# Patient Record
Sex: MALE | Race: WHITE | NOT HISPANIC OR LATINO | Employment: FULL TIME | URBAN - METROPOLITAN AREA
[De-identification: names, ages, dates, MRNs, and addresses within clinical notes are randomized per-mention and may not be internally consistent; named-entity substitution may affect disease eponyms.]

---

## 2019-07-15 RX ORDER — ATORVASTATIN CALCIUM 20 MG/1
TABLET, FILM COATED ORAL EVERY 24 HOURS
COMMUNITY
Start: 2018-02-15 | End: 2019-07-15 | Stop reason: SDUPTHER

## 2019-07-15 RX ORDER — ATORVASTATIN CALCIUM 20 MG/1
20 TABLET, FILM COATED ORAL DAILY
Qty: 90 TABLET | Refills: 1 | Status: SHIPPED | OUTPATIENT
Start: 2019-07-15 | End: 2020-03-11

## 2019-07-26 PROBLEM — I21.9 MYOCARDIAL INFARCTION (HCC): Status: ACTIVE | Noted: 2017-09-28

## 2019-07-26 PROBLEM — R00.2 PALPITATIONS: Status: ACTIVE | Noted: 2017-09-27

## 2019-07-26 PROBLEM — I25.10 CORONARY HEART DISEASE: Status: ACTIVE | Noted: 2017-09-28

## 2019-07-26 PROBLEM — E78.5 HYPERLIPIDEMIA: Status: ACTIVE | Noted: 2017-09-28

## 2019-07-26 PROBLEM — I10 HYPERTENSION: Status: ACTIVE | Noted: 2017-09-28

## 2019-07-26 RX ORDER — CLOPIDOGREL BISULFATE 75 MG/1
TABLET ORAL EVERY 24 HOURS
COMMUNITY
Start: 2018-02-15 | End: 2019-09-13 | Stop reason: SDUPTHER

## 2019-07-26 RX ORDER — AMLODIPINE BESYLATE 5 MG/1
TABLET ORAL EVERY 24 HOURS
COMMUNITY
Start: 2017-09-28 | End: 2022-07-12

## 2019-07-26 RX ORDER — LISINOPRIL 20 MG/1
TABLET ORAL EVERY 24 HOURS
COMMUNITY
Start: 2017-09-28 | End: 2021-04-27 | Stop reason: ALTCHOICE

## 2019-07-26 RX ORDER — ASPIRIN 325 MG
TABLET ORAL EVERY 24 HOURS
COMMUNITY
Start: 2017-09-28 | End: 2022-07-12

## 2019-07-30 ENCOUNTER — OFFICE VISIT (OUTPATIENT)
Dept: CARDIOLOGY | Facility: CLINIC | Age: 34
End: 2019-07-30

## 2019-07-30 VITALS
BODY MASS INDEX: 39.96 KG/M2 | HEIGHT: 72 IN | WEIGHT: 295 LBS | SYSTOLIC BLOOD PRESSURE: 119 MMHG | HEART RATE: 56 BPM | DIASTOLIC BLOOD PRESSURE: 81 MMHG

## 2019-07-30 DIAGNOSIS — I25.118 CORONARY ARTERY DISEASE OF NATIVE ARTERY OF NATIVE HEART WITH STABLE ANGINA PECTORIS (HCC): Primary | ICD-10-CM

## 2019-07-30 DIAGNOSIS — I10 ESSENTIAL HYPERTENSION: ICD-10-CM

## 2019-07-30 DIAGNOSIS — E78.00 PURE HYPERCHOLESTEROLEMIA: ICD-10-CM

## 2019-07-30 PROCEDURE — 99213 OFFICE O/P EST LOW 20 MIN: CPT | Performed by: INTERNAL MEDICINE

## 2019-07-30 RX ORDER — TRAMADOL HYDROCHLORIDE 50 MG/1
2 TABLET ORAL 2 TIMES DAILY
Refills: 0 | COMMUNITY
Start: 2019-07-13

## 2019-07-30 RX ORDER — LORAZEPAM 0.5 MG/1
0.5 TABLET ORAL 2 TIMES DAILY
Refills: 0 | COMMUNITY
Start: 2019-07-26

## 2019-07-30 RX ORDER — ATENOLOL 50 MG/1
50 TABLET ORAL DAILY
Refills: 0 | COMMUNITY
Start: 2019-06-16 | End: 2022-12-06 | Stop reason: ALTCHOICE

## 2019-07-30 RX ORDER — FAMOTIDINE 40 MG/1
40 TABLET, FILM COATED ORAL DAILY
Refills: 0 | COMMUNITY
Start: 2019-07-16 | End: 2021-04-27

## 2019-07-30 RX ORDER — GABAPENTIN 300 MG/1
300 CAPSULE ORAL 3 TIMES DAILY
Refills: 0 | COMMUNITY
Start: 2019-05-21

## 2019-07-30 RX ORDER — LOSARTAN POTASSIUM 100 MG/1
100 TABLET ORAL DAILY
Refills: 0 | COMMUNITY
Start: 2019-07-24

## 2019-07-30 NOTE — PROGRESS NOTES
"    Subjective:     Encounter Date:07/30/2019      Patient ID: Clark Randolph is a 33 y.o. male.    Chief Complaint:  History of Present Illness 33-year-old white male with history of coronary status post MI and stent placement history of hypertension hyperlipidemia and sleep apnea presents to my office for a follow-up.  Symptoms of chest pain or shortness of breath at rest on exertion.  No complaints of any PND or orthopnea.  No palpitations dizziness syncope or swelling of the feet.  He still continues to smoke.    The following portions of the patient's history were reviewed and updated as appropriate: allergies, current medications, past family history, past medical history, past social history, past surgical history and problem list.  History reviewed. No pertinent past medical history.  History reviewed. No pertinent surgical history.  /81 (BP Location: Left arm, Patient Position: Sitting, Cuff Size: Large Adult)   Pulse 56   Ht 182.9 cm (72\")   Wt 134 kg (295 lb)   BMI 40.01 kg/m²   History reviewed. No pertinent family history.    Current Outpatient Medications:   •  aspirin 325 MG tablet, Daily., Disp: , Rfl:   •  atenolol (TENORMIN) 50 MG tablet, Take 50 mg by mouth Daily., Disp: , Rfl: 0  •  atorvastatin (LIPITOR) 20 MG tablet, Take 1 tablet by mouth Daily., Disp: 90 tablet, Rfl: 1  •  clopidogrel (PLAVIX) 75 MG tablet, Daily., Disp: , Rfl:   •  famotidine (PEPCID) 40 MG tablet, Take 40 mg by mouth Daily., Disp: , Rfl: 0  •  gabapentin (NEURONTIN) 300 MG capsule, Take 300 mg by mouth 3 (Three) Times a Day., Disp: , Rfl: 0  •  LORazepam (ATIVAN) 0.5 MG tablet, Take 0.5 mg by mouth 2 (Two) Times a Day., Disp: , Rfl: 0  •  losartan (COZAAR) 100 MG tablet, Take 100 mg by mouth Daily., Disp: , Rfl: 0  •  traMADol (ULTRAM) 50 MG tablet, Take 2 tablets by mouth 2 (Two) Times a Day., Disp: , Rfl: 0  •  amLODIPine (NORVASC) 5 MG tablet, Daily., Disp: , Rfl:   •  lisinopril (PRINIVIL,ZESTRIL) 20 MG " tablet, Daily., Disp: , Rfl:   No Known Allergies  Social History     Socioeconomic History   • Marital status:      Spouse name: Not on file   • Number of children: Not on file   • Years of education: Not on file   • Highest education level: Not on file   Tobacco Use   • Smoking status: Current Every Day Smoker     Packs/day: 1.00     Types: Cigarettes   • Smokeless tobacco: Never Used   Substance and Sexual Activity   • Alcohol use: No     Frequency: Never   • Drug use: No   • Sexual activity: Defer     Review of Systems   Constitution: Negative for fever and malaise/fatigue.   HENT: Negative for congestion and hearing loss.    Eyes: Negative for double vision and visual disturbance.   Cardiovascular: Negative for chest pain, claudication, dyspnea on exertion, leg swelling and syncope.   Respiratory: Negative for cough and shortness of breath.    Endocrine: Negative for cold intolerance.   Skin: Negative for color change and rash.   Musculoskeletal: Negative for arthritis and joint pain.   Gastrointestinal: Negative for abdominal pain and heartburn.   Genitourinary: Negative for hematuria.   Neurological: Negative for excessive daytime sleepiness and dizziness.   Psychiatric/Behavioral: Negative for depression. The patient is not nervous/anxious.    All other systems reviewed and are negative.             Objective:     Physical Exam   Constitutional: He appears well-developed and well-nourished.   HENT:   Head: Normocephalic and atraumatic.   Eyes: Conjunctivae are normal. No scleral icterus.   Neck: Normal range of motion. Neck supple. No JVD present. Carotid bruit is not present.   Cardiovascular: Normal rate, regular rhythm, S1 normal, S2 normal, normal heart sounds and intact distal pulses. PMI is not displaced.   Pulmonary/Chest: Effort normal and breath sounds normal. He has no wheezes. He has no rales.   Abdominal: Soft. Bowel sounds are normal.   Neurological: He is alert. He has normal strength.    Skin: Skin is warm and dry. No rash noted.     Procedures    Lab Review:       Assessment:          Diagnosis Plan   1. Coronary artery disease of native artery of native heart with stable angina pectoris (CMS/HCC)     2. Essential hypertension     3. Pure hypercholesterolemia            Plan:       Patient had history of MI and stent placement to the LAD in the past  Patient is currently stable on aspirin Plavix beta-blockers and losartan.  Patient's lipid levels are followed by the primary care doctor.  Blood pressure and heart rate are stable per  Patient still continues to smoke and have advised him to stop smoking.  Continue current medical therapy and follow-up in 6 months.

## 2019-09-13 RX ORDER — CLOPIDOGREL BISULFATE 75 MG/1
TABLET ORAL
Qty: 30 TABLET | Refills: 5 | Status: SHIPPED | OUTPATIENT
Start: 2019-09-13 | End: 2020-05-08

## 2020-03-11 RX ORDER — ATORVASTATIN CALCIUM 20 MG/1
TABLET, FILM COATED ORAL
Qty: 90 TABLET | Refills: 0 | Status: SHIPPED | OUTPATIENT
Start: 2020-03-11 | End: 2020-06-09

## 2020-05-08 RX ORDER — CLOPIDOGREL BISULFATE 75 MG/1
TABLET ORAL
Qty: 30 TABLET | Refills: 0 | Status: SHIPPED | OUTPATIENT
Start: 2020-05-08 | End: 2020-06-09

## 2020-06-09 RX ORDER — CLOPIDOGREL BISULFATE 75 MG/1
TABLET ORAL
Qty: 30 TABLET | Refills: 0 | Status: SHIPPED | OUTPATIENT
Start: 2020-06-09 | End: 2020-07-09

## 2020-06-09 RX ORDER — ATORVASTATIN CALCIUM 20 MG/1
TABLET, FILM COATED ORAL
Qty: 30 TABLET | Refills: 0 | Status: SHIPPED | OUTPATIENT
Start: 2020-06-09 | End: 2020-07-09

## 2020-07-09 RX ORDER — CLOPIDOGREL BISULFATE 75 MG/1
TABLET ORAL
Qty: 30 TABLET | Refills: 0 | Status: SHIPPED | OUTPATIENT
Start: 2020-07-09 | End: 2020-08-10

## 2020-07-09 RX ORDER — ATORVASTATIN CALCIUM 20 MG/1
TABLET, FILM COATED ORAL
Qty: 30 TABLET | Refills: 0 | Status: SHIPPED | OUTPATIENT
Start: 2020-07-09 | End: 2020-08-10

## 2020-08-10 RX ORDER — ATORVASTATIN CALCIUM 20 MG/1
TABLET, FILM COATED ORAL
Qty: 30 TABLET | Refills: 0 | Status: SHIPPED | OUTPATIENT
Start: 2020-08-10 | End: 2020-08-10

## 2020-08-10 RX ORDER — CLOPIDOGREL BISULFATE 75 MG/1
TABLET ORAL
Qty: 30 TABLET | Refills: 0 | Status: SHIPPED | OUTPATIENT
Start: 2020-08-10 | End: 2020-08-10

## 2020-08-10 RX ORDER — ATORVASTATIN CALCIUM 20 MG/1
TABLET, FILM COATED ORAL
Qty: 30 TABLET | Refills: 0 | Status: SHIPPED | OUTPATIENT
Start: 2020-08-10 | End: 2020-10-12

## 2020-08-10 RX ORDER — CLOPIDOGREL BISULFATE 75 MG/1
TABLET ORAL
Qty: 30 TABLET | Refills: 0 | Status: SHIPPED | OUTPATIENT
Start: 2020-08-10 | End: 2020-10-12

## 2020-10-11 ENCOUNTER — TELEPHONE (OUTPATIENT)
Dept: CARDIOLOGY | Facility: CLINIC | Age: 35
End: 2020-10-11

## 2020-10-11 DIAGNOSIS — E78.5 HYPERLIPIDEMIA LDL GOAL <100: Primary | ICD-10-CM

## 2020-10-12 RX ORDER — CLOPIDOGREL BISULFATE 75 MG/1
TABLET ORAL
Qty: 30 TABLET | Refills: 0 | Status: SHIPPED | OUTPATIENT
Start: 2020-10-12 | End: 2020-11-16

## 2020-10-12 RX ORDER — ATORVASTATIN CALCIUM 20 MG/1
TABLET, FILM COATED ORAL
Qty: 30 TABLET | Refills: 0 | Status: SHIPPED | OUTPATIENT
Start: 2020-10-12 | End: 2020-11-16

## 2020-11-16 RX ORDER — ATORVASTATIN CALCIUM 20 MG/1
TABLET, FILM COATED ORAL
Qty: 30 TABLET | Refills: 0 | Status: SHIPPED | OUTPATIENT
Start: 2020-11-16 | End: 2020-12-15

## 2020-11-16 RX ORDER — CLOPIDOGREL BISULFATE 75 MG/1
TABLET ORAL
Qty: 30 TABLET | Refills: 0 | Status: SHIPPED | OUTPATIENT
Start: 2020-11-16 | End: 2020-12-15

## 2020-11-17 ENCOUNTER — TELEPHONE (OUTPATIENT)
Dept: CARDIOLOGY | Facility: CLINIC | Age: 35
End: 2020-11-17

## 2020-12-15 RX ORDER — ATORVASTATIN CALCIUM 20 MG/1
TABLET, FILM COATED ORAL
Qty: 30 TABLET | Refills: 0 | Status: SHIPPED | OUTPATIENT
Start: 2020-12-15 | End: 2021-01-19

## 2020-12-15 RX ORDER — CLOPIDOGREL BISULFATE 75 MG/1
TABLET ORAL
Qty: 30 TABLET | Refills: 0 | Status: SHIPPED | OUTPATIENT
Start: 2020-12-15 | End: 2021-01-19

## 2021-01-19 RX ORDER — ATORVASTATIN CALCIUM 20 MG/1
TABLET, FILM COATED ORAL
Qty: 30 TABLET | Refills: 0 | Status: SHIPPED | OUTPATIENT
Start: 2021-01-19 | End: 2021-02-22

## 2021-01-19 RX ORDER — CLOPIDOGREL BISULFATE 75 MG/1
TABLET ORAL
Qty: 30 TABLET | Refills: 0 | Status: SHIPPED | OUTPATIENT
Start: 2021-01-19 | End: 2021-02-22

## 2021-02-22 RX ORDER — ATORVASTATIN CALCIUM 20 MG/1
TABLET, FILM COATED ORAL
Qty: 30 TABLET | Refills: 0 | Status: SHIPPED | OUTPATIENT
Start: 2021-02-22 | End: 2021-03-03 | Stop reason: SDUPTHER

## 2021-02-22 RX ORDER — CLOPIDOGREL BISULFATE 75 MG/1
TABLET ORAL
Qty: 30 TABLET | Refills: 0 | Status: SHIPPED | OUTPATIENT
Start: 2021-02-22 | End: 2021-03-29 | Stop reason: SDUPTHER

## 2021-03-03 RX ORDER — ATORVASTATIN CALCIUM 20 MG/1
20 TABLET, FILM COATED ORAL DAILY
Qty: 30 TABLET | Refills: 5 | Status: SHIPPED | OUTPATIENT
Start: 2021-03-03 | End: 2021-10-11

## 2021-03-29 RX ORDER — CLOPIDOGREL BISULFATE 75 MG/1
TABLET ORAL
Qty: 30 TABLET | Refills: 0 | OUTPATIENT
Start: 2021-03-29

## 2021-03-29 RX ORDER — CLOPIDOGREL BISULFATE 75 MG/1
75 TABLET ORAL DAILY
Qty: 30 TABLET | Refills: 0 | Status: SHIPPED | OUTPATIENT
Start: 2021-03-29 | End: 2021-05-03

## 2021-04-16 RX ORDER — CLOPIDOGREL BISULFATE 75 MG/1
TABLET ORAL
Qty: 30 TABLET | Refills: 0 | OUTPATIENT
Start: 2021-04-16

## 2021-04-27 ENCOUNTER — OFFICE VISIT (OUTPATIENT)
Dept: CARDIOLOGY | Facility: CLINIC | Age: 36
End: 2021-04-27

## 2021-04-27 VITALS
WEIGHT: 290 LBS | BODY MASS INDEX: 39.28 KG/M2 | DIASTOLIC BLOOD PRESSURE: 81 MMHG | HEART RATE: 82 BPM | HEIGHT: 72 IN | SYSTOLIC BLOOD PRESSURE: 119 MMHG | OXYGEN SATURATION: 98 %

## 2021-04-27 DIAGNOSIS — I25.118 CORONARY ARTERY DISEASE OF NATIVE ARTERY OF NATIVE HEART WITH STABLE ANGINA PECTORIS (HCC): ICD-10-CM

## 2021-04-27 DIAGNOSIS — E78.5 HYPERLIPIDEMIA LDL GOAL <100: Primary | ICD-10-CM

## 2021-04-27 DIAGNOSIS — I10 ESSENTIAL HYPERTENSION: ICD-10-CM

## 2021-04-27 DIAGNOSIS — G47.33 OBSTRUCTIVE SLEEP APNEA: ICD-10-CM

## 2021-04-27 PROCEDURE — 99214 OFFICE O/P EST MOD 30 MIN: CPT | Performed by: INTERNAL MEDICINE

## 2021-04-27 NOTE — PROGRESS NOTES
"    Subjective:     Encounter Date:04/27/2021      Patient ID: Clark Randolph is a 35 y.o. male.    Chief Complaint:  History of Present Illness 35-year-old white male with history of coronary disease history of hypertension hyperlipidemia and sleep apnea presents to my office for follow-up.  Patient is currently stable without any symptoms of chest pain or shortness of breath at rest on exertion.  No complaints any PND orthopnea.  No palpitation dizziness syncope or swelling of the feet.  Patient has been taking all the medicines regularly.  Patient continues to smoke.  Patient is trying to exercise regularly follows a good diet.    The following portions of the patient's history were reviewed and updated as appropriate: allergies, current medications, past family history, past medical history, past social history, past surgical history and problem list.  Past Medical History:   Diagnosis Date   • Coronary artery disease    • Hyperlipidemia    • Hypertension      Past Surgical History:   Procedure Laterality Date   • NO PAST SURGERIES       /81 (BP Location: Left arm, Patient Position: Sitting, Cuff Size: Adult)   Pulse 82   Ht 182.9 cm (72\")   Wt 132 kg (290 lb)   SpO2 98%   BMI 39.33 kg/m²   Family History   Problem Relation Age of Onset   • Heart disease Mother    • Heart disease Father    • Heart failure Father    • No Known Problems Sister    • Heart disease Brother    • Heart failure Brother    • No Known Problems Maternal Aunt    • No Known Problems Maternal Uncle    • No Known Problems Paternal Aunt    • No Known Problems Paternal Uncle    • No Known Problems Maternal Grandmother    • Heart disease Maternal Grandfather    • Heart failure Maternal Grandfather    • No Known Problems Paternal Grandmother    • No Known Problems Paternal Grandfather    • No Known Problems Other    • Anemia Neg Hx    • Arrhythmia Neg Hx    • Asthma Neg Hx    • Clotting disorder Neg Hx    • Fainting Neg Hx    • Heart " attack Neg Hx    • Hyperlipidemia Neg Hx    • Hypertension Neg Hx        Current Outpatient Medications:   •  amLODIPine (NORVASC) 5 MG tablet, Daily., Disp: , Rfl:   •  aspirin 325 MG tablet, Daily., Disp: , Rfl:   •  atenolol (TENORMIN) 50 MG tablet, Take 50 mg by mouth Daily., Disp: , Rfl: 0  •  atorvastatin (LIPITOR) 20 MG tablet, Take 1 tablet by mouth Daily., Disp: 30 tablet, Rfl: 5  •  clopidogrel (PLAVIX) 75 MG tablet, Take 1 tablet by mouth Daily., Disp: 30 tablet, Rfl: 0  •  gabapentin (NEURONTIN) 300 MG capsule, Take 300 mg by mouth 3 (Three) Times a Day., Disp: , Rfl: 0  •  LORazepam (ATIVAN) 0.5 MG tablet, Take 0.5 mg by mouth 2 (Two) Times a Day., Disp: , Rfl: 0  •  losartan (COZAAR) 100 MG tablet, Take 100 mg by mouth Daily., Disp: , Rfl: 0  •  traMADol (ULTRAM) 50 MG tablet, Take 2 tablets by mouth 2 (Two) Times a Day., Disp: , Rfl: 0  No Known Allergies  Social History     Socioeconomic History   • Marital status:      Spouse name: Not on file   • Number of children: Not on file   • Years of education: Not on file   • Highest education level: Not on file   Tobacco Use   • Smoking status: Current Every Day Smoker     Packs/day: 1.00     Types: Cigarettes   • Smokeless tobacco: Never Used   Vaping Use   • Vaping Use: Never used   Substance and Sexual Activity   • Alcohol use: No   • Drug use: No   • Sexual activity: Defer     Review of Systems   Constitutional: Negative for fever and malaise/fatigue.   Cardiovascular: Negative for chest pain, dyspnea on exertion and palpitations.   Respiratory: Negative for cough and shortness of breath.    Skin: Negative for rash.   Gastrointestinal: Negative for abdominal pain, nausea and vomiting.   Neurological: Negative for focal weakness and headaches.   All other systems reviewed and are negative.             Objective:     Constitutional:       Appearance: Well-developed.   Eyes:      General: No scleral icterus.     Conjunctiva/sclera: Conjunctivae  normal.   HENT:      Head: Normocephalic and atraumatic.   Neck:      Vascular: No carotid bruit or JVD.   Pulmonary:      Effort: Pulmonary effort is normal.      Breath sounds: Normal breath sounds. No wheezing. No rales.   Cardiovascular:      Normal rate. Regular rhythm.   Pulses:     Intact distal pulses.   Abdominal:      General: Bowel sounds are normal.      Palpations: Abdomen is soft.   Musculoskeletal:      Cervical back: Normal range of motion and neck supple. Skin:     General: Skin is warm and dry.      Findings: No rash.   Neurological:      Mental Status: Alert.       Procedures    Lab Review:         MDM  1.  Coronary disease  Patient has nonobstructive coronary disease now with normal LV systolic function is currently stable on medications  2.  Hypertension  Patient blood pressure currently stable on medications  3.  Hyperlipidemia  Patient is on Lipitor 20 mg and his lipid levels are followed by the primary care doctor  4.  Obstructive sleep apnea  Patient complains of fatigue and probably has sleep apnea needs to be tested.  He does not use a CPAP machine at this time.

## 2021-05-03 RX ORDER — CLOPIDOGREL BISULFATE 75 MG/1
TABLET ORAL
Qty: 90 TABLET | Refills: 3 | Status: SHIPPED | OUTPATIENT
Start: 2021-05-03 | End: 2022-06-13 | Stop reason: SDUPTHER

## 2021-10-11 RX ORDER — ATORVASTATIN CALCIUM 20 MG/1
TABLET, FILM COATED ORAL
Qty: 30 TABLET | Refills: 0 | Status: SHIPPED | OUTPATIENT
Start: 2021-10-11 | End: 2021-11-11

## 2021-10-11 NOTE — TELEPHONE ENCOUNTER
Rx Refill Note  Requested Prescriptions     Pending Prescriptions Disp Refills   • atorvastatin (LIPITOR) 20 MG tablet [Pharmacy Med Name: Atorvastatin Calcium 20 MG Oral Tablet] 30 tablet 0     Sig: Take 1 tablet by mouth once daily      Last office visit with prescribing clinician: 4/27/2021      Next office visit with prescribing clinician: Visit date not found            Pita Segura MA  10/11/21, 07:56 EDT

## 2021-11-11 RX ORDER — ATORVASTATIN CALCIUM 20 MG/1
TABLET, FILM COATED ORAL
Qty: 30 TABLET | Refills: 0 | Status: SHIPPED | OUTPATIENT
Start: 2021-11-11 | End: 2021-12-16

## 2021-11-11 NOTE — TELEPHONE ENCOUNTER
Rx Refill Note  Requested Prescriptions     Pending Prescriptions Disp Refills   • atorvastatin (LIPITOR) 20 MG tablet [Pharmacy Med Name: Atorvastatin Calcium 20 MG Oral Tablet] 30 tablet 0     Sig: Take 1 tablet by mouth once daily      Last office visit with prescribing clinician: 4/27/2021      Next office visit with prescribing clinician: jessee/u in [place]       {TIP  Please add Last Relevant Lab Date if appropriate:23}     Lauren Dunaway MA  11/11/21, 09:23 EST

## 2021-12-16 RX ORDER — ATORVASTATIN CALCIUM 20 MG/1
TABLET, FILM COATED ORAL
Qty: 30 TABLET | Refills: 3 | Status: SHIPPED | OUTPATIENT
Start: 2021-12-16 | End: 2022-04-18

## 2021-12-16 NOTE — TELEPHONE ENCOUNTER
Rx Refill Note  Requested Prescriptions     Pending Prescriptions Disp Refills   • atorvastatin (LIPITOR) 20 MG tablet [Pharmacy Med Name: Atorvastatin Calcium 20 MG Oral Tablet] 30 tablet 0     Sig: Take 1 tablet by mouth once daily      Last office visit with prescribing clinician: 4/27/2021      Next office visit with prescribing clinician: Visit date not found            Chelsey Rangel MA  12/16/21, 09:52 EST

## 2022-04-18 RX ORDER — ATORVASTATIN CALCIUM 20 MG/1
TABLET, FILM COATED ORAL
Qty: 30 TABLET | Refills: 0 | Status: SHIPPED | OUTPATIENT
Start: 2022-04-18 | End: 2022-05-24

## 2022-05-24 RX ORDER — ATORVASTATIN CALCIUM 20 MG/1
TABLET, FILM COATED ORAL
Qty: 30 TABLET | Refills: 0 | Status: SHIPPED | OUTPATIENT
Start: 2022-05-24 | End: 2022-06-24

## 2022-05-24 NOTE — TELEPHONE ENCOUNTER
Rx Refill Note  Requested Prescriptions     Pending Prescriptions Disp Refills   • atorvastatin (LIPITOR) 20 MG tablet [Pharmacy Med Name: Atorvastatin Calcium 20 MG Oral Tablet] 30 tablet 0     Sig: Take 1 tablet by mouth once daily      Last office visit with prescribing clinician: 4/27/2021      Next office visit with prescribing clinician: 5/24/2022            Pita Segura MA  05/24/22, 08:06 EDT

## 2022-06-07 RX ORDER — CLOPIDOGREL BISULFATE 75 MG/1
TABLET ORAL
Qty: 90 TABLET | Refills: 0 | OUTPATIENT
Start: 2022-06-07

## 2022-06-07 NOTE — TELEPHONE ENCOUNTER
Rx Refill Note  Requested Prescriptions     Pending Prescriptions Disp Refills   • clopidogrel (PLAVIX) 75 MG tablet [Pharmacy Med Name: Clopidogrel Bisulfate 75 MG Oral Tablet] 90 tablet 0     Sig: Take 1 tablet by mouth once daily      Last office visit with prescribing clinician: 4/27/2021      Next office visit with prescribing clinician: Visit date not found            Chelsey Rangel MA  06/07/22, 09:22 EDT

## 2022-06-09 RX ORDER — CLOPIDOGREL BISULFATE 75 MG/1
TABLET ORAL
Qty: 90 TABLET | Refills: 0 | OUTPATIENT
Start: 2022-06-09

## 2022-06-09 NOTE — TELEPHONE ENCOUNTER
Rx Refill Note  Requested Prescriptions     Refused Prescriptions Disp Refills   • clopidogrel (PLAVIX) 75 MG tablet [Pharmacy Med Name: Clopidogrel Bisulfate 75 MG Oral Tablet] 90 tablet 0     Sig: Take 1 tablet by mouth once daily      Last office visit with prescribing clinician: 4/27/2021      Next office visit with prescribing clinician: Visit date not found            Pita Segura MA  06/09/22, 14:59 EDT

## 2022-06-13 ENCOUNTER — TELEPHONE (OUTPATIENT)
Dept: CARDIOLOGY | Facility: CLINIC | Age: 37
End: 2022-06-13

## 2022-06-13 RX ORDER — CLOPIDOGREL BISULFATE 75 MG/1
75 TABLET ORAL DAILY
Qty: 90 TABLET | OUTPATIENT
Start: 2022-06-13

## 2022-06-13 RX ORDER — CLOPIDOGREL BISULFATE 75 MG/1
75 TABLET ORAL DAILY
Qty: 30 TABLET | Refills: 0 | Status: SHIPPED | OUTPATIENT
Start: 2022-06-13 | End: 2022-07-05

## 2022-06-13 NOTE — TELEPHONE ENCOUNTER
WIFE CALLED FOR REFILL ON PLAVIX.  SHE SAID PATIENT HAS APT IN [place] THIS MONTH WITH DR. PATRICK, BUT COULD NOT TELL ME THE DATE. CAN WE SEND IN REFILL TO [facility] IN [place].

## 2022-06-13 NOTE — TELEPHONE ENCOUNTER
Rx Refill Note  Requested Prescriptions     Pending Prescriptions Disp Refills   • clopidogrel (PLAVIX) 75 MG tablet 30 tablet 0     Sig: Take 1 tablet by mouth Daily.      Last office visit with prescribing clinician: 4/27/2021      Next office visit with prescribing clinician: Visit date not found            Pita Segura MA  06/13/22, 10:28 EDT

## 2022-06-13 NOTE — TELEPHONE ENCOUNTER
Rx Refill Note  Requested Prescriptions     Pending Prescriptions Disp Refills   • clopidogrel (PLAVIX) 75 MG tablet [Pharmacy Med Name: CLOPIDOGREL 75MG TABLETS] 90 tablet      Sig: TAKE 1 TABLET BY MOUTH DAILY      Last office visit with prescribing clinician: 4/27/2021      Next office visit with prescribing clinician: Visit date not found            Chelsey Rangel MA  06/13/22, 10:39 EDT

## 2022-06-24 RX ORDER — ATORVASTATIN CALCIUM 20 MG/1
TABLET, FILM COATED ORAL
Qty: 30 TABLET | Refills: 0 | Status: SHIPPED | OUTPATIENT
Start: 2022-06-24 | End: 2022-07-26

## 2022-06-24 NOTE — TELEPHONE ENCOUNTER
Rx Refill Note  Requested Prescriptions     Pending Prescriptions Disp Refills   • atorvastatin (LIPITOR) 20 MG tablet [Pharmacy Med Name: Atorvastatin Calcium 20 MG Oral Tablet] 30 tablet 0     Sig: Take 1 tablet by mouth once daily      Last office visit with prescribing clinician: 4/27/2021      Next office visit with prescribing clinician: Visit date not found            Chelsey Rangel MA  06/24/22, 13:52 EDT

## 2022-06-29 ENCOUNTER — TELEPHONE (OUTPATIENT)
Dept: CARDIOLOGY | Facility: CLINIC | Age: 37
End: 2022-06-29

## 2022-06-29 NOTE — TELEPHONE ENCOUNTER
Dr. Chan Garcia  Tooth extraction  Scheduled 7/1/22  Phone# [phone]  Fax# [phone]          Placed on Dr. Jose HILL desk

## 2022-07-05 RX ORDER — CLOPIDOGREL BISULFATE 75 MG/1
75 TABLET ORAL DAILY
Qty: 90 TABLET | OUTPATIENT
Start: 2022-07-05

## 2022-07-05 RX ORDER — CLOPIDOGREL BISULFATE 75 MG/1
75 TABLET ORAL DAILY
Qty: 30 TABLET | Refills: 0 | Status: SHIPPED | OUTPATIENT
Start: 2022-07-05 | End: 2022-08-11

## 2022-07-05 NOTE — TELEPHONE ENCOUNTER
Rx Refill Note  Requested Prescriptions     Pending Prescriptions Disp Refills   • clopidogrel (PLAVIX) 75 MG tablet [Pharmacy Med Name: CLOPIDOGREL 75MG TABLETS] 30 tablet 0     Sig: TAKE 1 TABLET BY MOUTH DAILY      Last office visit with prescribing clinician: 4/27/2021      Next office visit with prescribing clinician: Visit date not found            Chelsey Rangel MA  07/05/22, 08:50 EDT

## 2022-07-05 NOTE — TELEPHONE ENCOUNTER
Rx Refill Note  Requested Prescriptions     Refused Prescriptions Disp Refills   • clopidogrel (PLAVIX) 75 MG tablet [Pharmacy Med Name: CLOPIDOGREL 75MG TABLETS] 90 tablet      Sig: TAKE 1 TABLET BY MOUTH DAILY      Last office visit with prescribing clinician: 4/27/2021      Next office visit with prescribing clinician: Visit date not found            Chelsey Rangel MA  07/05/22, 09:23 EDT

## 2022-07-12 ENCOUNTER — OFFICE VISIT (OUTPATIENT)
Dept: CARDIOLOGY | Facility: CLINIC | Age: 37
End: 2022-07-12

## 2022-07-12 VITALS
WEIGHT: 288 LBS | HEART RATE: 61 BPM | BODY MASS INDEX: 39.01 KG/M2 | DIASTOLIC BLOOD PRESSURE: 80 MMHG | OXYGEN SATURATION: 97 % | HEIGHT: 72 IN | SYSTOLIC BLOOD PRESSURE: 117 MMHG

## 2022-07-12 DIAGNOSIS — I10 ESSENTIAL HYPERTENSION: ICD-10-CM

## 2022-07-12 DIAGNOSIS — I25.118 CORONARY ARTERY DISEASE OF NATIVE ARTERY OF NATIVE HEART WITH STABLE ANGINA PECTORIS: ICD-10-CM

## 2022-07-12 DIAGNOSIS — G47.33 OBSTRUCTIVE SLEEP APNEA: ICD-10-CM

## 2022-07-12 DIAGNOSIS — E78.5 HYPERLIPIDEMIA LDL GOAL <100: Primary | ICD-10-CM

## 2022-07-12 PROCEDURE — 99213 OFFICE O/P EST LOW 20 MIN: CPT | Performed by: INTERNAL MEDICINE

## 2022-07-12 RX ORDER — ASPIRIN 81 MG/1
81 TABLET ORAL DAILY
COMMUNITY
End: 2022-12-06

## 2022-07-12 NOTE — PROGRESS NOTES
"    Subjective:     Encounter Date:07/12/2022      Patient ID: Clark Randolph is a 36 y.o. male.    Chief Complaint:  History of Present Illness 36-year-old white male with history of coronary status post and placement of LAD history of hypertension hyperlipidemia presents to my office for follow-up.  Patient is currently stable without any symptoms of chest pain but has some shortness of breath with exertion.  No complains any PND orthopnea.  No palpitations dizziness syncope he has some mild swelling of the feet but is taking specifically but he still continues to smoke.    The following portions of the patient's history were reviewed and updated as appropriate: allergies, current medications, past family history, past medical history, past social history, past surgical history and problem list.  Past Medical History:   Diagnosis Date   • Coronary artery disease    • Hyperlipidemia    • Hypertension    • Myocardial infarction (HCC) 09/15/2017     Past Surgical History:   Procedure Laterality Date   • CORONARY STENT PLACEMENT  09/2017   • NO PAST SURGERIES       /80 (BP Location: Left arm, Patient Position: Sitting, Cuff Size: Adult)   Pulse 61   Ht 182.9 cm (72\")   Wt 131 kg (288 lb)   SpO2 97%   BMI 39.06 kg/m²   Family History   Problem Relation Age of Onset   • Heart disease Mother    • Heart disease Father    • Heart failure Father    • No Known Problems Sister    • Heart disease Brother    • Heart failure Brother    • Heart attack Brother    • No Known Problems Maternal Aunt    • No Known Problems Maternal Uncle    • No Known Problems Paternal Aunt    • No Known Problems Paternal Uncle    • No Known Problems Maternal Grandmother    • Heart disease Maternal Grandfather    • Heart failure Maternal Grandfather    • No Known Problems Paternal Grandmother    • No Known Problems Paternal Grandfather    • No Known Problems Other    • Anemia Neg Hx    • Arrhythmia Neg Hx    • Asthma Neg Hx    • Clotting " disorder Neg Hx    • Fainting Neg Hx    • Hyperlipidemia Neg Hx    • Hypertension Neg Hx        Current Outpatient Medications:   •  aspirin 81 MG EC tablet, Take 81 mg by mouth Daily., Disp: , Rfl:   •  atenolol (TENORMIN) 50 MG tablet, Take 50 mg by mouth Daily., Disp: , Rfl: 0  •  atorvastatin (LIPITOR) 20 MG tablet, Take 1 tablet by mouth once daily, Disp: 30 tablet, Rfl: 0  •  clopidogrel (PLAVIX) 75 MG tablet, TAKE 1 TABLET BY MOUTH DAILY, Disp: 30 tablet, Rfl: 0  •  gabapentin (NEURONTIN) 300 MG capsule, Take 300 mg by mouth 3 (Three) Times a Day., Disp: , Rfl: 0  •  LORazepam (ATIVAN) 0.5 MG tablet, Take 0.5 mg by mouth 2 (Two) Times a Day., Disp: , Rfl: 0  •  losartan (COZAAR) 100 MG tablet, Take 100 mg by mouth Daily., Disp: , Rfl: 0  •  traMADol (ULTRAM) 50 MG tablet, Take 2 tablets by mouth 2 (Two) Times a Day., Disp: , Rfl: 0  No Known Allergies  Social History     Socioeconomic History   • Marital status:    Tobacco Use   • Smoking status: Current Every Day Smoker     Packs/day: 0.50     Years: 15.00     Pack years: 7.50     Types: Cigarettes   • Smokeless tobacco: Never Used   Vaping Use   • Vaping Use: Never used   Substance and Sexual Activity   • Alcohol use: No   • Drug use: No   • Sexual activity: Not Currently     Partners: Female     Birth control/protection: Abstinence, Condom     Review of Systems   Constitutional: Negative for fever and malaise/fatigue.   Cardiovascular: Positive for leg swelling. Negative for chest pain, dyspnea on exertion and palpitations.   Respiratory: Positive for shortness of breath. Negative for cough.    Skin: Negative for rash.   Gastrointestinal: Negative for abdominal pain, nausea and vomiting.   Neurological: Negative for focal weakness and headaches.   All other systems reviewed and are negative.             Objective:     Constitutional:       Appearance: Well-developed.   Eyes:      General: No scleral icterus.     Conjunctiva/sclera: Conjunctivae  normal.   HENT:      Head: Normocephalic and atraumatic.   Neck:      Vascular: No carotid bruit or JVD.   Pulmonary:      Effort: Pulmonary effort is normal.      Breath sounds: Normal breath sounds. No wheezing. No rales.   Cardiovascular:      Normal rate. Regular rhythm.   Pulses:     Intact distal pulses.   Abdominal:      General: Bowel sounds are normal.      Palpations: Abdomen is soft.   Musculoskeletal:      Cervical back: Normal range of motion and neck supple. Skin:     General: Skin is warm and dry.      Findings: No rash.   Neurological:      Mental Status: Alert.       Procedures    Lab Review:         MDM #1 coronary disease  Patient had stent placement to the LAD and is currently stable on medical therapy with normal function  2.  Hypertension  .  Blood pressure currently stable on amlodipine and atenolol  3.  Hyperlipidemia  Patient is on a atorvastatin and the lipid levels are well within normal limits      Patient's previous medical records, labs, and EKG were reviewed and discussed with the patient at today's visit.

## 2022-07-26 RX ORDER — ATORVASTATIN CALCIUM 20 MG/1
TABLET, FILM COATED ORAL
Qty: 90 TABLET | Refills: 3 | Status: SHIPPED | OUTPATIENT
Start: 2022-07-26 | End: 2022-12-06

## 2022-07-26 NOTE — TELEPHONE ENCOUNTER
Rx Refill Note  Requested Prescriptions     Pending Prescriptions Disp Refills   • atorvastatin (LIPITOR) 20 MG tablet [Pharmacy Med Name: Atorvastatin Calcium 20 MG Oral Tablet] 90 tablet 3     Sig: Take 1 tablet by mouth once daily      Last office visit with prescribing clinician: 7/12/2022      Next office visit with prescribing clinician: Visit date not found            Pita Segura MA  07/26/22, 09:57 EDT

## 2022-08-11 RX ORDER — CLOPIDOGREL BISULFATE 75 MG/1
75 TABLET ORAL DAILY
Qty: 90 TABLET | Refills: 2 | Status: SHIPPED | OUTPATIENT
Start: 2022-08-11

## 2022-08-11 NOTE — TELEPHONE ENCOUNTER
Rx Refill Note  Requested Prescriptions     Pending Prescriptions Disp Refills   • clopidogrel (PLAVIX) 75 MG tablet [Pharmacy Med Name: CLOPIDOGREL 75MG TABLETS] 90 tablet 2     Sig: TAKE 1 TABLET BY MOUTH DAILY      Last office visit with prescribing clinician: 7/12/2022      Next office visit with prescribing clinician: Visit date not found            Chelsey Rangel MA  08/11/22, 10:09 EDT

## 2022-12-06 ENCOUNTER — OFFICE VISIT (OUTPATIENT)
Dept: CARDIOLOGY | Facility: CLINIC | Age: 37
End: 2022-12-06

## 2022-12-06 VITALS
SYSTOLIC BLOOD PRESSURE: 108 MMHG | WEIGHT: 286 LBS | DIASTOLIC BLOOD PRESSURE: 71 MMHG | HEART RATE: 67 BPM | BODY MASS INDEX: 38.74 KG/M2 | HEIGHT: 72 IN | OXYGEN SATURATION: 94 %

## 2022-12-06 DIAGNOSIS — I25.10 CORONARY ARTERY DISEASE INVOLVING NATIVE CORONARY ARTERY OF NATIVE HEART WITHOUT ANGINA PECTORIS: ICD-10-CM

## 2022-12-06 DIAGNOSIS — Z82.49 FAMILY HISTORY OF PREMATURE CORONARY ARTERY DISEASE: Primary | ICD-10-CM

## 2022-12-06 DIAGNOSIS — E78.2 MIXED HYPERLIPIDEMIA: ICD-10-CM

## 2022-12-06 DIAGNOSIS — R00.2 PALPITATIONS: ICD-10-CM

## 2022-12-06 DIAGNOSIS — I10 PRIMARY HYPERTENSION: ICD-10-CM

## 2022-12-06 DIAGNOSIS — Z72.0 TOBACCO ABUSE: ICD-10-CM

## 2022-12-06 PROCEDURE — 93010 ELECTROCARDIOGRAM REPORT: CPT | Performed by: INTERNAL MEDICINE

## 2022-12-06 PROCEDURE — 99214 OFFICE O/P EST MOD 30 MIN: CPT | Performed by: INTERNAL MEDICINE

## 2022-12-06 RX ORDER — ATORVASTATIN CALCIUM 80 MG/1
80 TABLET, FILM COATED ORAL DAILY
Qty: 90 TABLET | Refills: 3 | Status: SHIPPED | OUTPATIENT
Start: 2022-12-06

## 2022-12-06 RX ORDER — METOPROLOL SUCCINATE 25 MG/1
25 TABLET, EXTENDED RELEASE ORAL DAILY
Qty: 90 TABLET | Refills: 3 | Status: SHIPPED | OUTPATIENT
Start: 2022-12-06

## 2022-12-06 RX ORDER — DOXAZOSIN MESYLATE 1 MG/1
1 TABLET ORAL DAILY
COMMUNITY
Start: 2022-12-05

## 2022-12-06 NOTE — PROGRESS NOTES
Encounter Date:12/06/2022      Patient ID: Clark Randolph is a 37 y.o. male.    Chief Complaint   Patient presents with   • Coronary Artery Disease          History of Present Illness  Needed is a 37-year-old with past medical history of premature coronary artery disease status post PCI of the LAD in the setting of anterior wall MI in 2017 at the age of 32 with a Xience 2.5 x 28 mm stent, hypertension, hyperlipidemia, tobacco abuse who presents as a follow-up.    He says he has been doing well from a cardiac perspective.  Denies any chest pain or shortness of breath.  Has had a couple of episodes of palpitations at night before going to sleep.  He associates these with anxiety.  He remains active and continues to work.  He also continues to smoke    The following portions of the patient's history were reviewed and updated as appropriate: allergies, current medications, past family history, past medical history, past social history, past surgical history, and problem list.    Review of Systems   Constitutional: Negative for malaise/fatigue.   Cardiovascular: Positive for palpitations. Negative for chest pain, dyspnea on exertion and leg swelling.   Respiratory: Negative for cough and shortness of breath.    Gastrointestinal: Negative for abdominal pain, nausea and vomiting.   Neurological: Negative for dizziness, focal weakness, headaches, light-headedness and numbness.   All other systems reviewed and are negative.        Current Outpatient Medications:   •  clopidogrel (PLAVIX) 75 MG tablet, TAKE 1 TABLET BY MOUTH DAILY, Disp: 90 tablet, Rfl: 2  •  doxazosin (CARDURA) 1 MG tablet, Take 1 tablet by mouth Daily., Disp: , Rfl:   •  gabapentin (NEURONTIN) 300 MG capsule, Take 300 mg by mouth 3 (Three) Times a Day., Disp: , Rfl: 0  •  LORazepam (ATIVAN) 0.5 MG tablet, Take 0.5 mg by mouth 2 (Two) Times a Day., Disp: , Rfl: 0  •  losartan (COZAAR) 100 MG tablet, Take 100 mg by mouth Daily., Disp: , Rfl: 0  •  traMADol  (ULTRAM) 50 MG tablet, Take 2 tablets by mouth 2 (Two) Times a Day., Disp: , Rfl: 0  •  atorvastatin (LIPITOR) 80 MG tablet, Take 1 tablet by mouth Daily., Disp: 90 tablet, Rfl: 3  •  metoprolol succinate XL (TOPROL-XL) 25 MG 24 hr tablet, Take 1 tablet by mouth Daily., Disp: 90 tablet, Rfl: 3  •  Rivaroxaban (Xarelto) 2.5 MG tablet, Take 1 tablet by mouth 2 (Two) Times a Day., Disp: 60 tablet, Rfl: 3    No Known Allergies    Family History   Problem Relation Age of Onset   • Heart disease Mother    • Heart disease Father    • Heart failure Father    • No Known Problems Sister    • Heart disease Brother    • Heart failure Brother    • Heart attack Brother    • No Known Problems Maternal Aunt    • No Known Problems Maternal Uncle    • No Known Problems Paternal Aunt    • No Known Problems Paternal Uncle    • No Known Problems Maternal Grandmother    • Heart disease Maternal Grandfather    • Heart failure Maternal Grandfather    • No Known Problems Paternal Grandmother    • No Known Problems Paternal Grandfather    • No Known Problems Other    • Anemia Neg Hx    • Arrhythmia Neg Hx    • Asthma Neg Hx    • Clotting disorder Neg Hx    • Fainting Neg Hx    • Hyperlipidemia Neg Hx    • Hypertension Neg Hx        Past Surgical History:   Procedure Laterality Date   • CORONARY STENT PLACEMENT  09/2017   • NO PAST SURGERIES         Past Medical History:   Diagnosis Date   • Coronary artery disease    • Hyperlipidemia    • Hypertension    • Myocardial infarction (HCC) 09/15/2017       Social History     Socioeconomic History   • Marital status:    Tobacco Use   • Smoking status: Every Day     Packs/day: 0.50     Years: 15.00     Pack years: 7.50     Types: Cigarettes   • Smokeless tobacco: Never   Vaping Use   • Vaping Use: Never used   Substance and Sexual Activity   • Alcohol use: No   • Drug use: No   • Sexual activity: Not Currently     Partners: Female     Birth control/protection: Condom, Abstinence           ECG  "12 Lead    Date/Time: 12/6/2022 12:08 PM  Performed by: Amanda Woodard MD  Authorized by: Amanda Woodard MD   Comparison: compared with previous ECG   Similar to previous ECG  Rhythm: sinus rhythm  BPM: 61  Q waves: V1, V2 and V3                  Objective:       Physical Exam    /71   Pulse 67   Ht 182.9 cm (72\")   Wt 130 kg (286 lb)   SpO2 94%   BMI 38.79 kg/m²   The patient is alert, oriented and in no distress.    Vital signs as noted above.    Head and neck revealed no carotid bruits or jugular venous distension.  No thyromegaly or lymphadenopathy is present.    Lungs clear.  No wheezing.  Breath sounds are normal bilaterally.    Heart normal first and second heart sounds.  No murmur..  No pericardial rub is present.  No gallop is present.    Abdomen soft and nontender.  No organomegaly is present.    Extremities revealed good peripheral pulses without any pedal edema.    Skin warm and dry.    Musculoskeletal system is grossly normal.    CNS grossly normal.           Diagnosis Plan   1. Family history of premature coronary artery disease        2. Coronary artery disease involving native coronary artery of native heart without angina pectoris        3. Mixed hyperlipidemia        4. Primary hypertension        5. Palpitations        6. Tobacco abuse        LAB RESULTS (LAST 7 DAYS)    CBC        BMP        CMP         BNP        TROPONIN        CoAg        Creatinine Clearance  CrCl cannot be calculated (No successful lab value found.).    ABG        Radiology  No radiology results for the last day         Assessment and Plan       Diagnoses and all orders for this visit:    1. Family history of premature coronary artery disease (Primary)    2. Coronary artery disease involving native coronary artery of native heart without angina pectoris    3. Mixed hyperlipidemia    4. Primary hypertension    5. Palpitations    6. Tobacco abuse    Other orders  -     metoprolol succinate XL (TOPROL-XL) 25 MG " 24 hr tablet; Take 1 tablet by mouth Daily.  Dispense: 90 tablet; Refill: 3  -     atorvastatin (LIPITOR) 80 MG tablet; Take 1 tablet by mouth Daily.  Dispense: 90 tablet; Refill: 3  -     Rivaroxaban (Xarelto) 2.5 MG tablet; Take 1 tablet by mouth 2 (Two) Times a Day.  Dispense: 60 tablet; Refill: 3  -     ECG 12 Lead         Premature coronary artery disease  Status post anterior wall STEMI and PCI with a 2.5 x 20 mm drug-eluting stent in 2017  Currently chest pain-free  On aspirin and Plavix  We will see if Xarelto 2.5 mg twice daily is available through his insurance based on [employer] trial data  If it is not covered we will continue with DAPT indefinitely    Hypertension  Currently controlled  Switch atenolol to metoprolol 25 mg XL  Continue with losartan    Hyperlipidemia  Increase atorvastatin to 80 mg  I do not have any lipid panels on him  He will get a lipid panel with his PCP and bring me the results  Goal LDL is less than 70    Palpitations  Occasional and associated with anxiety  Continue with beta-blocker    Tobacco abuse  I have provided smoking cessation counseling for the patient today. I extensively discussed with the patient the cardiovascular risks associated with smoking and other tobacco products. Patient stated understanding and their questions were answered to their satisfaction.  I explained to him that this puts him at the greatest risk for in-stent stenosis on a repeat MI.        Amanda Woodard MD

## 2023-04-13 RX ORDER — CLOPIDOGREL BISULFATE 75 MG/1
75 TABLET ORAL DAILY
Qty: 90 TABLET | Refills: 2 | Status: SHIPPED | OUTPATIENT
Start: 2023-04-13

## 2023-04-13 NOTE — TELEPHONE ENCOUNTER
Rx Refill Note  Requested Prescriptions     Pending Prescriptions Disp Refills   • clopidogrel (PLAVIX) 75 MG tablet 90 tablet 2     Sig: Take 1 tablet by mouth Daily.      Last office visit with prescribing clinician: 7/12/2022   Last telemedicine visit with prescribing clinician: Visit date not found   Next office visit with prescribing clinician: Visit date not found                         Would you like a call back once the refill request has been completed: [] Yes [] No    If the office needs to give you a call back, can they leave a voicemail: [] Yes [] No    Page Kwon MA  04/13/23, 08:41 EDT

## 2023-08-01 RX ORDER — DOXAZOSIN MESYLATE 1 MG/1
TABLET ORAL
Qty: 90 TABLET | Refills: 0 | OUTPATIENT
Start: 2023-08-01

## 2023-09-11 ENCOUNTER — TELEPHONE (OUTPATIENT)
Dept: CARDIOLOGY | Facility: CLINIC | Age: 38
End: 2023-09-11
Payer: COMMERCIAL

## 2023-09-11 NOTE — TELEPHONE ENCOUNTER
DR. ELVIE DENSON, DMD  PHONE [phone]  [phone]  EXTRACTIONS  PATIENT ON PLAVIX.    LOV 12/6/22. NEXT APT 12/5/23 IN [place].    DOES HE NEED SEEN PRIOR?

## 2023-09-12 NOTE — TELEPHONE ENCOUNTER
PATIENT IS CLEARED FOR PROCEDURE AND CAN HOLD MEDICATION FOR 5 DAYS. LETTER HAS BEEN SIGNED AND FAXED BACK TO THE [facility]CTORS OFFICE.

## 2023-11-07 ENCOUNTER — TELEPHONE (OUTPATIENT)
Dept: CARDIOLOGY | Facility: CLINIC | Age: 38
End: 2023-11-07
Payer: COMMERCIAL

## 2023-11-07 NOTE — TELEPHONE ENCOUNTER
FACILITY: [facility] [place]  : [place]  PHONE: [phone]  FAX: [phone]  PROCEDURE: FULL UPPER ARCH EXTRACTION  SCHEDULED: TBD  MEDS TO HOLD: PLAVIX    PLACED ON DR ABDUL'S DESK FOR REVIEW.

## 2024-01-01 NOTE — PROGRESS NOTES
Encounter Date:12/06/2022      Patient ID: Clark Randolph is a 38 y.o. male.    Chief Complaint   Patient presents with    Follow-up     1 year f/u with EKG          History of Present Illness  Clark is a 38-year-old with past medical history of premature coronary artery disease status post PCI of the LAD in the setting of anterior wall MI in 2017 at the age of 32 with a Xience 2.5 x 28 mm stent, hypertension, hyperlipidemia, tobacco abuse who presents as a follow-up.    Continues to do well from a cardiac standpoint.  Denies any chest pain or shortness of breath.  Occasionally gets some lower extremity edema.  No orthopnea or PND.  Of note his EF was about 35 to 40% at the time of his MI and he has not had a repeat echo since then.  He has a very strong family history of premature CAD and his brother who is 2 years older than him is currently on a transplant list.  He continues to smoke.  Denies any palpitations.  He was switched from atenolol to metoprolol at the last visit but did not tolerate metoprolol at all and it gave him extreme anxiety and for this reason he stopped it.      EKG in clinic today shows normal sinus rhythm with an old septal infarct and a rate of 60 bpm.  No acute ST-T changes.  No change compared to prior      The following portions of the patient's history were reviewed and updated as appropriate: allergies, current medications, past family history, past medical history, past social history, past surgical history, and problem list.    Review of Systems   Constitutional: Negative for malaise/fatigue.   Cardiovascular:  Positive for leg swelling and palpitations. Negative for chest pain and dyspnea on exertion.   Respiratory:  Negative for cough and shortness of breath.    Gastrointestinal:  Negative for abdominal pain, nausea and vomiting.   Neurological:  Negative for dizziness, focal weakness, headaches, light-headedness and numbness.   All other systems reviewed and are  negative.        Current Outpatient Medications:     atorvastatin (LIPITOR) 80 MG tablet, Take 1 tablet by mouth Daily., Disp: 90 tablet, Rfl: 3    clopidogrel (PLAVIX) 75 MG tablet, Take 1 tablet by mouth Daily., Disp: 90 tablet, Rfl: 2    doxazosin (CARDURA) 1 MG tablet, Take 1 tablet by mouth Daily., Disp: , Rfl:     gabapentin (NEURONTIN) 300 MG capsule, Take 1 capsule by mouth 3 (Three) Times a Day., Disp: , Rfl: 0    LORazepam (ATIVAN) 0.5 MG tablet, Take 1 tablet by mouth 2 (Two) Times a Day., Disp: , Rfl: 0    losartan (COZAAR) 100 MG tablet, Take 1 tablet by mouth Daily., Disp: , Rfl: 0    traMADol (ULTRAM) 50 MG tablet, Take 2 tablets by mouth 2 (Two) Times a Day., Disp: , Rfl: 0    carvedilol (COREG) 3.125 MG tablet, Take 1 tablet by mouth 2 (Two) Times a Day., Disp: 180 tablet, Rfl: 3    Allergies   Allergen Reactions    Metoprolol Myalgia       Family History   Problem Relation Age of Onset    Heart disease Mother     Heart disease Father     Heart failure Father     No Known Problems Sister     Heart disease Brother     Heart failure Brother     Heart attack Brother     No Known Problems Maternal Aunt     No Known Problems Maternal Uncle     No Known Problems Paternal Aunt     No Known Problems Paternal Uncle     No Known Problems Maternal Grandmother     Heart disease Maternal Grandfather     Heart failure Maternal Grandfather     No Known Problems Paternal Grandmother     No Known Problems Paternal Grandfather     No Known Problems Other     Anemia Neg Hx     Arrhythmia Neg Hx     Asthma Neg Hx     Clotting disorder Neg Hx     Fainting Neg Hx     Hyperlipidemia Neg Hx     Hypertension Neg Hx        Past Surgical History:   Procedure Laterality Date    CORONARY STENT PLACEMENT  09/2017    NO PAST SURGERIES         Past Medical History:   Diagnosis Date    Coronary artery disease     Hyperlipidemia     Hypertension     Myocardial infarction 09/15/2017       Social History     Socioeconomic History     "Marital status:    Tobacco Use    Smoking status: Every Day     Packs/day: 0.50     Years: 15.00     Additional pack years: 0.00     Total pack years: 7.50     Types: Cigarettes    Smokeless tobacco: Never   Vaping Use    Vaping Use: Never used   Substance and Sexual Activity    Alcohol use: No    Drug use: No    Sexual activity: Not Currently     Partners: Female     Birth control/protection: Condom, Abstinence         Procedures      Objective:       Physical Exam    /75 (BP Location: Left arm, Patient Position: Sitting, Cuff Size: Large Adult)   Pulse 76   Ht 182.9 cm (72\")   Wt 133 kg (293 lb)   SpO2 97%   BMI 39.74 kg/m²   The patient is alert, oriented and in no distress.    Vital signs as noted above.    Head and neck revealed no carotid bruits or jugular venous distension.  No thyromegaly or lymphadenopathy is present.    Lungs clear.  No wheezing.  Breath sounds are normal bilaterally.    Heart normal first and second heart sounds.  No murmur..  No pericardial rub is present.  No gallop is present.    Abdomen soft and nontender.  No organomegaly is present.    Extremities revealed good peripheral pulses without any pedal edema.    Skin warm and dry.    Musculoskeletal system is grossly normal.    CNS grossly normal.           Diagnosis Plan   1. Coronary artery disease involving native coronary artery of native heart without angina pectoris [I25.10]        2. Mixed hyperlipidemia        3. Primary hypertension        4. Family history of premature coronary artery disease        5. Palpitations        6. Tobacco abuse        7. Obstructive sleep apnea          LAB RESULTS (LAST 7 DAYS)    CBC        BMP        CMP         BNP        TROPONIN        CoAg        Creatinine Clearance  CrCl cannot be calculated (No successful lab value found.).    ABG        Radiology  No radiology results for the last day         Assessment and Plan       Diagnoses and all orders for this visit:    1. Coronary " artery disease involving native coronary artery of native heart without angina pectoris [I25.10] (Primary)    2. Mixed hyperlipidemia    3. Primary hypertension    4. Family history of premature coronary artery disease    5. Palpitations    6. Tobacco abuse    7. Obstructive sleep apnea    Other orders  -     carvedilol (COREG) 3.125 MG tablet; Take 1 tablet by mouth 2 (Two) Times a Day.  Dispense: 180 tablet; Refill: 3           Premature coronary artery disease  Status post anterior wall STEMI and PCI with a 2.5 x 20 mm drug-eluting stent in 2017  Currently chest pain-free  On aspirin and Plavix  Insurance did not cover Xarelto  Continue indefinite DAPT given history of premature CAD  Check HbA1c    Hypertension  Currently controlled  Continue with losartan  Switch atenolol to carvedilol    History of ischemic cardiomyopathy  EF was 35 to 40% during his MI and has not had a repeat since then  Switch atenolol to Coreg  Continue losartan  Obtain repeat echo  Appears euvolemic on exam    Hyperlipidemia  Increase atorvastatin to 80 mg  I do not have any lipid panels on him  Obtain lipid panel  Goal LDL is less than 70    Palpitations  Occasional and associated with anxiety  Continue with beta-blocker    Tobacco abuse  I have provided smoking cessation counseling for the patient today. I extensively discussed with the patient the cardiovascular risks associated with smoking and other tobacco products. Patient stated understanding and their questions were answered to their satisfaction.  I explained to him that this puts him at the greatest risk for in-stent stenosis on a repeat MI.        Amanda Woodard MD

## 2024-01-02 ENCOUNTER — OFFICE VISIT (OUTPATIENT)
Dept: CARDIOLOGY | Facility: CLINIC | Age: 39
End: 2024-01-02
Payer: COMMERCIAL

## 2024-01-02 VITALS
HEART RATE: 76 BPM | DIASTOLIC BLOOD PRESSURE: 75 MMHG | HEIGHT: 72 IN | WEIGHT: 293 LBS | OXYGEN SATURATION: 97 % | BODY MASS INDEX: 39.68 KG/M2 | SYSTOLIC BLOOD PRESSURE: 118 MMHG

## 2024-01-02 DIAGNOSIS — Z82.49 FAMILY HISTORY OF PREMATURE CORONARY ARTERY DISEASE: ICD-10-CM

## 2024-01-02 DIAGNOSIS — I10 PRIMARY HYPERTENSION: ICD-10-CM

## 2024-01-02 DIAGNOSIS — Z72.0 TOBACCO ABUSE: ICD-10-CM

## 2024-01-02 DIAGNOSIS — R00.2 PALPITATIONS: ICD-10-CM

## 2024-01-02 DIAGNOSIS — I25.10 CORONARY ARTERY DISEASE INVOLVING NATIVE CORONARY ARTERY OF NATIVE HEART WITHOUT ANGINA PECTORIS: Primary | ICD-10-CM

## 2024-01-02 DIAGNOSIS — E78.2 MIXED HYPERLIPIDEMIA: ICD-10-CM

## 2024-01-02 DIAGNOSIS — G47.33 OBSTRUCTIVE SLEEP APNEA: ICD-10-CM

## 2024-01-02 RX ORDER — CARVEDILOL 3.12 MG/1
3.12 TABLET ORAL 2 TIMES DAILY
Qty: 180 TABLET | Refills: 3 | Status: SHIPPED | OUTPATIENT
Start: 2024-01-02

## 2024-01-02 RX ORDER — ATENOLOL 50 MG/1
50 TABLET ORAL DAILY
COMMUNITY
Start: 2023-12-28 | End: 2024-01-02

## 2024-01-04 RX ORDER — ATORVASTATIN CALCIUM 80 MG/1
80 TABLET, FILM COATED ORAL DAILY
Qty: 90 TABLET | Refills: 3 | Status: SHIPPED | OUTPATIENT
Start: 2024-01-04

## 2024-01-04 RX ORDER — CLOPIDOGREL BISULFATE 75 MG/1
75 TABLET ORAL DAILY
Qty: 90 TABLET | Refills: 2 | Status: SHIPPED | OUTPATIENT
Start: 2024-01-04

## 2024-01-04 NOTE — TELEPHONE ENCOUNTER
Rx Refill Note  Requested Prescriptions     Pending Prescriptions Disp Refills    clopidogrel (PLAVIX) 75 MG tablet [Pharmacy Med Name: CLOPIDOGREL 75MG TABLETS] 90 tablet 2     Sig: TAKE 1 TABLET BY MOUTH DAILY      Last office visit with prescribing clinician: 7/12/2022   Last telemedicine visit with prescribing clinician: Visit date not found   Next office visit with prescribing clinician: Visit date not found                         Would you like a call back once the refill request has been completed: [] Yes [] No    If the office needs to give you a call back, can they leave a voicemail: [] Yes [] No    Page Kwon MA  01/04/24, 08:38 EST

## 2024-01-04 NOTE — TELEPHONE ENCOUNTER
Rx Refill Note  Requested Prescriptions     Pending Prescriptions Disp Refills    atorvastatin (LIPITOR) 80 MG tablet [Pharmacy Med Name: ATORVASTATIN 80MG TABLETS] 90 tablet 3     Sig: TAKE 1 TABLET BY MOUTH DAILY      Last office visit with prescribing clinician: 1/2/2024   Last telemedicine visit with prescribing clinician: Visit date not found   Next office visit with prescribing clinician: Visit date not found                         Would you like a call back once the refill request has been completed: [] Yes [] No    If the office needs to give you a call back, can they leave a voicemail: [] Yes [] No    Cary Blair MA  01/04/24, 12:57 EST

## 2024-01-23 ENCOUNTER — OUTSIDE FACILITY SERVICE (OUTPATIENT)
Dept: CARDIOLOGY | Facility: CLINIC | Age: 39
End: 2024-01-23
Payer: COMMERCIAL

## 2024-02-09 ENCOUNTER — TELEPHONE (OUTPATIENT)
Dept: CARDIOLOGY | Facility: CLINIC | Age: 39
End: 2024-02-09
Payer: COMMERCIAL

## 2024-10-14 RX ORDER — CLOPIDOGREL BISULFATE 75 MG/1
75 TABLET ORAL DAILY
Qty: 30 TABLET | Refills: 1 | Status: SHIPPED | OUTPATIENT
Start: 2024-10-14

## 2024-10-14 RX ORDER — CLOPIDOGREL BISULFATE 75 MG/1
75 TABLET ORAL DAILY
Qty: 90 TABLET | OUTPATIENT
Start: 2024-10-14

## 2025-01-10 RX ORDER — CLOPIDOGREL BISULFATE 75 MG/1
75 TABLET ORAL DAILY
Qty: 30 TABLET | Refills: 1 | OUTPATIENT
Start: 2025-01-10

## 2025-02-06 DIAGNOSIS — I10 PRIMARY HYPERTENSION: Primary | ICD-10-CM

## 2025-02-06 RX ORDER — CARVEDILOL 3.12 MG/1
3.12 TABLET ORAL 2 TIMES DAILY
Qty: 180 TABLET | Refills: 3 | Status: SHIPPED | OUTPATIENT
Start: 2025-02-06

## 2025-02-06 NOTE — TELEPHONE ENCOUNTER
Pt was last seen in June 2024 @ [place] office      Rx Refill Note  Requested Prescriptions     Signed Prescriptions Disp Refills    carvedilol (COREG) 3.125 MG tablet 180 tablet 3     Sig: Take 1 tablet by mouth 2 (Two) Times a Day.     Authorizing Provider: ABRAHAM LONG     Ordering User: HEMANTH MONDRAGON      Last office visit with prescribing clinician: Visit date not found   Last telemedicine visit with prescribing clinician: Visit date not found   Next office visit with prescribing clinician: Visit date not found                         Would you like a call back once the refill request has been completed: [] Yes [] No    If the office needs to give you a call back, can they leave a voicemail: [] Yes [] No    Hemanth Mondragon MA  02/06/25, 11:59 EST

## 2025-06-18 ENCOUNTER — OFFICE VISIT (OUTPATIENT)
Dept: CARDIOLOGY | Facility: CLINIC | Age: 40
End: 2025-06-18
Payer: COMMERCIAL

## 2025-06-18 VITALS
SYSTOLIC BLOOD PRESSURE: 124 MMHG | DIASTOLIC BLOOD PRESSURE: 78 MMHG | HEIGHT: 72 IN | OXYGEN SATURATION: 97 % | BODY MASS INDEX: 38.33 KG/M2 | WEIGHT: 283 LBS | HEART RATE: 69 BPM

## 2025-06-18 DIAGNOSIS — E78.2 MIXED HYPERLIPIDEMIA: ICD-10-CM

## 2025-06-18 DIAGNOSIS — I10 PRIMARY HYPERTENSION: ICD-10-CM

## 2025-06-18 DIAGNOSIS — Z82.49 FAMILY HISTORY OF PREMATURE CORONARY ARTERY DISEASE: ICD-10-CM

## 2025-06-18 DIAGNOSIS — R00.2 PALPITATIONS: ICD-10-CM

## 2025-06-18 DIAGNOSIS — I25.10 CORONARY ARTERY DISEASE INVOLVING NATIVE CORONARY ARTERY OF NATIVE HEART WITHOUT ANGINA PECTORIS: Primary | ICD-10-CM

## 2025-06-18 DIAGNOSIS — I20.0 UNSTABLE ANGINA: ICD-10-CM

## 2025-06-18 DIAGNOSIS — Z72.0 TOBACCO ABUSE: ICD-10-CM

## 2025-06-18 DIAGNOSIS — G47.33 OBSTRUCTIVE SLEEP APNEA: ICD-10-CM

## 2025-06-18 RX ORDER — DOXAZOSIN 2 MG/1
2 TABLET ORAL
COMMUNITY
Start: 2025-06-02

## 2025-06-18 RX ORDER — GABAPENTIN 400 MG/1
1 CAPSULE ORAL 3 TIMES DAILY
COMMUNITY
Start: 2025-05-22

## 2025-06-18 NOTE — H&P (VIEW-ONLY)
Encounter Date:06/18/2025        Patient ID: Clark Randolph is a 39 y.o. male.      Chief Complaint:      History of Present Illness    39-year-old with past medical history of premature coronary artery disease status post PCI of the LAD in the setting of anterior wall MI in 2017 at the age of 32 with a Xience 2.5 x 28 mm stent, hypertension, hyperlipidemia, tobacco abuse who presents as a follow-up.     ECG shows normal sinus rhythm with Q waves in the anterior leads.  Heart rate 69, , QRS 80 and QTc 428 ms    Current cardiac medications include Plavix, atorvastatin, losartan, Coreg and doxazosin.    Today he complains of increasing chest tightness, shortness of breath with minimal activity.  These are similar symptoms he had in 2017.  He is very concerned about stent restenosis or Denovo lesions due to his worsening symptoms.    The following portions of the patient's history were reviewed and updated as appropriate: allergies, current medications, past family history, past medical history, past social history, past surgical history, and problem list.    Review of Systems   Constitutional: Positive for malaise/fatigue.   Cardiovascular:  Negative for chest pain, dyspnea on exertion, leg swelling and palpitations.   Respiratory:  Positive for shortness of breath. Negative for cough.    Gastrointestinal:  Positive for nausea. Negative for abdominal pain and vomiting.   Neurological:  Positive for dizziness, light-headedness and numbness. Negative for headaches and weakness.   All other systems reviewed and are negative.        Current Outpatient Medications:     atorvastatin (LIPITOR) 80 MG tablet, TAKE 1 TABLET BY MOUTH DAILY, Disp: 90 tablet, Rfl: 3    carvedilol (COREG) 3.125 MG tablet, Take 1 tablet by mouth 2 (Two) Times a Day., Disp: 180 tablet, Rfl: 3    clopidogrel (PLAVIX) 75 MG tablet, Take 1 tablet by mouth Daily. 30 days only needs an appointment for more refills., Disp: 30 tablet, Rfl: 1     doxazosin (CARDURA) 2 MG tablet, Take 1 tablet by mouth every night at bedtime., Disp: , Rfl:     gabapentin (NEURONTIN) 400 MG capsule, Take 1 capsule by mouth 3 times a day., Disp: , Rfl:     LORazepam (ATIVAN) 0.5 MG tablet, Take 1 tablet by mouth 2 (Two) Times a Day., Disp: , Rfl: 0    losartan (COZAAR) 100 MG tablet, Take 1 tablet by mouth Daily., Disp: , Rfl: 0    traMADol (ULTRAM) 50 MG tablet, Take 2 tablets by mouth 2 (Two) Times a Day., Disp: , Rfl: 0    Current outpatient and discharge medications have been reconciled for the patient.  Reviewed by: Leonard Richardson MD       Allergies   Allergen Reactions    Metoprolol Myalgia       Family History   Problem Relation Age of Onset    Heart disease Mother     Heart disease Father     Heart failure Father     No Known Problems Sister     Heart disease Brother     Heart failure Brother     Heart attack Brother     No Known Problems Maternal Aunt     No Known Problems Maternal Uncle     No Known Problems Paternal Aunt     No Known Problems Paternal Uncle     No Known Problems Maternal Grandmother     Heart disease Maternal Grandfather     Heart failure Maternal Grandfather     No Known Problems Paternal Grandmother     No Known Problems Paternal Grandfather     No Known Problems Other     Anemia Neg Hx     Arrhythmia Neg Hx     Asthma Neg Hx     Clotting disorder Neg Hx     Fainting Neg Hx     Hyperlipidemia Neg Hx     Hypertension Neg Hx        Past Surgical History:   Procedure Laterality Date    CORONARY STENT PLACEMENT  09/2017    NO PAST SURGERIES         Past Medical History:   Diagnosis Date    Coronary artery disease     Hyperlipidemia     Hypertension     Myocardial infarction 09/15/2017       Family History   Problem Relation Age of Onset    Heart disease Mother     Heart disease Father     Heart failure Father     No Known Problems Sister     Heart disease Brother     Heart failure Brother     Heart attack Brother     No Known Problems Maternal Aunt      "No Known Problems Maternal Uncle     No Known Problems Paternal Aunt     No Known Problems Paternal Uncle     No Known Problems Maternal Grandmother     Heart disease Maternal Grandfather     Heart failure Maternal Grandfather     No Known Problems Paternal Grandmother     No Known Problems Paternal Grandfather     No Known Problems Other     Anemia Neg Hx     Arrhythmia Neg Hx     Asthma Neg Hx     Clotting disorder Neg Hx     Fainting Neg Hx     Hyperlipidemia Neg Hx     Hypertension Neg Hx        Social History     Socioeconomic History    Marital status:    Tobacco Use    Smoking status: Every Day     Current packs/day: 0.50     Average packs/day: 0.5 packs/day for 15.0 years (7.5 ttl pk-yrs)     Types: Cigarettes     Passive exposure: Current    Smokeless tobacco: Never   Vaping Use    Vaping status: Never Used   Substance and Sexual Activity    Alcohol use: No    Drug use: No    Sexual activity: Not Currently     Partners: Female     Birth control/protection: Condom, Abstinence               Objective:       Physical Exam    /78 (BP Location: Left arm, Patient Position: Sitting, Cuff Size: Large Adult)   Pulse 69   Ht 182.9 cm (72\")   Wt 128 kg (283 lb)   SpO2 97%   BMI 38.38 kg/m²   The patient is alert, oriented and in no distress.    Vital signs as noted above.    Head and neck revealed no carotid bruits or jugular venous distension.  No thyromegaly or lymphadenopathy is present.    Lungs clear.  No wheezing.  Breath sounds are normal bilaterally.    Heart normal first and second heart sounds.  No murmur..  No pericardial rub is present.  No gallop is present.    Abdomen soft and nontender.  No organomegaly is present.    Extremities revealed good peripheral pulses without any pedal edema.    Skin warm and dry.    Musculoskeletal system is grossly normal.    CNS grossly normal.           Diagnosis Plan   1. Coronary artery disease involving native coronary artery of native heart without " angina pectoris [I25.10]        2. Primary hypertension        3. Mixed hyperlipidemia        4. Family history of premature coronary artery disease        5. Palpitations        6. Tobacco abuse        7. Obstructive sleep apnea        LAB RESULTS (LAST 7 DAYS)    CBC        BMP        CMP         BNP        TROPONIN        CoAg        Creatinine Clearance  CrCl cannot be calculated (No successful lab value found.).    ABG        Radiology  No radiology results for the last day    EKG  Procedures    Stress test      Echocardiogram      Cardiac catheterization  No results found for this or any previous visit.          Assessment and Plan       Diagnoses and all orders for this visit:    1. Coronary artery disease involving native coronary artery of native heart without angina pectoris [I25.10] (Primary)    2. Primary hypertension    3. Mixed hyperlipidemia    4. Family history of premature coronary artery disease    5. Palpitations    6. Tobacco abuse    7. Obstructive sleep apnea       Unstable angina  Premature coronary artery disease  Status post anterior wall STEMI and PCI with a 2.5 x 20 mm drug-eluting stent in 2017  Concerning symptoms of worsening chest pain  Continue Plavix, statin and beta-blocker  Insurance did not cover low-dose Xarelto  I will offer definitive coronary angiography due to worsening symptoms.  He may have ISR given the small size stent.    Hypertension  Currently controlled  Continue losartan and Coreg     Failure with midrange ejection fraction  EF was 35 to 40% during his MI and has not had a repeat since then  Repeat echocardiogram shows EF of 45%, LVIDD 5.9 cm.  RVSP 42 mmHg.  Continue losartan and Coreg  Appears euvolemic on exam     Hyperlipidemia  Continue high intensity statin  LDL 78, HDL 29, triglycerides 126 and total cholesterol 130  Goal LDL less than 55  A1c is 5     Palpitations  Occasional and associated with anxiety  Continue Coreg     Tobacco abuse  Smoking cessation  counseling provided to the patient  He has cut down but continues to smoke    Obesity  BMI is 38.4.  He weighs 283 pounds.  Lifestyle modifications recommended to the patient.  Obesity complicates all aspects of his care    This is my first time seeing this patient and I spent 25 minutes reviewing the historical documentation, notes, reports, labs, medications in addition to the 35 minutes in evaluating the patient today and then documentation for today's progress note.

## 2025-06-27 ENCOUNTER — HOSPITAL ENCOUNTER (OUTPATIENT)
Facility: HOSPITAL | Age: 40
Setting detail: OBSERVATION
Discharge: HOME | End: 2025-06-27
Attending: EMERGENCY MEDICINE | Admitting: EMERGENCY MEDICINE
Payer: COMMERCIAL

## 2025-06-27 ENCOUNTER — APPOINTMENT (OUTPATIENT)
Dept: GENERAL RADIOLOGY | Facility: HOSPITAL | Age: 40
End: 2025-06-27
Payer: COMMERCIAL

## 2025-06-27 VITALS
RESPIRATION RATE: 15 BRPM | HEART RATE: 51 BPM | WEIGHT: 279.98 LBS | TEMPERATURE: 97.9 F | BODY MASS INDEX: 37.92 KG/M2 | HEIGHT: 72 IN | DIASTOLIC BLOOD PRESSURE: 92 MMHG | SYSTOLIC BLOOD PRESSURE: 145 MMHG | OXYGEN SATURATION: 95 %

## 2025-06-27 DIAGNOSIS — R07.9 CHEST PAIN, UNSPECIFIED TYPE: Primary | ICD-10-CM

## 2025-06-27 LAB
ALBUMIN SERPL-MCNC: 4.3 G/DL (ref 3.5–5.2)
ALBUMIN/GLOB SERPL: 1.9 G/DL
ALP SERPL-CCNC: 127 U/L (ref 39–117)
ALT SERPL W P-5'-P-CCNC: 18 U/L (ref 1–41)
ANION GAP SERPL CALCULATED.3IONS-SCNC: 11 MMOL/L (ref 5–15)
AST SERPL-CCNC: 19 U/L (ref 1–40)
BASOPHILS # BLD AUTO: 0.06 10*3/MM3 (ref 0–0.2)
BASOPHILS NFR BLD AUTO: 0.7 % (ref 0–1.5)
BILIRUB SERPL-MCNC: 0.4 MG/DL (ref 0–1.2)
BUN SERPL-MCNC: 11.8 MG/DL (ref 6–20)
BUN/CREAT SERPL: 12 (ref 7–25)
CALCIUM SPEC-SCNC: 9.1 MG/DL (ref 8.6–10.5)
CHLORIDE SERPL-SCNC: 106 MMOL/L (ref 98–107)
CO2 SERPL-SCNC: 22 MMOL/L (ref 22–29)
CREAT SERPL-MCNC: 0.98 MG/DL (ref 0.76–1.27)
DEPRECATED RDW RBC AUTO: 39.9 FL (ref 37–54)
EGFRCR SERPLBLD CKD-EPI 2021: 100 ML/MIN/1.73
EOSINOPHIL # BLD AUTO: 0.16 10*3/MM3 (ref 0–0.4)
EOSINOPHIL NFR BLD AUTO: 1.8 % (ref 0.3–6.2)
ERYTHROCYTE [DISTWIDTH] IN BLOOD BY AUTOMATED COUNT: 12.8 % (ref 12.3–15.4)
GEN 5 1HR TROPONIN T REFLEX: 9 NG/L
GLOBULIN UR ELPH-MCNC: 2.3 GM/DL
GLUCOSE SERPL-MCNC: 84 MG/DL (ref 65–99)
HCT VFR BLD AUTO: 42.6 % (ref 37.5–51)
HGB BLD-MCNC: 14.4 G/DL (ref 13–17.7)
IMM GRANULOCYTES # BLD AUTO: 0.02 10*3/MM3 (ref 0–0.05)
IMM GRANULOCYTES NFR BLD AUTO: 0.2 % (ref 0–0.5)
LYMPHOCYTES # BLD AUTO: 2.6 10*3/MM3 (ref 0.7–3.1)
LYMPHOCYTES NFR BLD AUTO: 29.2 % (ref 19.6–45.3)
MCH RBC QN AUTO: 29.3 PG (ref 26.6–33)
MCHC RBC AUTO-ENTMCNC: 33.8 G/DL (ref 31.5–35.7)
MCV RBC AUTO: 86.6 FL (ref 79–97)
MONOCYTES # BLD AUTO: 0.77 10*3/MM3 (ref 0.1–0.9)
MONOCYTES NFR BLD AUTO: 8.7 % (ref 5–12)
NEUTROPHILS NFR BLD AUTO: 5.29 10*3/MM3 (ref 1.7–7)
NEUTROPHILS NFR BLD AUTO: 59.4 % (ref 42.7–76)
NRBC BLD AUTO-RTO: 0 /100 WBC (ref 0–0.2)
PLATELET # BLD AUTO: 234 10*3/MM3 (ref 140–450)
PMV BLD AUTO: 10.2 FL (ref 6–12)
POTASSIUM SERPL-SCNC: 4.1 MMOL/L (ref 3.5–5.2)
PROT SERPL-MCNC: 6.6 G/DL (ref 6–8.5)
RBC # BLD AUTO: 4.92 10*6/MM3 (ref 4.14–5.8)
SODIUM SERPL-SCNC: 139 MMOL/L (ref 136–145)
TROPONIN T NUMERIC DELTA: 1 NG/L
TROPONIN T SERPL HS-MCNC: 8 NG/L
WBC NRBC COR # BLD AUTO: 8.9 10*3/MM3 (ref 3.4–10.8)

## 2025-06-27 PROCEDURE — 84484 ASSAY OF TROPONIN QUANT: CPT | Performed by: EMERGENCY MEDICINE

## 2025-06-27 PROCEDURE — G0378 HOSPITAL OBSERVATION PER HR: HCPCS

## 2025-06-27 PROCEDURE — 71045 X-RAY EXAM CHEST 1 VIEW: CPT

## 2025-06-27 PROCEDURE — 80053 COMPREHEN METABOLIC PANEL: CPT | Performed by: EMERGENCY MEDICINE

## 2025-06-27 PROCEDURE — 36415 COLL VENOUS BLD VENIPUNCTURE: CPT

## 2025-06-27 PROCEDURE — 99285 EMERGENCY DEPT VISIT HI MDM: CPT

## 2025-06-27 PROCEDURE — 99213 OFFICE O/P EST LOW 20 MIN: CPT | Performed by: NURSE PRACTITIONER

## 2025-06-27 PROCEDURE — 85025 COMPLETE CBC W/AUTO DIFF WBC: CPT | Performed by: EMERGENCY MEDICINE

## 2025-06-27 RX ORDER — SODIUM CHLORIDE 0.9 % (FLUSH) 0.9 %
10 SYRINGE (ML) INJECTION EVERY 12 HOURS SCHEDULED
Status: DISCONTINUED | OUTPATIENT
Start: 2025-06-27 | End: 2025-06-27 | Stop reason: HOSPADM

## 2025-06-27 RX ORDER — PANTOPRAZOLE SODIUM 40 MG/1
40 TABLET, DELAYED RELEASE ORAL DAILY
COMMUNITY

## 2025-06-27 RX ORDER — NALOXONE HCL 0.4 MG/ML
0.4 VIAL (ML) INJECTION
Status: DISCONTINUED | OUTPATIENT
Start: 2025-06-27 | End: 2025-06-27 | Stop reason: HOSPADM

## 2025-06-27 RX ORDER — NITROGLYCERIN 0.4 MG/1
0.4 TABLET SUBLINGUAL
Status: DISCONTINUED | OUTPATIENT
Start: 2025-06-27 | End: 2025-06-27 | Stop reason: SDUPTHER

## 2025-06-27 RX ORDER — BISACODYL 10 MG
10 SUPPOSITORY, RECTAL RECTAL DAILY PRN
Status: DISCONTINUED | OUTPATIENT
Start: 2025-06-27 | End: 2025-06-27 | Stop reason: HOSPADM

## 2025-06-27 RX ORDER — POLYETHYLENE GLYCOL 3350 17 G/17G
17 POWDER, FOR SOLUTION ORAL DAILY PRN
Status: DISCONTINUED | OUTPATIENT
Start: 2025-06-27 | End: 2025-06-27 | Stop reason: HOSPADM

## 2025-06-27 RX ORDER — ASPIRIN 81 MG/1
324 TABLET, CHEWABLE ORAL ONCE
Status: COMPLETED | OUTPATIENT
Start: 2025-06-27 | End: 2025-06-27

## 2025-06-27 RX ORDER — NITROGLYCERIN 0.4 MG/1
TABLET SUBLINGUAL
Qty: 15 TABLET | Refills: 2 | Status: SHIPPED | OUTPATIENT
Start: 2025-06-27

## 2025-06-27 RX ORDER — BISACODYL 5 MG/1
5 TABLET, DELAYED RELEASE ORAL DAILY PRN
Status: DISCONTINUED | OUTPATIENT
Start: 2025-06-27 | End: 2025-06-27 | Stop reason: HOSPADM

## 2025-06-27 RX ORDER — NITROGLYCERIN 0.4 MG/1
0.4 TABLET SUBLINGUAL
Status: DISCONTINUED | OUTPATIENT
Start: 2025-06-27 | End: 2025-06-27 | Stop reason: HOSPADM

## 2025-06-27 RX ORDER — MORPHINE SULFATE 2 MG/ML
1 INJECTION, SOLUTION INTRAMUSCULAR; INTRAVENOUS EVERY 4 HOURS PRN
Status: DISCONTINUED | OUTPATIENT
Start: 2025-06-27 | End: 2025-06-27 | Stop reason: HOSPADM

## 2025-06-27 RX ORDER — SODIUM CHLORIDE 0.9 % (FLUSH) 0.9 %
10 SYRINGE (ML) INJECTION AS NEEDED
Status: DISCONTINUED | OUTPATIENT
Start: 2025-06-27 | End: 2025-06-27 | Stop reason: HOSPADM

## 2025-06-27 RX ORDER — AMOXICILLIN 250 MG
2 CAPSULE ORAL 2 TIMES DAILY
Status: DISCONTINUED | OUTPATIENT
Start: 2025-06-27 | End: 2025-06-27 | Stop reason: HOSPADM

## 2025-06-27 RX ORDER — CARVEDILOL 3.12 MG/1
3.12 TABLET ORAL DAILY
Status: ON HOLD | COMMUNITY
End: 2025-07-03

## 2025-06-27 RX ORDER — SODIUM CHLORIDE 9 MG/ML
40 INJECTION, SOLUTION INTRAVENOUS AS NEEDED
Status: DISCONTINUED | OUTPATIENT
Start: 2025-06-27 | End: 2025-06-27 | Stop reason: HOSPADM

## 2025-06-27 RX ORDER — ATORVASTATIN CALCIUM 40 MG/1
40 TABLET, FILM COATED ORAL NIGHTLY
COMMUNITY

## 2025-06-27 RX ADMIN — ASPIRIN 81 MG CHEWABLE TABLET 324 MG: 81 TABLET CHEWABLE at 04:53

## 2025-06-27 NOTE — ED PROVIDER NOTES
Subjective   History of Present Illness  Chief complaint chest tightness left arm pain dizzy    History of present illness this a 39-year-old male with a history of heart disease and stent placement in the  maker he states who states over the last 10 days he has been having some tightness in the chest and left arm discomfort and dizziness.  Patient states it happens mostly in the evening.  He has seen his cardiologist on the 18th and is scheduled for heart cath on July 3.  He states that he seems to be getting worse so he came to the ER this morning no current discomfort.  Nothing really seem to trigger or make it better or worse.  No recent illness flus viruses vaccinations foreign travels leg pain or swelling.      Review of Systems   Constitutional:  Negative for chills and fever.   Respiratory:  Positive for chest tightness and shortness of breath.    Cardiovascular:  Positive for chest pain. Negative for leg swelling.   Gastrointestinal:  Negative for abdominal pain and vomiting.   Musculoskeletal:  Negative for back pain and neck pain.   Skin:  Negative for rash.   Neurological:  Positive for dizziness and light-headedness.   Psychiatric/Behavioral:  Negative for confusion.        Past Medical History:   Diagnosis Date    Coronary artery disease     Hyperlipidemia     Hypertension     Myocardial infarction 09/15/2017       Allergies   Allergen Reactions    Metoprolol Myalgia       Past Surgical History:   Procedure Laterality Date    CORONARY STENT PLACEMENT  09/2017    NO PAST SURGERIES         Family History   Problem Relation Age of Onset    Heart disease Mother     Heart disease Father     Heart failure Father     No Known Problems Sister     Heart disease Brother     Heart failure Brother     Heart attack Brother     No Known Problems Maternal Aunt     No Known Problems Maternal Uncle     No Known Problems Paternal Aunt     No Known Problems Paternal Uncle     No Known Problems Maternal Grandmother      Heart disease Maternal Grandfather     Heart failure Maternal Grandfather     No Known Problems Paternal Grandmother     No Known Problems Paternal Grandfather     No Known Problems Other     Anemia Neg Hx     Arrhythmia Neg Hx     Asthma Neg Hx     Clotting disorder Neg Hx     Fainting Neg Hx     Hyperlipidemia Neg Hx     Hypertension Neg Hx        Social History     Socioeconomic History    Marital status:    Tobacco Use    Smoking status: Every Day     Current packs/day: 0.50     Average packs/day: 0.5 packs/day for 15.0 years (7.5 ttl pk-yrs)     Types: Cigarettes     Passive exposure: Current    Smokeless tobacco: Never   Vaping Use    Vaping status: Never Used   Substance and Sexual Activity    Alcohol use: No    Drug use: No    Sexual activity: Not Currently     Partners: Female     Birth control/protection: Condom, Abstinence       Prior to Admission medications    Medication Sig Start Date End Date Taking? Authorizing Provider   atorvastatin (LIPITOR) 80 MG tablet TAKE 1 TABLET BY MOUTH DAILY 1/4/24   Amanda Woodard MD   carvedilol (COREG) 3.125 MG tablet Take 1 tablet by mouth 2 (Two) Times a Day. 2/6/25   Leonard Richardson MD   clopidogrel (PLAVIX) 75 MG tablet Take 1 tablet by mouth Daily. 30 days only needs an appointment for more refills. 10/14/24   Leonard Richardson MD   doxazosin (CARDURA) 2 MG tablet Take 1 tablet by mouth every night at bedtime. 6/2/25   Lauro Toledo MD   gabapentin (NEURONTIN) 400 MG capsule Take 1 capsule by mouth 3 times a day. 5/22/25   Lauro Toledo MD   LORazepam (ATIVAN) 0.5 MG tablet Take 1 tablet by mouth 2 (Two) Times a Day. 7/26/19   Lauro Toledo MD   losartan (COZAAR) 100 MG tablet Take 1 tablet by mouth Daily. 7/24/19   Lauro Toledo MD   traMADol (ULTRAM) 50 MG tablet Take 2 tablets by mouth 2 (Two) Times a Day. 7/13/19   Lauro Toledo MD        Objective   Physical Exam  Constitutional this is a 39-year-old gentleman  awake alert no acute distress triage vital signs have been reviewed.  HEENT extraocular muscles are intact pupils equal round reactive sclera clear the mouth is clear neck is supple there is no adenopathy no JV no bruits no meningeal signs lungs clear no retraction no use of accessories.  Heart is regular without murmur rub abdomen soft nontender good bowel sounds no peritoneal findings or pulsatile masses extremities pulses equal upper and lower extremities no edema no cords no Homans' sign no evidence of DVT skin is warm and dry without rashes or cellulitic changes neurologic awake alert follows commands motor strength normal without focal weak  Procedures           ED Course      Results for orders placed or performed during the hospital encounter of 06/27/25   Comprehensive Metabolic Panel    Collection Time: 06/27/25  3:25 AM    Specimen: Blood   Result Value Ref Range    Glucose 84 65 - 99 mg/dL    BUN 11.8 6.0 - 20.0 mg/dL    Creatinine 0.98 0.76 - 1.27 mg/dL    Sodium 139 136 - 145 mmol/L    Potassium 4.1 3.5 - 5.2 mmol/L    Chloride 106 98 - 107 mmol/L    CO2 22.0 22.0 - 29.0 mmol/L    Calcium 9.1 8.6 - 10.5 mg/dL    Total Protein 6.6 6.0 - 8.5 g/dL    Albumin 4.3 3.5 - 5.2 g/dL    ALT (SGPT) 18 1 - 41 U/L    AST (SGOT) 19 1 - 40 U/L    Alkaline Phosphatase 127 (H) 39 - 117 U/L    Total Bilirubin 0.4 0.0 - 1.2 mg/dL    Globulin 2.3 gm/dL    A/G Ratio 1.9 g/dL    BUN/Creatinine Ratio 12.0 7.0 - 25.0    Anion Gap 11.0 5.0 - 15.0 mmol/L    eGFR 100.0 >60.0 mL/min/1.73   High Sensitivity Troponin T    Collection Time: 06/27/25  3:25 AM    Specimen: Blood   Result Value Ref Range    HS Troponin T 8 <22 ng/L   CBC Auto Differential    Collection Time: 06/27/25  3:25 AM    Specimen: Blood   Result Value Ref Range    WBC 8.90 3.40 - 10.80 10*3/mm3    RBC 4.92 4.14 - 5.80 10*6/mm3    Hemoglobin 14.4 13.0 - 17.7 g/dL    Hematocrit 42.6 37.5 - 51.0 %    MCV 86.6 79.0 - 97.0 fL    MCH 29.3 26.6 - 33.0 pg    MCHC 33.8  31.5 - 35.7 g/dL    RDW 12.8 12.3 - 15.4 %    RDW-SD 39.9 37.0 - 54.0 fl    MPV 10.2 6.0 - 12.0 fL    Platelets 234 140 - 450 10*3/mm3    Neutrophil % 59.4 42.7 - 76.0 %    Lymphocyte % 29.2 19.6 - 45.3 %    Monocyte % 8.7 5.0 - 12.0 %    Eosinophil % 1.8 0.3 - 6.2 %    Basophil % 0.7 0.0 - 1.5 %    Immature Grans % 0.2 0.0 - 0.5 %    Neutrophils, Absolute 5.29 1.70 - 7.00 10*3/mm3    Lymphocytes, Absolute 2.60 0.70 - 3.10 10*3/mm3    Monocytes, Absolute 0.77 0.10 - 0.90 10*3/mm3    Eosinophils, Absolute 0.16 0.00 - 0.40 10*3/mm3    Basophils, Absolute 0.06 0.00 - 0.20 10*3/mm3    Immature Grans, Absolute 0.02 0.00 - 0.05 10*3/mm3    nRBC 0.0 0.0 - 0.2 /100 WBC     XR Chest 1 View  Result Date: 6/27/2025  Cardiomegaly without evidence of failure or pneumonia. Electronically Signed: Mahamed Trinidad MD  6/27/2025 4:49 AM EDT  Workstation ID: [id number]    Medications   sodium chloride 0.9 % flush 10 mL (has no administration in time range)   aspirin chewable tablet 324 mg (324 mg Oral Given 6/27/25 0453)     Results for orders placed or performed during the hospital encounter of 06/27/25   Comprehensive Metabolic Panel    Collection Time: 06/27/25  3:25 AM    Specimen: Blood   Result Value Ref Range    Glucose 84 65 - 99 mg/dL    BUN 11.8 6.0 - 20.0 mg/dL    Creatinine 0.98 0.76 - 1.27 mg/dL    Sodium 139 136 - 145 mmol/L    Potassium 4.1 3.5 - 5.2 mmol/L    Chloride 106 98 - 107 mmol/L    CO2 22.0 22.0 - 29.0 mmol/L    Calcium 9.1 8.6 - 10.5 mg/dL    Total Protein 6.6 6.0 - 8.5 g/dL    Albumin 4.3 3.5 - 5.2 g/dL    ALT (SGPT) 18 1 - 41 U/L    AST (SGOT) 19 1 - 40 U/L    Alkaline Phosphatase 127 (H) 39 - 117 U/L    Total Bilirubin 0.4 0.0 - 1.2 mg/dL    Globulin 2.3 gm/dL    A/G Ratio 1.9 g/dL    BUN/Creatinine Ratio 12.0 7.0 - 25.0    Anion Gap 11.0 5.0 - 15.0 mmol/L    eGFR 100.0 >60.0 mL/min/1.73   High Sensitivity Troponin T    Collection Time: 06/27/25  3:25 AM    Specimen: Blood   Result Value Ref Range    HS  Troponin T 8 <22 ng/L   CBC Auto Differential    Collection Time: 06/27/25  3:25 AM    Specimen: Blood   Result Value Ref Range    WBC 8.90 3.40 - 10.80 10*3/mm3    RBC 4.92 4.14 - 5.80 10*6/mm3    Hemoglobin 14.4 13.0 - 17.7 g/dL    Hematocrit 42.6 37.5 - 51.0 %    MCV 86.6 79.0 - 97.0 fL    MCH 29.3 26.6 - 33.0 pg    MCHC 33.8 31.5 - 35.7 g/dL    RDW 12.8 12.3 - 15.4 %    RDW-SD 39.9 37.0 - 54.0 fl    MPV 10.2 6.0 - 12.0 fL    Platelets 234 140 - 450 10*3/mm3    Neutrophil % 59.4 42.7 - 76.0 %    Lymphocyte % 29.2 19.6 - 45.3 %    Monocyte % 8.7 5.0 - 12.0 %    Eosinophil % 1.8 0.3 - 6.2 %    Basophil % 0.7 0.0 - 1.5 %    Immature Grans % 0.2 0.0 - 0.5 %    Neutrophils, Absolute 5.29 1.70 - 7.00 10*3/mm3    Lymphocytes, Absolute 2.60 0.70 - 3.10 10*3/mm3    Monocytes, Absolute 0.77 0.10 - 0.90 10*3/mm3    Eosinophils, Absolute 0.16 0.00 - 0.40 10*3/mm3    Basophils, Absolute 0.06 0.00 - 0.20 10*3/mm3    Immature Grans, Absolute 0.02 0.00 - 0.05 10*3/mm3    nRBC 0.0 0.0 - 0.2 /100 WBC     XR Chest 1 View  Result Date: 6/27/2025  Cardiomegaly without evidence of failure or pneumonia. Electronically Signed: Mahamed Trinidad MD  6/27/2025 4:49 AM EDT  Workstation ID: [id number]    Medications   sodium chloride 0.9 % flush 10 mL (has no administration in time range)   aspirin chewable tablet 324 mg (324 mg Oral Given 6/27/25 0453)                   HEART Score: 4   Shared Decision Making  I discussed the findings with the patient/patient representative who is in agreement with the treatment plan and the final disposition.  Risks and benefits of discharge and/or observation/admission were discussed: Yes                          EKG my interpretation normal sinus rhythm rate of 70 normal axis hypertrophy some Q waves noted anteriorly QTc of 435 abnormal EKG I have nothing currently old to compare with abnormal EKG            Medical Decision Making  Decision-making IV established monitor placed by review of sinus rhythm  aspirin provided EKG my interpretation normal sinus rhythm rate of 70 normal axis hypertrophy QTc was 435 Q waves anteriorly nothing able to compare with an abnormal EKG.  Chest x-ray my independent review no pneumonia pneumothorax failure acute findings.  Radiology shows cardiomegaly without other acute findings.  Labs obtained by independent review CBC and comprehensive metabolic profile unremarkable first troponin was 8 repeat is pending.  Patient repeat examination at 5:00 AM is resting comfortably no distress in no pain vital signs are stable.  I do not see any evidence of acute myocardial infarction DVT pulmonary embolism and dissection pericarditis myocarditis pericardial effusion sepsis based on history and physical and clinical findings although not a complete list of all possibilities.  Patiently placed observation consultation with Dr. Richardson in the morning.  Record shows that he was in the office recently on the 18th of this month and has been scheduled for heart catheterization for concerns of unstable angina.  Stable otherwise unremarkable ER course    Problems Addressed:  Chest pain, unspecified type: complicated acute illness or injury    Amount and/or Complexity of Data Reviewed  External Data Reviewed: notes.  Labs: ordered. Decision-making details documented in ED Course.  Radiology: ordered and independent interpretation performed. Decision-making details documented in ED Course.    Risk  Decision regarding hospitalization.        Final diagnoses:   Chest pain, unspecified type       ED Disposition  ED Disposition       ED Disposition   Decision to Admit    Condition   --    Comment   --               No follow-up provider specified.       Medication List      No changes were made to your prescriptions during this visit.            Chan Reardon MD  06/27/25 0569

## 2025-06-27 NOTE — NURSING NOTE
Patient left AMA. Patient was frustrated and felt we were not doing anything for him. Mom stated that she could do for him at home what we were doing for him here.

## 2025-06-27 NOTE — CONSULTS
[facility]   CONSULT NOTE    Referring Provider: Chan Reardon MD    Reason for Consultation: chest pain      Patient Care Team:  Kwadwo Tovar MD as PCP - General  Cam Garcia MD as Consulting Physician (Cardiology)      Chief complaint: chest pain    History of present illness:  Clark Randolph is a 39 y.o. male with past medical history of  CAD s/p stent, hypertension, hyperlipidemia, MI who presented to the ED with chest pain. Patient reports chest pain that included tingling in his left hand and wrist. He is scheduled for a left heart cath 7/3/2025.  Patient denies current chest pain, palpitations, shortness of breath, or syncope.    ROS  As above in HPI    History  Past Medical History:   Diagnosis Date    Coronary artery disease     Hyperlipidemia     Hypertension     Myocardial infarction 09/15/2017       Past Surgical History:   Procedure Laterality Date    CORONARY STENT PLACEMENT  09/2017    NO PAST SURGERIES         Family History   Problem Relation Age of Onset    Heart disease Mother     Heart disease Father     Heart failure Father     No Known Problems Sister     Heart disease Brother     Heart failure Brother     Heart attack Brother     No Known Problems Maternal Aunt     No Known Problems Maternal Uncle     No Known Problems Paternal Aunt     No Known Problems Paternal Uncle     No Known Problems Maternal Grandmother     Heart disease Maternal Grandfather     Heart failure Maternal Grandfather     No Known Problems Paternal Grandmother     No Known Problems Paternal Grandfather     No Known Problems Other     Anemia Neg Hx     Arrhythmia Neg Hx     Asthma Neg Hx     Clotting disorder Neg Hx     Fainting Neg Hx     Hyperlipidemia Neg Hx     Hypertension Neg Hx        Social History     Tobacco Use    Smoking status: Every Day     Current packs/day: 0.50     Average packs/day: 0.5 packs/day for 15.0 years (7.5 ttl pk-yrs)     Types: Cigarettes      "Passive exposure: Current    Smokeless tobacco: Never   Vaping Use    Vaping status: Never Used   Substance Use Topics    Alcohol use: No    Drug use: Yes     Types: Marijuana        Medications Prior to Admission   Medication Sig Dispense Refill Last Dose/Taking    atorvastatin (LIPITOR) 40 MG tablet Take 1 tablet by mouth Every Night.   6/26/2025 Evening    carvedilol (COREG) 3.125 MG tablet Take 1 tablet by mouth Daily.   6/26/2025    clopidogrel (PLAVIX) 75 MG tablet Take 1 tablet by mouth Daily. 30 days only needs an appointment for more refills. 30 tablet 1 6/26/2025    doxazosin (CARDURA) 2 MG tablet Take 1 tablet by mouth every night at bedtime.   6/26/2025 Evening    gabapentin (NEURONTIN) 400 MG capsule Take 1 capsule by mouth 3 times a day.   6/26/2025 Evening    LORazepam (ATIVAN) 0.5 MG tablet Take 1 tablet by mouth 2 (Two) Times a Day.  0 6/26/2025 Evening    losartan (COZAAR) 100 MG tablet Take 1 tablet by mouth Daily.  0 6/26/2025    pantoprazole (PROTONIX) 40 MG EC tablet Take 1 tablet by mouth Daily.   6/26/2025    traMADol (ULTRAM) 50 MG tablet Take 2 tablets by mouth 2 (Two) Times a Day.  0 6/26/2025 Evening         Metoprolol    Scheduled Meds:  senna-docusate sodium, 2 tablet, Oral, BID  sodium chloride, 10 mL, Intravenous, Q12H        Continuous Infusions:       PRN Meds:    senna-docusate sodium **AND** polyethylene glycol **AND** bisacodyl **AND** bisacodyl    Morphine **AND** naloxone    nitroglycerin    [COMPLETED] Insert Peripheral IV **AND** sodium chloride    sodium chloride    sodium chloride      VITAL SIGNS  Vitals:    06/27/25 0717 06/27/25 0816 06/27/25 0905 06/27/25 1110   BP: 117/77 117/74 120/77 145/92   BP Location:   Left arm Left arm   Patient Position:   Lying Lying   Pulse: 58 50 59 51   Resp:   16 15   Temp:   98.1 °F (36.7 °C) 97.9 °F (36.6 °C)   TempSrc:   Oral Oral   SpO2: 95% 91% 97% 95%   Weight:   127 kg (279 lb 15.8 oz)    Height:   182.9 cm (72.01\")  " "      Flowsheet Rows      Flowsheet Row First Filed Value   Admission Height 182.9 cm (72\") Documented at 06/27/2025 0332   Admission Weight 126 kg (277 lb 12.5 oz) Documented at 06/27/2025 0332             TELEMETRY: sinus rhythm    Physical Exam  VITALS REVIEWED    General:      well developed, in no acute distress.    Head:      normocephalic and atraumatic.    Eyes:      PERRL/EOM intact, conjunctiva and sclera clear with out nystagmus.    Neck:      no masses, thyromegaly,  trachea central with normal respiratory effort and PMI displaced laterally  Lungs:      Clear to auscultation bilaterally  Heart:       Regular rhythm and rate  Msk:      no deformity or scoliosis noted of thoracic or lumbar spine.    Pulses:      pulses normal in all 4 extremities.    Extremities:       No lower extremity edema    Neurologic:      no focal deficits.   alert oriented x3  Skin:      intact without lesions or rashes.    Psych:      alert and cooperative; normal mood and affect; normal attention span and concentration.        LAB RESULTS (LAST 7 DAYS)    CMP   Results from last 7 days   Lab Units 06/27/25  0325   SODIUM mmol/L 139   POTASSIUM mmol/L 4.1   CHLORIDE mmol/L 106   CO2 mmol/L 22.0   BUN mg/dL 11.8   CREATININE mg/dL 0.98   GLUCOSE mg/dL 84   ALBUMIN g/dL 4.3   BILIRUBIN mg/dL 0.4   ALK PHOS U/L 127*   AST (SGOT) U/L 19   ALT (SGPT) U/L 18       BMP  Results from last 7 days   Lab Units 06/27/25  0325   SODIUM mmol/L 139   POTASSIUM mmol/L 4.1   CHLORIDE mmol/L 106   CO2 mmol/L 22.0   BUN mg/dL 11.8   CREATININE mg/dL 0.98   GLUCOSE mg/dL 84       CBC  Results from last 7 days   Lab Units 06/27/25  0325   WBC 10*3/mm3 8.90   RBC 10*6/mm3 4.92   HEMOGLOBIN g/dL 14.4   HEMATOCRIT % 42.6   MCV fL 86.6   PLATELETS 10*3/mm3 234       ProBNP        TROPONIN  Results from last 7 days   Lab Units 06/27/25  0450   HSTROP T ng/L 9       Creatinine Clearance  Estimated Creatinine Clearance: 138 mL/min (by C-G formula based on " SCr of 0.98 mg/dL).      Radiology  XR Chest 1 View  Result Date: 6/27/2025  Cardiomegaly without evidence of failure or pneumonia. Electronically Signed: Mahamed Trinidad MD  6/27/2025 4:49 AM EDT  Workstation ID: [id number]      EKG    I personally viewed and interpreted the patient's EKG/Telemetry data:  No orders to display       ECHOCARDIOGRAM:      STRESS MYOVIEW:      CARDIAC CATHETERIZATION:  No results found for this or any previous visit.      OTHER:         Assessment & Plan       Chest pain      PLAN    Chest pain  -scheduled for heart cath on 7/3/25  -NTG ordered PRN  -workup negative  -ok for discharge    Patient left AMA.     I discussed the patients findings and my recommendations with patient and nurse.      ISIDRO Mendez  06/27/25  11:32 EDT  Electronically signed by ISIDRO Mendez, 06/27/25, 1:08 PM EDT.

## 2025-06-27 NOTE — DISCHARGE SUMMARY
[facility]    Kwadwo Tovar MD    CHIEF COMPLAINT:     Chest pain    HISTORY OF PRESENT ILLNESS:    Chest Pain         ED  39-year-old male with a history of heart disease and stent placement in the  maker he states who states over the last 10 days he has been having some tightness in the chest and left arm discomfort and dizziness. Patient states it happens mostly in the evening. He has seen his cardiologist on the 18th and is scheduled for heart cath on July 3. He states that he seems to be getting worse so he came to the ER this morning no current discomfort. Nothing really seem to trigger or make it better or worse. No recent illness flus viruses vaccinations foreign travels leg pain or swelling.     Past Medical History:   Diagnosis Date    Coronary artery disease     Hyperlipidemia     Hypertension     Myocardial infarction 09/15/2017     Past Surgical History:   Procedure Laterality Date    CORONARY STENT PLACEMENT  09/2017    NO PAST SURGERIES       Family History   Problem Relation Age of Onset    Heart disease Mother     Heart disease Father     Heart failure Father     No Known Problems Sister     Heart disease Brother     Heart failure Brother     Heart attack Brother     No Known Problems Maternal Aunt     No Known Problems Maternal Uncle     No Known Problems Paternal Aunt     No Known Problems Paternal Uncle     No Known Problems Maternal Grandmother     Heart disease Maternal Grandfather     Heart failure Maternal Grandfather     No Known Problems Paternal Grandmother     No Known Problems Paternal Grandfather     No Known Problems Other     Anemia Neg Hx     Arrhythmia Neg Hx     Asthma Neg Hx     Clotting disorder Neg Hx     Fainting Neg Hx     Hyperlipidemia Neg Hx     Hypertension Neg Hx      Social History     Tobacco Use    Smoking status: Every Day     Current packs/day: 0.50     Average packs/day: 0.5 packs/day for 15.0 years (7.5 ttl pk-yrs)     Types:  Cigarettes     Passive exposure: Current    Smokeless tobacco: Never   Vaping Use    Vaping status: Never Used   Substance Use Topics    Alcohol use: No    Drug use: Yes     Types: Marijuana     Medications Prior to Admission   Medication Sig Dispense Refill Last Dose/Taking    atorvastatin (LIPITOR) 40 MG tablet Take 1 tablet by mouth Every Night.   6/26/2025 Evening    carvedilol (COREG) 3.125 MG tablet Take 1 tablet by mouth Daily.   6/26/2025    clopidogrel (PLAVIX) 75 MG tablet Take 1 tablet by mouth Daily. 30 days only needs an appointment for more refills. 30 tablet 1 6/26/2025    doxazosin (CARDURA) 2 MG tablet Take 1 tablet by mouth every night at bedtime.   6/26/2025 Evening    gabapentin (NEURONTIN) 400 MG capsule Take 1 capsule by mouth 3 times a day.   6/26/2025 Evening    LORazepam (ATIVAN) 0.5 MG tablet Take 1 tablet by mouth 2 (Two) Times a Day.  0 6/26/2025 Evening    losartan (COZAAR) 100 MG tablet Take 1 tablet by mouth Daily.  0 6/26/2025    pantoprazole (PROTONIX) 40 MG EC tablet Take 1 tablet by mouth Daily.   6/26/2025    traMADol (ULTRAM) 50 MG tablet Take 2 tablets by mouth 2 (Two) Times a Day.  0 6/26/2025 Evening     Allergies:  Metoprolol    Immunization History   Administered Date(s) Administered    COVID-19 (Boxed) 08/28/2021    Hep B, Dialysis 08/11/1998, 09/29/1998, 02/16/1999    MMR 08/06/1996    PPD Test 07/28/2015    Td (TDVAX) 08/08/1995, 09/22/1998           REVIEW OF SYSTEMS:    Review of Systems   Cardiovascular:  Positive for chest pain.           Vital Signs  Temp:  [97.9 °F (36.6 °C)-98.2 °F (36.8 °C)] 97.9 °F (36.6 °C)  Heart Rate:  [50-72] 51  Resp:  [15-18] 15  BP: (116-159)/(74-92) 145/92          Physical Exam:  Physical Exam  Constitutional:       Appearance: Normal appearance. He is obese.   Cardiovascular:      Rate and Rhythm: Normal rate and regular rhythm.   Neurological:      General: No focal deficit present.      Mental Status: He is alert and oriented to  person, place, and time. Mental status is at baseline.   Psychiatric:         Mood and Affect: Mood normal.         Behavior: Behavior normal.         Emotional Behavior:    wnl   Debilities:   None      Results Review:    I reviewed the patient's new clinical results.  Lab Results (most recent)       Procedure Component Value Units Date/Time    High Sensitivity Troponin T 1Hr [[id number]]  (Normal) Collected: 06/27/25 0450    Specimen: Blood Updated: 06/27/25 0522     HS Troponin T 9 ng/L      Troponin T Numeric Delta 1 ng/L     Narrative:      High Sensitive Troponin T Reference Range:  <14.0 ng/L- Negative Female for AMI  <22.0 ng/L- Negative Male for AMI  >=14 - Abnormal Female indicating possible myocardial injury.  >=22 - Abnormal Male indicating possible myocardial injury.   Clinicians would have to utilize clinical acumen, EKG, Troponin, and serial changes to determine if it is an Acute Myocardial Infarction or myocardial injury due to an underlying chronic condition.         Comprehensive Metabolic Panel [[id number]]  (Abnormal) Collected: 06/27/25 0325    Specimen: Blood Updated: 06/27/25 0432     Glucose 84 mg/dL      BUN 11.8 mg/dL      Creatinine 0.98 mg/dL      Sodium 139 mmol/L      Potassium 4.1 mmol/L      Chloride 106 mmol/L      CO2 22.0 mmol/L      Calcium 9.1 mg/dL      Total Protein 6.6 g/dL      Albumin 4.3 g/dL      ALT (SGPT) 18 U/L      AST (SGOT) 19 U/L      Alkaline Phosphatase 127 U/L      Total Bilirubin 0.4 mg/dL      Globulin 2.3 gm/dL      A/G Ratio 1.9 g/dL      BUN/Creatinine Ratio 12.0     Anion Gap 11.0 mmol/L      eGFR 100.0 mL/min/1.73     Narrative:      GFR Categories in Chronic Kidney Disease (CKD)              GFR Category          GFR (mL/min/1.73)    Interpretation  G1                    90 or greater        Normal or high (1)  G2                    60-89                Mild decrease (1)  G3a                   45-59                Mild to moderate decrease  G3b                    30-44                Moderate to severe decrease  G4                    15-29                Severe decrease  G5                    14 or less           Kidney failure    (1)In the absence of evidence of kidney disease, neither GFR category G1 or G2 fulfill the criteria for CKD.    eGFR calculation 2021 CKD-EPI creatinine equation, which does not include race as a factor    High Sensitivity Troponin T [[id number]]  (Normal) Collected: 06/27/25 0325    Specimen: Blood Updated: 06/27/25 0432     HS Troponin T 8 ng/L     Narrative:      High Sensitive Troponin T Reference Range:  <14.0 ng/L- Negative Female for AMI  <22.0 ng/L- Negative Male for AMI  >=14 - Abnormal Female indicating possible myocardial injury.  >=22 - Abnormal Male indicating possible myocardial injury.   Clinicians would have to utilize clinical acumen, EKG, Troponin, and serial changes to determine if it is an Acute Myocardial Infarction or myocardial injury due to an underlying chronic condition.         CBC & Differential [[id number]]  (Normal) Collected: 06/27/25 0325    Specimen: Blood Updated: 06/27/25 0411    Narrative:      The following orders were created for panel order CBC & Differential.  Procedure                               Abnormality         Status                     ---------                               -----------         ------                     CBC Auto Differential[[id number]]        Normal              Final result                 Please view results for these tests on the individual orders.    CBC Auto Differential [[id number]]  (Normal) Collected: 06/27/25 0325    Specimen: Blood Updated: 06/27/25 0411     WBC 8.90 10*3/mm3      RBC 4.92 10*6/mm3      Hemoglobin 14.4 g/dL      Hematocrit 42.6 %      MCV 86.6 fL      MCH 29.3 pg      MCHC 33.8 g/dL      RDW 12.8 %      RDW-SD 39.9 fl      MPV 10.2 fL      Platelets 234 10*3/mm3      Neutrophil % 59.4 %      Lymphocyte % 29.2 %      Monocyte % 8.7 %      Eosinophil %  1.8 %      Basophil % 0.7 %      Immature Grans % 0.2 %      Neutrophils, Absolute 5.29 10*3/mm3      Lymphocytes, Absolute 2.60 10*3/mm3      Monocytes, Absolute 0.77 10*3/mm3      Eosinophils, Absolute 0.16 10*3/mm3      Basophils, Absolute 0.06 10*3/mm3      Immature Grans, Absolute 0.02 10*3/mm3      nRBC 0.0 /100 WBC             Imaging Results (Most Recent)       Procedure Component Value Units Date/Time    XR Chest 1 View [[id number]] Collected: 06/27/25 0448     Updated: 06/27/25 0451    Narrative:      XR CHEST 1 VW    Date of Exam: 6/27/2025 3:05 AM EDT    Indication: Chest Pain    Comparison: 9/17/2017    Findings:  Heart remains enlarged. Pulmonary vascularity is normal. The lungs are clear except for a left upper lobe calcified granuloma. No pneumothorax. There are granulomatous calcifications in the left hilum.      Impression:      Cardiomegaly without evidence of failure or pneumonia.        Electronically Signed: Mahamed Trinidad MD    6/27/2025 4:49 AM EDT    Workstation ID: [id number]          reviewed    ECG/EMG Results (most recent)       None          reviewed        No results found for this or any previous visit.      Microbiology Results (last 10 days)       ** No results found for the last 240 hours. **            Assessment & Plan     Chest pain     Chest pain  Lab Results   Component Value Date    TROPONINT 9 06/27/2025    TROPONINT 8 06/27/2025     -cbc cmp unremarkable  -Chest X-ray:Cardiomegaly without evidence of failure or pneumonia.   -cardiology consulted and recommends cath on 7/3 as previously scheduled  - pt instructed to use nitro for cp  -Telemetry      I discussed the patients findings and my recommendations with patient and nursing staff.     Discharge Diagnosis:      Chest pain      Hospital Course  Patient is a 39 y.o. male presented with chest pain. Er evaluated and admitted to observation. Labs including cbc, troponin and cmp are normal.  Chest xray showing cardiomegaly.  Cardiology consulted and recommends keeping cath on 7/3 and using nitro for chest pain. Pt states he was unhappy with information given and then left AMA before I could discharge pt.     Past Medical History:     Past Medical History:   Diagnosis Date    Coronary artery disease     Hyperlipidemia     Hypertension     Myocardial infarction 09/15/2017       Past Surgical History:     Past Surgical History:   Procedure Laterality Date    CORONARY STENT PLACEMENT  09/2017    NO PAST SURGERIES         Social History:   Social History     Socioeconomic History    Marital status:    Tobacco Use    Smoking status: Every Day     Current packs/day: 0.50     Average packs/day: 0.5 packs/day for 15.0 years (7.5 ttl pk-yrs)     Types: Cigarettes     Passive exposure: Current    Smokeless tobacco: Never   Vaping Use    Vaping status: Never Used   Substance and Sexual Activity    Alcohol use: No    Drug use: Yes     Types: Marijuana    Sexual activity: Not Currently     Partners: Female     Birth control/protection: Condom, Abstinence       Procedures Performed         Consults:   Consults       Date and Time Order Name Status Description    6/27/2025  7:23 AM Inpatient Cardiology Consult              Condition on Discharge:     Stable    Discharge Disposition      Discharge Medications     Discharge Medications        New Medications        Instructions Start Date   nitroglycerin 0.4 MG SL tablet  Commonly known as: NITROSTAT   1 under the tongue as needed for angina, may repeat q5mins for up three doses             ASK your doctor about these medications        Instructions Start Date   atorvastatin 40 MG tablet  Commonly known as: LIPITOR  Ask about: Which instructions should I use?   40 mg, Nightly      carvedilol 3.125 MG tablet  Commonly known as: COREG  Ask about: Which instructions should I use?   3.125 mg, Daily      clopidogrel 75 MG tablet  Commonly known as: PLAVIX   75 mg, Oral, Daily, 30 days only needs an  appointment for more refills.      doxazosin 2 MG tablet  Commonly known as: CARDURA   2 mg, Every Night at Bedtime      gabapentin 400 MG capsule  Commonly known as: NEURONTIN   1 capsule, 3 times daily      LORazepam 0.5 MG tablet  Commonly known as: ATIVAN   0.5 mg, 2 Times Daily      losartan 100 MG tablet  Commonly known as: COZAAR   100 mg, Daily      pantoprazole 40 MG EC tablet  Commonly known as: PROTONIX   40 mg, Daily      traMADol 50 MG tablet  Commonly known as: ULTRAM   2 tablets, 2 Times Daily               Discharge Diet:     Activity at Discharge:     Follow-up Appointments  No future appointments.      Test Results Pending at Discharge  Pending Results       Procedure [Order ID] Specimen - Date/Time    Basic Metabolic Panel [[id number]]     Specimen: Blood     CBC (No Diff) [[id number]]     Specimen: Blood              Risk for Readmission (LACE) Score: 2 (6/27/2025  6:00 AM)      Less Than 30 minutes spent in discharge activities for this patient    Signature:Electronically signed by Betty Sanchez PA-C, 06/27/25, 12:47 PM EDT.

## 2025-06-27 NOTE — CASE MANAGEMENT/SOCIAL WORK
Case Management Discharge Note      Final Note: Routine home                    Transportation Services  Transportation: Private Transportation  Private: Car    Final Discharge Disposition Code: 01 - home or self-care

## 2025-07-03 ENCOUNTER — HOSPITAL ENCOUNTER (OUTPATIENT)
Facility: HOSPITAL | Age: 40
Setting detail: HOSPITAL OUTPATIENT SURGERY
Discharge: HOME | End: 2025-07-03
Attending: INTERNAL MEDICINE | Admitting: INTERNAL MEDICINE
Payer: COMMERCIAL

## 2025-07-03 ENCOUNTER — APPOINTMENT (OUTPATIENT)
Dept: CARDIOLOGY | Facility: HOSPITAL | Age: 40
End: 2025-07-03
Payer: COMMERCIAL

## 2025-07-03 ENCOUNTER — LAB (OUTPATIENT)
Dept: LAB | Facility: HOSPITAL | Age: 40
End: 2025-07-03
Payer: COMMERCIAL

## 2025-07-03 VITALS
RESPIRATION RATE: 18 BRPM | TEMPERATURE: 99.2 F | HEIGHT: 72 IN | SYSTOLIC BLOOD PRESSURE: 117 MMHG | DIASTOLIC BLOOD PRESSURE: 66 MMHG | OXYGEN SATURATION: 96 % | BODY MASS INDEX: 37.11 KG/M2 | HEART RATE: 72 BPM | WEIGHT: 274 LBS

## 2025-07-03 DIAGNOSIS — I25.10 CORONARY ARTERY DISEASE INVOLVING NATIVE CORONARY ARTERY OF NATIVE HEART WITHOUT ANGINA PECTORIS: ICD-10-CM

## 2025-07-03 DIAGNOSIS — E78.2 MIXED HYPERLIPIDEMIA: ICD-10-CM

## 2025-07-03 DIAGNOSIS — I20.0 UNSTABLE ANGINA: ICD-10-CM

## 2025-07-03 LAB
ANION GAP SERPL CALCULATED.3IONS-SCNC: 10.5 MMOL/L (ref 5–15)
AORTIC DIMENSIONLESS INDEX: 0.75 (DI)
AV MEAN PRESS GRAD SYS DOP V1V2: 6 MMHG
AV VMAX SYS DOP: 157 CM/SEC
BH CV ECHO MEAS - ACS: 1.7 CM
BH CV ECHO MEAS - AO MAX PG: 9.9 MMHG
BH CV ECHO MEAS - AO V2 VTI: 39.8 CM
BH CV ECHO MEAS - AVA(I,D): 2.34 CM2
BH CV ECHO MEAS - EDV(CUBED): 148.9 ML
BH CV ECHO MEAS - EDV(MOD-SP4): 80 ML
BH CV ECHO MEAS - EF(MOD-SP4): 39.4 %
BH CV ECHO MEAS - ESV(CUBED): 35.9 ML
BH CV ECHO MEAS - ESV(MOD-SP4): 48.5 ML
BH CV ECHO MEAS - FS: 37.7 %
BH CV ECHO MEAS - IVS/LVPW: 0.9 CM
BH CV ECHO MEAS - IVSD: 0.9 CM
BH CV ECHO MEAS - LA DIMENSION: 4 CM
BH CV ECHO MEAS - LAT PEAK E' VEL: 12.4 CM/SEC
BH CV ECHO MEAS - LV MASS(C)D: 187.3 GRAMS
BH CV ECHO MEAS - LV MAX PG: 7.5 MMHG
BH CV ECHO MEAS - LV MEAN PG: 4 MMHG
BH CV ECHO MEAS - LV V1 MAX: 137 CM/SEC
BH CV ECHO MEAS - LV V1 VTI: 29.7 CM
BH CV ECHO MEAS - LVIDD: 5.3 CM
BH CV ECHO MEAS - LVIDS: 3.3 CM
BH CV ECHO MEAS - LVOT AREA: 3.1 CM2
BH CV ECHO MEAS - LVOT DIAM: 2 CM
BH CV ECHO MEAS - LVPWD: 1 CM
BH CV ECHO MEAS - MED PEAK E' VEL: 12.5 CM/SEC
BH CV ECHO MEAS - MV A MAX VEL: 82.8 CM/SEC
BH CV ECHO MEAS - MV DEC SLOPE: 427.5 CM/SEC2
BH CV ECHO MEAS - MV DEC TIME: 0.27 SEC
BH CV ECHO MEAS - MV E MAX VEL: 104 CM/SEC
BH CV ECHO MEAS - MV E/A: 1.26
BH CV ECHO MEAS - MV MAX PG: 4.9 MMHG
BH CV ECHO MEAS - MV MEAN PG: 2 MMHG
BH CV ECHO MEAS - MV P1/2T: 80.8 MSEC
BH CV ECHO MEAS - MV V2 VTI: 30.9 CM
BH CV ECHO MEAS - MVA(P1/2T): 2.7 CM2
BH CV ECHO MEAS - MVA(VTI): 3 CM2
BH CV ECHO MEAS - PA V2 MAX: 107.6 CM/SEC
BH CV ECHO MEAS - PULM A REVS DUR: 0.09 SEC
BH CV ECHO MEAS - PULM A REVS VEL: 25.1 CM/SEC
BH CV ECHO MEAS - PULM DIAS VEL: 48.2 CM/SEC
BH CV ECHO MEAS - PULM S/D: 0.99
BH CV ECHO MEAS - PULM SYS VEL: 47.6 CM/SEC
BH CV ECHO MEAS - QP/QS: 0.67
BH CV ECHO MEAS - RV MAX PG: 3.1 MMHG
BH CV ECHO MEAS - RV V1 MAX: 88.6 CM/SEC
BH CV ECHO MEAS - RV V1 VTI: 24.6 CM
BH CV ECHO MEAS - RVDD: 2.7 CM
BH CV ECHO MEAS - RVOT DIAM: 1.8 CM
BH CV ECHO MEAS - SV(LVOT): 93.3 ML
BH CV ECHO MEAS - SV(MOD-SP4): 31.5 ML
BH CV ECHO MEAS - SV(RVOT): 62.6 ML
BH CV ECHO MEAS - TAPSE (>1.6): 4.8 CM
BH CV ECHO MEASUREMENTS AVERAGE E/E' RATIO: 8.35
BH CV XLRA - TDI S': 11.1 CM/SEC
BH CV XLRA MEAS - DIST GSV CALF DIST LEFT: 0.35 CM
BH CV XLRA MEAS - DIST GSV CALF DIST RIGHT: 0.22 CM
BH CV XLRA MEAS - DIST GSV THIGH DIST LEFT: 0.42 CM
BH CV XLRA MEAS - DIST GSV THIGH DIST RIGHT: 0.46 CM
BH CV XLRA MEAS - MID GSV CALF LEFT: 0.34 CM
BH CV XLRA MEAS - MID GSV CALF RIGHT: 0.23 CM
BH CV XLRA MEAS - MID GSV THIGH  LEFT: 0.46 CM
BH CV XLRA MEAS - MID GSV THIGH  RIGHT: 0.51 CM
BH CV XLRA MEAS - PROX GSV CALF DIST LEFT: 0.33 CM
BH CV XLRA MEAS - PROX GSV CALF DIST RIGHT: 0.32 CM
BH CV XLRA MEAS - PROX GSV THIGH  LEFT: 0.52 CM
BH CV XLRA MEAS - PROX GSV THIGH  RIGHT: 0.58 CM
BH CV XLRA MEAS LEFT DIST CCA EDV: -23.5 CM/SEC
BH CV XLRA MEAS LEFT DIST CCA PSV: -82.3 CM/SEC
BH CV XLRA MEAS LEFT DIST ICA EDV: -27.4 CM/SEC
BH CV XLRA MEAS LEFT DIST ICA PSV: -68.2 CM/SEC
BH CV XLRA MEAS LEFT ICA/CCA RATIO: -0.58
BH CV XLRA MEAS LEFT PROX CCA EDV: 27.1 CM/SEC
BH CV XLRA MEAS LEFT PROX CCA PSV: 137 CM/SEC
BH CV XLRA MEAS LEFT PROX ECA PSV: -103 CM/SEC
BH CV XLRA MEAS LEFT PROX ICA EDV: -30.6 CM/SEC
BH CV XLRA MEAS LEFT PROX ICA PSV: -79.2 CM/SEC
BH CV XLRA MEAS LEFT PROX SCLA PSV: 201 CM/SEC
BH CV XLRA MEAS LEFT VERTEBRAL A EDV: -14.9 CM/SEC
BH CV XLRA MEAS LEFT VERTEBRAL A PSV: -44.7 CM/SEC
BH CV XLRA MEAS RIGHT DIST CCA EDV: -23.8 CM/SEC
BH CV XLRA MEAS RIGHT DIST CCA PSV: -80.6 CM/SEC
BH CV XLRA MEAS RIGHT DIST ICA EDV: -42 CM/SEC
BH CV XLRA MEAS RIGHT DIST ICA PSV: -104 CM/SEC
BH CV XLRA MEAS RIGHT ICA/CCA RATIO: -0.76
BH CV XLRA MEAS RIGHT PROX CCA EDV: 14.7 CM/SEC
BH CV XLRA MEAS RIGHT PROX CCA PSV: 136 CM/SEC
BH CV XLRA MEAS RIGHT PROX ECA PSV: -111 CM/SEC
BH CV XLRA MEAS RIGHT PROX ICA EDV: -18.9 CM/SEC
BH CV XLRA MEAS RIGHT PROX ICA PSV: -63.8 CM/SEC
BH CV XLRA MEAS RIGHT PROX SCLA PSV: 184 CM/SEC
BH CV XLRA MEAS RIGHT VERTEBRAL A EDV: -17.5 CM/SEC
BH CV XLRA MEAS RIGHT VERTEBRAL A PSV: -46.2 CM/SEC
BUN SERPL-MCNC: 12.3 MG/DL (ref 6–20)
BUN/CREAT SERPL: 13.2 (ref 7–25)
CALCIUM SPEC-SCNC: 9.2 MG/DL (ref 8.6–10.5)
CHLORIDE SERPL-SCNC: 105 MMOL/L (ref 98–107)
CHOLEST SERPL-MCNC: 118 MG/DL (ref 0–200)
CO2 SERPL-SCNC: 23.5 MMOL/L (ref 22–29)
CREAT SERPL-MCNC: 0.93 MG/DL (ref 0.76–1.27)
DEPRECATED RDW RBC AUTO: 39.8 FL (ref 37–54)
EGFRCR SERPLBLD CKD-EPI 2021: 106.5 ML/MIN/1.73
ERYTHROCYTE [DISTWIDTH] IN BLOOD BY AUTOMATED COUNT: 12.8 % (ref 12.3–15.4)
GLUCOSE SERPL-MCNC: 91 MG/DL (ref 65–99)
HCT VFR BLD AUTO: 44.8 % (ref 37.5–51)
HDLC SERPL-MCNC: 32 MG/DL (ref 40–60)
HGB BLD-MCNC: 15.3 G/DL (ref 13–17.7)
LDLC SERPL CALC-MCNC: 64 MG/DL (ref 0–100)
LDLC/HDLC SERPL: 1.93 {RATIO}
MCH RBC QN AUTO: 29.4 PG (ref 26.6–33)
MCHC RBC AUTO-ENTMCNC: 34.2 G/DL (ref 31.5–35.7)
MCV RBC AUTO: 86.2 FL (ref 79–97)
PLATELET # BLD AUTO: 240 10*3/MM3 (ref 140–450)
PMV BLD AUTO: 9.9 FL (ref 6–12)
POTASSIUM SERPL-SCNC: 4.2 MMOL/L (ref 3.5–5.2)
RBC # BLD AUTO: 5.2 10*6/MM3 (ref 4.14–5.8)
SINUS: 3.2 CM
SODIUM SERPL-SCNC: 139 MMOL/L (ref 136–145)
TRIGL SERPL-MCNC: 121 MG/DL (ref 0–150)
VLDLC SERPL-MCNC: 22 MG/DL (ref 5–40)
WBC NRBC COR # BLD AUTO: 10.11 10*3/MM3 (ref 3.4–10.8)

## 2025-07-03 PROCEDURE — 25010000002 HEPARIN (PORCINE) PER 1000 UNITS: Performed by: INTERNAL MEDICINE

## 2025-07-03 PROCEDURE — 25010000002 FENTANYL CITRATE (PF) 100 MCG/2ML SOLUTION: Performed by: INTERNAL MEDICINE

## 2025-07-03 PROCEDURE — 93970 EXTREMITY STUDY: CPT | Performed by: SURGERY

## 2025-07-03 PROCEDURE — 25010000002 LIDOCAINE 1 % SOLUTION: Performed by: INTERNAL MEDICINE

## 2025-07-03 PROCEDURE — 25810000003 SODIUM CHLORIDE 0.9 % SOLUTION: Performed by: INTERNAL MEDICINE

## 2025-07-03 PROCEDURE — 93306 TTE W/DOPPLER COMPLETE: CPT

## 2025-07-03 PROCEDURE — 99152 MOD SED SAME PHYS/QHP 5/>YRS: CPT | Performed by: INTERNAL MEDICINE

## 2025-07-03 PROCEDURE — 25010000002 NICARDIPINE 2.5 MG/ML SOLUTION: Performed by: INTERNAL MEDICINE

## 2025-07-03 PROCEDURE — 80048 BASIC METABOLIC PNL TOTAL CA: CPT | Performed by: INTERNAL MEDICINE

## 2025-07-03 PROCEDURE — 80061 LIPID PANEL: CPT | Performed by: INTERNAL MEDICINE

## 2025-07-03 PROCEDURE — 93306 TTE W/DOPPLER COMPLETE: CPT | Performed by: INTERNAL MEDICINE

## 2025-07-03 PROCEDURE — 93880 EXTRACRANIAL BILAT STUDY: CPT | Performed by: SURGERY

## 2025-07-03 PROCEDURE — 25010000002 NITROGLYCERIN 5 MG/ML SOLUTION: Performed by: INTERNAL MEDICINE

## 2025-07-03 PROCEDURE — 93458 L HRT ARTERY/VENTRICLE ANGIO: CPT | Performed by: INTERNAL MEDICINE

## 2025-07-03 PROCEDURE — 85027 COMPLETE CBC AUTOMATED: CPT | Performed by: INTERNAL MEDICINE

## 2025-07-03 PROCEDURE — 25010000002 SULFUR HEXAFLUORIDE MICROSPH 60.7-25 MG RECONSTITUTED SUSPENSION: Performed by: INTERNAL MEDICINE

## 2025-07-03 PROCEDURE — C1894 INTRO/SHEATH, NON-LASER: HCPCS | Performed by: INTERNAL MEDICINE

## 2025-07-03 PROCEDURE — 25010000002 MIDAZOLAM PER 1 MG: Performed by: INTERNAL MEDICINE

## 2025-07-03 PROCEDURE — 93970 EXTREMITY STUDY: CPT

## 2025-07-03 PROCEDURE — 93880 EXTRACRANIAL BILAT STUDY: CPT

## 2025-07-03 PROCEDURE — C1769 GUIDE WIRE: HCPCS | Performed by: INTERNAL MEDICINE

## 2025-07-03 PROCEDURE — 25510000001 IOPAMIDOL PER 1 ML: Performed by: INTERNAL MEDICINE

## 2025-07-03 RX ORDER — NITROGLYCERIN 5 MG/ML
INJECTION, SOLUTION INTRAVENOUS
Status: DISCONTINUED | OUTPATIENT
Start: 2025-07-03 | End: 2025-07-03 | Stop reason: HOSPADM

## 2025-07-03 RX ORDER — NICARDIPINE HYDROCHLORIDE 2.5 MG/ML
INJECTION INTRAVENOUS
Status: DISCONTINUED | OUTPATIENT
Start: 2025-07-03 | End: 2025-07-03 | Stop reason: HOSPADM

## 2025-07-03 RX ORDER — ONDANSETRON 2 MG/ML
4 INJECTION INTRAMUSCULAR; INTRAVENOUS EVERY 6 HOURS PRN
Status: DISCONTINUED | OUTPATIENT
Start: 2025-07-03 | End: 2025-07-03 | Stop reason: HOSPADM

## 2025-07-03 RX ORDER — LIDOCAINE HYDROCHLORIDE 10 MG/ML
INJECTION, SOLUTION INFILTRATION; PERINEURAL
Status: DISCONTINUED | OUTPATIENT
Start: 2025-07-03 | End: 2025-07-03 | Stop reason: HOSPADM

## 2025-07-03 RX ORDER — CARVEDILOL 3.12 MG/1
3.12 TABLET ORAL DAILY
COMMUNITY

## 2025-07-03 RX ORDER — HEPARIN SODIUM 1000 [USP'U]/ML
INJECTION, SOLUTION INTRAVENOUS; SUBCUTANEOUS
Status: DISCONTINUED | OUTPATIENT
Start: 2025-07-03 | End: 2025-07-03 | Stop reason: HOSPADM

## 2025-07-03 RX ORDER — SODIUM CHLORIDE 9 MG/ML
INJECTION, SOLUTION INTRAVENOUS
Status: COMPLETED | OUTPATIENT
Start: 2025-07-03 | End: 2025-07-03

## 2025-07-03 RX ORDER — IOPAMIDOL 755 MG/ML
INJECTION, SOLUTION INTRAVASCULAR
Status: DISCONTINUED | OUTPATIENT
Start: 2025-07-03 | End: 2025-07-03 | Stop reason: HOSPADM

## 2025-07-03 RX ORDER — DIPHENHYDRAMINE HCL 25 MG
25 CAPSULE ORAL EVERY 6 HOURS PRN
Status: DISCONTINUED | OUTPATIENT
Start: 2025-07-03 | End: 2025-07-03 | Stop reason: HOSPADM

## 2025-07-03 RX ORDER — ACETAMINOPHEN 325 MG/1
650 TABLET ORAL EVERY 4 HOURS PRN
Status: DISCONTINUED | OUTPATIENT
Start: 2025-07-03 | End: 2025-07-03 | Stop reason: HOSPADM

## 2025-07-03 RX ORDER — FENTANYL CITRATE 50 UG/ML
INJECTION, SOLUTION INTRAMUSCULAR; INTRAVENOUS
Status: DISCONTINUED | OUTPATIENT
Start: 2025-07-03 | End: 2025-07-03 | Stop reason: HOSPADM

## 2025-07-03 RX ORDER — ONDANSETRON 4 MG/1
4 TABLET, ORALLY DISINTEGRATING ORAL EVERY 6 HOURS PRN
Status: DISCONTINUED | OUTPATIENT
Start: 2025-07-03 | End: 2025-07-03 | Stop reason: HOSPADM

## 2025-07-03 RX ORDER — MIDAZOLAM HYDROCHLORIDE 1 MG/ML
INJECTION, SOLUTION INTRAMUSCULAR; INTRAVENOUS
Status: DISCONTINUED | OUTPATIENT
Start: 2025-07-03 | End: 2025-07-03 | Stop reason: HOSPADM

## 2025-07-03 RX ORDER — NITROGLYCERIN 0.4 MG/1
0.4 TABLET SUBLINGUAL
Status: DISCONTINUED | OUTPATIENT
Start: 2025-07-03 | End: 2025-07-03 | Stop reason: HOSPADM

## 2025-07-03 RX ADMIN — SULFUR HEXAFLUORIDE 2 ML: KIT at 13:34

## 2025-07-03 NOTE — PROGRESS NOTES
Pt came in today for an elective heart cath.   Hx STEMI with stenting at age 32.  Now with ISR and recs for evaluation by cardiac surgery.    Carotid duplex, vein mapping, echo ordered.  New patient appt with Dr. Yung is 7/7/2025 at 11 AM

## 2025-07-03 NOTE — Clinical Note
Hemostasis started on the right radial artery. R-Band was used in achieving hemostasis. Radial compression device applied to vessel. Hemostasis achieved successfully. Closure device additional comment: 10cc of air in cuff

## 2025-07-07 ENCOUNTER — OFFICE VISIT (OUTPATIENT)
Dept: CARDIAC SURGERY | Facility: CLINIC | Age: 40
End: 2025-07-07
Payer: COMMERCIAL

## 2025-07-07 VITALS
RESPIRATION RATE: 18 BRPM | HEIGHT: 71 IN | SYSTOLIC BLOOD PRESSURE: 112 MMHG | OXYGEN SATURATION: 96 % | TEMPERATURE: 97.5 F | WEIGHT: 284 LBS | BODY MASS INDEX: 39.76 KG/M2 | HEART RATE: 73 BPM | DIASTOLIC BLOOD PRESSURE: 78 MMHG

## 2025-07-07 DIAGNOSIS — I25.10 CORONARY ARTERY DISEASE INVOLVING NATIVE CORONARY ARTERY OF NATIVE HEART WITHOUT ANGINA PECTORIS: Primary | ICD-10-CM

## 2025-07-07 DIAGNOSIS — I21.02 ST ELEVATION MYOCARDIAL INFARCTION INVOLVING LEFT ANTERIOR DESCENDING (LAD) CORONARY ARTERY: ICD-10-CM

## 2025-07-07 DIAGNOSIS — R07.2 PRECORDIAL PAIN: ICD-10-CM

## 2025-07-07 DIAGNOSIS — E78.2 MIXED HYPERLIPIDEMIA: ICD-10-CM

## 2025-07-07 DIAGNOSIS — I10 PRIMARY HYPERTENSION: ICD-10-CM

## 2025-07-09 ENCOUNTER — TELEPHONE (OUTPATIENT)
Dept: CARDIAC SURGERY | Facility: CLINIC | Age: 40
End: 2025-07-09
Payer: COMMERCIAL

## 2025-07-09 ENCOUNTER — PREP FOR SURGERY (OUTPATIENT)
Dept: OTHER | Facility: HOSPITAL | Age: 40
End: 2025-07-09
Payer: COMMERCIAL

## 2025-07-09 DIAGNOSIS — I25.10 CORONARY ARTERY DISEASE INVOLVING NATIVE CORONARY ARTERY OF NATIVE HEART WITHOUT ANGINA PECTORIS: Primary | ICD-10-CM

## 2025-07-09 RX ORDER — CHLORHEXIDINE GLUCONATE 500 MG/1
CLOTH TOPICAL EVERY 12 HOURS PRN
Status: CANCELLED | OUTPATIENT
Start: 2025-07-09

## 2025-07-09 RX ORDER — SODIUM CHLORIDE 0.9 % (FLUSH) 0.9 %
30 SYRINGE (ML) INJECTION ONCE AS NEEDED
OUTPATIENT
Start: 2025-07-09

## 2025-07-09 RX ORDER — CHLORHEXIDINE GLUCONATE ORAL RINSE 1.2 MG/ML
15 SOLUTION DENTAL ONCE
OUTPATIENT
Start: 2025-07-09 | End: 2025-07-09

## 2025-07-09 RX ORDER — NICOTINE POLACRILEX 4 MG
15 LOZENGE BUCCAL
OUTPATIENT
Start: 2025-07-09

## 2025-07-09 RX ORDER — ACETAMINOPHEN 325 MG/1
650 TABLET ORAL EVERY 4 HOURS PRN
OUTPATIENT
Start: 2025-07-09

## 2025-07-09 RX ORDER — IBUPROFEN 600 MG/1
1 TABLET ORAL
OUTPATIENT
Start: 2025-07-09

## 2025-07-09 RX ORDER — SODIUM CHLORIDE 0.9 % (FLUSH) 0.9 %
3 SYRINGE (ML) INJECTION EVERY 12 HOURS SCHEDULED
OUTPATIENT
Start: 2025-07-09

## 2025-07-09 RX ORDER — SODIUM CHLORIDE 0.9 % (FLUSH) 0.9 %
3-10 SYRINGE (ML) INJECTION AS NEEDED
OUTPATIENT
Start: 2025-07-09

## 2025-07-09 RX ORDER — SODIUM CHLORIDE 9 MG/ML
40 INJECTION, SOLUTION INTRAVENOUS AS NEEDED
OUTPATIENT
Start: 2025-07-09

## 2025-07-09 RX ORDER — CHLORHEXIDINE GLUCONATE 500 MG/1
CLOTH TOPICAL EVERY 12 HOURS PRN
OUTPATIENT
Start: 2025-07-09

## 2025-07-09 RX ORDER — SODIUM CHLORIDE 9 MG/ML
30 INJECTION, SOLUTION INTRAVENOUS CONTINUOUS PRN
OUTPATIENT
Start: 2025-07-09 | End: 2025-07-12

## 2025-07-09 RX ORDER — CARVEDILOL 3.12 MG/1
3.12 TABLET ORAL ONCE
OUTPATIENT
Start: 2025-07-09 | End: 2025-07-09

## 2025-07-09 RX ORDER — ALPRAZOLAM 0.25 MG
0.25 TABLET ORAL ONCE
OUTPATIENT
Start: 2025-07-09 | End: 2025-07-09

## 2025-07-09 RX ORDER — NITROGLYCERIN 0.4 MG/1
0.4 TABLET SUBLINGUAL
OUTPATIENT
Start: 2025-07-09

## 2025-07-09 RX ORDER — CHLORHEXIDINE GLUCONATE ORAL RINSE 1.2 MG/ML
15 SOLUTION DENTAL EVERY 12 HOURS
Status: CANCELLED | OUTPATIENT
Start: 2025-07-09 | End: 2025-07-10

## 2025-07-09 RX ORDER — DEXTROSE MONOHYDRATE 25 G/50ML
10-50 INJECTION, SOLUTION INTRAVENOUS
OUTPATIENT
Start: 2025-07-09

## 2025-07-09 NOTE — TELEPHONE ENCOUNTER
Spoke with pt surgery scheduled for 07/17/2025 arriving at 5am. SUJATA Serra to schedule PAT. CARISA plavix 7/12/2025

## 2025-07-10 NOTE — PROGRESS NOTES
"Chief Complaint  Coronary Artery Disease    Subjective        Clark Randolph presents to [facility] CARDIAC SURGERY  History of Present Illness  40 yo male with strong family history of CAD, personal history of CAD status post PCI to LAD in the setting of anterior wall MI 2017, HTN, HLD, tobacco abuse, presents for evaluation of his coronary artery disease. He has shortness of breath over the last few weeks, similar symptoms he had in 2017 with the anterior wall MI. TTE showed a LVEF of 46%, Hypokinesis of the anterior wall. Cardiac cath showed multiple vessel CAD with in stent restenosis of the LAD stent.      Objective   Vital Signs:  /78 (BP Location: Left arm, Patient Position: Sitting, Cuff Size: Large Adult)   Pulse 73   Temp 97.5 °F (36.4 °C) (Skin)   Resp 18   Ht 180.3 cm (71\")   Wt 129 kg (284 lb)   SpO2 96%   BMI 39.61 kg/m²   Estimated body mass index is 39.61 kg/m² as calculated from the following:    Height as of this encounter: 180.3 cm (71\").    Weight as of this encounter: 129 kg (284 lb).          Physical Exam  Constitutional:       Appearance: Normal appearance. He is normal weight.   Cardiovascular:      Rate and Rhythm: Normal rate and regular rhythm.      Heart sounds: Normal heart sounds.   Pulmonary:      Effort: Pulmonary effort is normal.      Breath sounds: Normal breath sounds.   Neurological:      General: No focal deficit present.      Mental Status: He is alert and oriented to person, place, and time. Mental status is at baseline.   Psychiatric:         Mood and Affect: Mood normal.         Behavior: Behavior normal.         Thought Content: Thought content normal.         Judgment: Judgment normal.        Result Review :                Assessment and Plan   Diagnoses and all orders for this visit:    1. Coronary artery disease involving native coronary artery of native heart without angina pectoris (Primary)    2. Mixed hyperlipidemia    3. Primary " hypertension    4. ST elevation myocardial infarction involving left anterior descending (LAD) coronary artery    5. Precordial pain    38 yo male with strong family history of CAD. He had an MI in 2017 with PCI to the LAD. Now he has symptoms and in stent restenosis of the LAD stent. I think his CAD needs to be treated in order to improve symptoms and prolong life. The best option to treat the CAD is CABG due to in stent restenosis.  I explained risk and benefits of the procedure to the patient and he agree to proceed.          Follow Up   No follow-ups on file.  Patient was given instructions and counseling regarding his condition or for health maintenance advice. Please see specific information pulled into the AVS if appropriate.

## 2025-07-14 ENCOUNTER — ANESTHESIA EVENT (OUTPATIENT)
Dept: PERIOP | Facility: HOSPITAL | Age: 40
End: 2025-07-14
Payer: COMMERCIAL

## 2025-07-14 RX ORDER — CHLORHEXIDINE GLUCONATE ORAL RINSE 1.2 MG/ML
15 SOLUTION DENTAL EVERY 12 HOURS
Status: DISCONTINUED | OUTPATIENT
Start: 2025-07-16 | End: 2025-07-17

## 2025-07-14 RX ORDER — CHLORHEXIDINE GLUCONATE 500 MG/1
CLOTH TOPICAL EVERY 12 HOURS PRN
Status: DISCONTINUED | OUTPATIENT
Start: 2025-07-14 | End: 2025-07-17

## 2025-07-15 ENCOUNTER — PRE-ADMISSION TESTING (OUTPATIENT)
Dept: PREADMISSION TESTING | Facility: HOSPITAL | Age: 40
End: 2025-07-15
Payer: COMMERCIAL

## 2025-07-15 ENCOUNTER — APPOINTMENT (OUTPATIENT)
Dept: RESPIRATORY THERAPY | Facility: HOSPITAL | Age: 40
End: 2025-07-15
Payer: COMMERCIAL

## 2025-07-15 ENCOUNTER — HOSPITAL ENCOUNTER (OUTPATIENT)
Dept: RESPIRATORY THERAPY | Facility: HOSPITAL | Age: 40
Discharge: HOME | End: 2025-07-15
Payer: COMMERCIAL

## 2025-07-15 ENCOUNTER — HOSPITAL ENCOUNTER (OUTPATIENT)
Dept: GENERAL RADIOLOGY | Facility: HOSPITAL | Age: 40
Discharge: HOME | End: 2025-07-15
Payer: COMMERCIAL

## 2025-07-15 ENCOUNTER — HOSPITAL ENCOUNTER (OUTPATIENT)
Dept: CARDIOLOGY | Facility: HOSPITAL | Age: 40
Discharge: HOME | End: 2025-07-15
Payer: COMMERCIAL

## 2025-07-15 ENCOUNTER — LAB (OUTPATIENT)
Dept: LAB | Facility: HOSPITAL | Age: 40
End: 2025-07-15
Payer: COMMERCIAL

## 2025-07-15 VITALS
RESPIRATION RATE: 18 BRPM | OXYGEN SATURATION: 97 % | WEIGHT: 287.2 LBS | HEIGHT: 70 IN | HEART RATE: 71 BPM | TEMPERATURE: 97.3 F | BODY MASS INDEX: 41.12 KG/M2 | DIASTOLIC BLOOD PRESSURE: 88 MMHG | SYSTOLIC BLOOD PRESSURE: 124 MMHG

## 2025-07-15 VITALS — OXYGEN SATURATION: 97 % | BODY MASS INDEX: 41.34 KG/M2 | RESPIRATION RATE: 12 BRPM | HEART RATE: 70 BPM | HEIGHT: 70 IN

## 2025-07-15 DIAGNOSIS — I25.10 CORONARY ARTERY DISEASE INVOLVING NATIVE CORONARY ARTERY OF NATIVE HEART WITHOUT ANGINA PECTORIS: ICD-10-CM

## 2025-07-15 LAB
ABO GROUP BLD: NORMAL
ALBUMIN SERPL-MCNC: 4.2 G/DL (ref 3.5–5.2)
ALBUMIN/GLOB SERPL: 1.8 G/DL
ALP SERPL-CCNC: 122 U/L (ref 39–117)
ALT SERPL W P-5'-P-CCNC: 29 U/L (ref 1–41)
ANION GAP SERPL CALCULATED.3IONS-SCNC: 8.1 MMOL/L (ref 5–15)
APTT PPP: 30.3 SECONDS (ref 22.7–35.4)
ARTERIAL PATENCY WRIST A: POSITIVE
AST SERPL-CCNC: 20 U/L (ref 1–40)
ATMOSPHERIC PRESS: ABNORMAL MM[HG]
BASE EXCESS BLDA CALC-SCNC: 3.6 MMOL/L (ref 0–3)
BASOPHILS # BLD AUTO: 0.09 10*3/MM3 (ref 0–0.2)
BASOPHILS NFR BLD AUTO: 0.9 % (ref 0–1.5)
BDY SITE: ABNORMAL
BILIRUB SERPL-MCNC: 0.4 MG/DL (ref 0–1.2)
BILIRUB UR QL STRIP: NEGATIVE
BLD GP AB SCN SERPL QL: NEGATIVE
BUN SERPL-MCNC: 14.2 MG/DL (ref 6–20)
BUN/CREAT SERPL: 14.5 (ref 7–25)
CALCIUM SPEC-SCNC: 9.4 MG/DL (ref 8.6–10.5)
CHLORIDE SERPL-SCNC: 104 MMOL/L (ref 98–107)
CLARITY UR: CLEAR
CLOSE TME COLL+ADP + EPINEP PNL BLD: 94 % (ref 86–100)
CO2 BLDA-SCNC: 29.6 MMOL/L (ref 22–29)
CO2 SERPL-SCNC: 26.9 MMOL/L (ref 22–29)
COLOR UR: YELLOW
CREAT SERPL-MCNC: 0.98 MG/DL (ref 0.76–1.27)
DEPRECATED RDW RBC AUTO: 40.1 FL (ref 37–54)
EGFRCR SERPLBLD CKD-EPI 2021: 100 ML/MIN/1.73
EOSINOPHIL # BLD AUTO: 0.2 10*3/MM3 (ref 0–0.4)
EOSINOPHIL NFR BLD AUTO: 2 % (ref 0.3–6.2)
ERYTHROCYTE [DISTWIDTH] IN BLOOD BY AUTOMATED COUNT: 12.6 % (ref 12.3–15.4)
GLOBULIN UR ELPH-MCNC: 2.4 GM/DL
GLUCOSE SERPL-MCNC: 111 MG/DL (ref 65–99)
GLUCOSE UR STRIP-MCNC: NEGATIVE MG/DL
HBA1C MFR BLD: 5.3 % (ref 4.8–5.6)
HCO3 BLDA-SCNC: 28.3 MMOL/L (ref 21–28)
HCT VFR BLD AUTO: 44.7 % (ref 37.5–51)
HEMODILUTION: NO
HGB BLD-MCNC: 14.9 G/DL (ref 13–17.7)
HGB UR QL STRIP.AUTO: NEGATIVE
IMM GRANULOCYTES # BLD AUTO: 0.04 10*3/MM3 (ref 0–0.05)
IMM GRANULOCYTES NFR BLD AUTO: 0.4 % (ref 0–0.5)
INHALED O2 CONCENTRATION: 21 %
INR PPP: 0.95 (ref 0.9–1.1)
KETONES UR QL STRIP: ABNORMAL
LEUKOCYTE ESTERASE UR QL STRIP.AUTO: NEGATIVE
LYMPHOCYTES # BLD AUTO: 2.66 10*3/MM3 (ref 0.7–3.1)
LYMPHOCYTES NFR BLD AUTO: 27.3 % (ref 19.6–45.3)
MCH RBC QN AUTO: 29.1 PG (ref 26.6–33)
MCHC RBC AUTO-ENTMCNC: 33.3 G/DL (ref 31.5–35.7)
MCV RBC AUTO: 87.3 FL (ref 79–97)
MODALITY: ABNORMAL
MONOCYTES # BLD AUTO: 0.9 10*3/MM3 (ref 0.1–0.9)
MONOCYTES NFR BLD AUTO: 9.2 % (ref 5–12)
MRSA DNA SPEC QL NAA+PROBE: NORMAL
NEUTROPHILS NFR BLD AUTO: 5.87 10*3/MM3 (ref 1.7–7)
NEUTROPHILS NFR BLD AUTO: 60.2 % (ref 42.7–76)
NITRITE UR QL STRIP: NEGATIVE
NRBC BLD AUTO-RTO: 0 /100 WBC (ref 0–0.2)
PCO2 BLDA: 42.4 MM HG (ref 35–48)
PH BLDA: 7.43 PH UNITS (ref 7.35–7.45)
PH UR STRIP.AUTO: 6.5 [PH] (ref 5–8)
PLATELET # BLD AUTO: 249 10*3/MM3 (ref 140–450)
PMV BLD AUTO: 9.7 FL (ref 6–12)
PO2 BLD: 455 MM[HG] (ref 0–500)
PO2 BLDA: 95.5 MM HG (ref 83–108)
POTASSIUM SERPL-SCNC: 4.2 MMOL/L (ref 3.5–5.2)
PROT SERPL-MCNC: 6.6 G/DL (ref 6–8.5)
PROT UR QL STRIP: NEGATIVE
PROTHROMBIN TIME: 12.6 SECONDS (ref 11.7–14.2)
QT INTERVAL: 402 MS
QTC INTERVAL: 438 MS
RBC # BLD AUTO: 5.12 10*6/MM3 (ref 4.14–5.8)
RH BLD: POSITIVE
SAO2 % BLDCOA: 97.6 % (ref 94–98)
SODIUM SERPL-SCNC: 139 MMOL/L (ref 136–145)
SP GR UR STRIP: 1.02 (ref 1–1.03)
T&S EXPIRATION DATE: NORMAL
UROBILINOGEN UR QL STRIP: ABNORMAL
WBC NRBC COR # BLD AUTO: 9.76 10*3/MM3 (ref 3.4–10.8)

## 2025-07-15 PROCEDURE — 94060 EVALUATION OF WHEEZING: CPT

## 2025-07-15 PROCEDURE — 94727 GAS DIL/WSHOT DETER LNG VOL: CPT

## 2025-07-15 PROCEDURE — 85610 PROTHROMBIN TIME: CPT

## 2025-07-15 PROCEDURE — 94799 UNLISTED PULMONARY SVC/PX: CPT

## 2025-07-15 PROCEDURE — 94729 DIFFUSING CAPACITY: CPT

## 2025-07-15 PROCEDURE — 94664 DEMO&/EVAL PT USE INHALER: CPT

## 2025-07-15 PROCEDURE — 86901 BLOOD TYPING SEROLOGIC RH(D): CPT

## 2025-07-15 PROCEDURE — 36600 WITHDRAWAL OF ARTERIAL BLOOD: CPT

## 2025-07-15 PROCEDURE — 93005 ELECTROCARDIOGRAM TRACING: CPT | Performed by: NURSE PRACTITIONER

## 2025-07-15 PROCEDURE — 85730 THROMBOPLASTIN TIME PARTIAL: CPT

## 2025-07-15 PROCEDURE — 87641 MR-STAPH DNA AMP PROBE: CPT

## 2025-07-15 PROCEDURE — 81003 URINALYSIS AUTO W/O SCOPE: CPT

## 2025-07-15 PROCEDURE — 86850 RBC ANTIBODY SCREEN: CPT

## 2025-07-15 PROCEDURE — 36415 COLL VENOUS BLD VENIPUNCTURE: CPT

## 2025-07-15 PROCEDURE — 83036 HEMOGLOBIN GLYCOSYLATED A1C: CPT

## 2025-07-15 PROCEDURE — 71046 X-RAY EXAM CHEST 2 VIEWS: CPT

## 2025-07-15 PROCEDURE — 82803 BLOOD GASES ANY COMBINATION: CPT

## 2025-07-15 PROCEDURE — 86923 COMPATIBILITY TEST ELECTRIC: CPT

## 2025-07-15 PROCEDURE — 85025 COMPLETE CBC W/AUTO DIFF WBC: CPT

## 2025-07-15 PROCEDURE — 80053 COMPREHEN METABOLIC PANEL: CPT

## 2025-07-15 PROCEDURE — 94640 AIRWAY INHALATION TREATMENT: CPT

## 2025-07-15 PROCEDURE — 86900 BLOOD TYPING SEROLOGIC ABO: CPT

## 2025-07-15 PROCEDURE — 85576 BLOOD PLATELET AGGREGATION: CPT

## 2025-07-15 RX ORDER — ALBUTEROL SULFATE 90 UG/1
2 INHALANT RESPIRATORY (INHALATION) ONCE
Status: COMPLETED | OUTPATIENT
Start: 2025-07-15 | End: 2025-07-15

## 2025-07-15 RX ADMIN — ALBUTEROL SULFATE 2 PUFF: 108 AEROSOL, METERED RESPIRATORY (INHALATION) at 10:07

## 2025-07-15 NOTE — H&P
Patient Care Team:  Kwadwo Tovar MD as PCP - General (Family Medicine)  Leonard Richardson MD as Cardiologist (Cardiology)    Chief complaint: Pre-op surgery    Subjective     History of Present Illness:   Mr. Randolph is a 39 year-old male with PMH of STEMI, CAD s/p PCI, HTN, HLD, tobacco abuse, and a strong family history of premature CAD who has developed worsening shortness of breath and chest tightness over the past few weeks. He was seen in the office by Dr. Richardson on 6/18/25 and it was recommended that he undergo cardiac catheterization. Cardiac catheterization on 7/3/25 revealed severe multivessel CAD with significant in-stent restenosis of the LAD. Transthoracic echocardiogram on 7/3/25 demonstrated EF 46-50% and severe hypokinesis of the mid to distal septal and distal anterior wall and apex. Patient was subsequently referred to Cardiac Surgery for surgical evaluation and was seen in the office by Dr. Yung on 7/7/25. It was recommended that he undergo CABG. Patient presents to Pre-Admission Testing today in preparation for surgery on 7/17/25. He denies recent illness or changes to his medical history since last being seen. His last dose of Plavix was on 7/12/25 and his last dose of Losartan was on 7/14/25.        Review of Systems   Respiratory:  Positive for shortness of breath.    Cardiovascular:  Positive for chest pain.   All other systems reviewed and are negative.       Past Medical History:   Diagnosis Date    Coronary artery disease     Hyperlipidemia     Hypertension     Myocardial infarction 09/15/2017     Past Surgical History:   Procedure Laterality Date    CARDIAC CATHETERIZATION Right 7/3/2025    Procedure: Left Heart Cath with Coronary Angiography;  Surgeon: Leonard Richardson MD;  Location: [facility] CATH INVASIVE LOCATION;  Service: Cardiovascular;  Laterality: Right;    CORONARY STENT PLACEMENT  09/2017    NO PAST SURGERIES       Family History   Problem Relation Age of Onset    Heart  disease Mother     Heart disease Father     Heart failure Father     No Known Problems Sister     Heart disease Brother     Heart failure Brother     Heart attack Brother     No Known Problems Maternal Aunt     No Known Problems Maternal Uncle     No Known Problems Paternal Aunt     No Known Problems Paternal Uncle     No Known Problems Maternal Grandmother     Heart disease Maternal Grandfather     Heart failure Maternal Grandfather     No Known Problems Paternal Grandmother     No Known Problems Paternal Grandfather     No Known Problems Other     Anemia Neg Hx     Arrhythmia Neg Hx     Asthma Neg Hx     Clotting disorder Neg Hx     Fainting Neg Hx     Hyperlipidemia Neg Hx     Hypertension Neg Hx      Social History     Tobacco Use    Smoking status: Every Day     Current packs/day: 0.50     Average packs/day: 0.5 packs/day for 15.0 years (7.5 ttl pk-yrs)     Types: Cigarettes     Passive exposure: Current    Smokeless tobacco: Never   Vaping Use    Vaping status: Never Used   Substance Use Topics    Alcohol use: No    Drug use: Yes     Types: Marijuana     No medications prior to admission.     [START ON 7/16/2025] chlorhexidine, 15 mL, Mouth/Throat, Q12H  [START ON 7/16/2025] mupirocin, , Each Nare, Q12H      Allergies:  Metoprolol    Objective      Vital Signs  Temp:  [97.3 °F (36.3 °C)] 97.3 °F (36.3 °C)  Heart Rate:  [70-71] 71  Resp:  [12-18] 18  BP: (124)/(88) 124/88           Physical Exam  Vitals and nursing note reviewed. Exam conducted with a chaperone present.   Constitutional:       General: He is not in acute distress.     Appearance: Normal appearance. He is obese. He is not ill-appearing, toxic-appearing or diaphoretic.   HENT:      Head: Normocephalic and atraumatic.      Nose: Nose normal.      Mouth/Throat:      Mouth: Mucous membranes are moist.      Pharynx: Oropharynx is clear.   Eyes:      Extraocular Movements: Extraocular movements intact.      Conjunctiva/sclera: Conjunctivae normal.       Pupils: Pupils are equal, round, and reactive to light.   Cardiovascular:      Rate and Rhythm: Normal rate and regular rhythm.      Heart sounds: No murmur heard.  Pulmonary:      Effort: Pulmonary effort is normal. No respiratory distress.      Breath sounds: Normal breath sounds. No wheezing or rales.   Abdominal:      General: Bowel sounds are normal.      Palpations: Abdomen is soft.   Musculoskeletal:         General: Normal range of motion.      Cervical back: Normal range of motion and neck supple.      Right lower leg: No edema.      Left lower leg: No edema.   Skin:     General: Skin is warm and dry.      Comments: Venous stasis changes on lower extremities bilaterally   Neurological:      General: No focal deficit present.      Mental Status: He is alert and oriented to person, place, and time.   Psychiatric:         Mood and Affect: Mood normal.         Behavior: Behavior normal.         Results Review:   Lab Results (last 24 hours)       ** No results found for the last 24 hours. **                  Assessment & Plan    Severe multivessel CAD with in-stent restenosis of the LAD  Hx STEMI with PCI to LAD 2017---last dose Plavix 7/12  Strong family hx of premature CAD  Mildly depressed LV systolic function---EF 46-50% (echo)  HTN---last dose Losartan 7/14  HLD  GERD  Anxiety  Current tobacco abuse---cessation encouraged  Morbid obesity---BMI 41.8      Plan for CABG on 7/17/25 with Dr. Yung. Labs and diagnostics reviewed. Patient instructed to continuing holding his Plavix and Losartan and not to take any medications the morning of surgery. He is concerned that his blood pressure will become really high while being off his Losartan since he is on a significant dose and asked if he could take half of a clonidine tablet that he has leftover from a previous prescription at home if needed. I instructed him to monitor his blood pressure at home (he has a BP cuff) and told him that if his SBP is >150,  then yes, he can take half of his clonidine tablet. All questions were answered and patient states he feels ready for surgery.        FRANKLYN Cardona  07/15/25  18:49 EDT

## 2025-07-16 NOTE — ANESTHESIA PREPROCEDURE EVALUATION
Anesthesia Evaluation     NPO Solid Status: > 8 hours  NPO Liquid Status: > 8 hours           Airway   Mallampati: I  TM distance: >3 FB  Neck ROM: full  No difficulty expected  Dental - normal exam     Pulmonary - normal exam     ROS comment: Mr. Randolph is a 39 year-old male with PMH of STEMI, CAD s/p PCI, HTN, HLD, tobacco abuse, and a strong family history of premature CAD who has developed worsening shortness of breath and chest tightness over the past few weeks. He was seen in the office by Dr. Richardson on 6/18/25 and it was recommended that he undergo cardiac catheterization. Cardiac catheterization on 7/3/25 revealed severe multivessel CAD with significant in-stent restenosis of the LAD. Transthoracic echocardiogram on 7/3/25 demonstrated EF 46-50% and severe hypokinesis of the mid to distal septal and distal anterior wall and apex. Patient was subsequently referred to Cardiac Surgery for surgical evaluation and was seen in the office by Dr. Yung on 7/7/25. It was recommended that he undergo CABG.   Cardiovascular - normal exam    (+) hypertension, past MI , CAD, angina, hyperlipidemia      Neuro/Psych  GI/Hepatic/Renal/Endo      Musculoskeletal     Abdominal  - normal exam    Bowel sounds: normal.   Substance History      OB/GYN          Other        ROS/Med Hx Other: MULTIVESSEL CAD   EF 46-50  NORMAL CAROTIDS  PRIOR MI              Anesthesia Plan    ASA 4     general, Lucille, CVL and PAC     intravenous induction   Postoperative Plan: Expected vent after surgery  Anesthetic plan, risks, benefits, and alternatives have been provided, discussed and informed consent has been obtained with: patient.  Pre-procedure education provided    CODE STATUS:          no

## 2025-07-17 ENCOUNTER — APPOINTMENT (OUTPATIENT)
Dept: GENERAL RADIOLOGY | Facility: HOSPITAL | Age: 40
DRG: 236 | End: 2025-07-17
Payer: COMMERCIAL

## 2025-07-17 ENCOUNTER — HOSPITAL ENCOUNTER (INPATIENT)
Facility: HOSPITAL | Age: 40
LOS: 4 days | Discharge: HOME | DRG: 236 | End: 2025-07-21
Payer: COMMERCIAL

## 2025-07-17 ENCOUNTER — ANESTHESIA EVENT CONVERTED (OUTPATIENT)
Dept: ANESTHESIOLOGY | Facility: HOSPITAL | Age: 40
DRG: 236 | End: 2025-07-17
Payer: COMMERCIAL

## 2025-07-17 ENCOUNTER — ANESTHESIA (OUTPATIENT)
Dept: PERIOP | Facility: HOSPITAL | Age: 40
End: 2025-07-17
Payer: COMMERCIAL

## 2025-07-17 ENCOUNTER — OFFICE VISIT (OUTPATIENT)
Dept: PERIOP | Facility: HOSPITAL | Age: 40
DRG: 236 | End: 2025-07-17
Payer: COMMERCIAL

## 2025-07-17 DIAGNOSIS — I25.10 CORONARY ARTERY DISEASE INVOLVING NATIVE CORONARY ARTERY OF NATIVE HEART WITHOUT ANGINA PECTORIS: ICD-10-CM

## 2025-07-17 DIAGNOSIS — Z95.1 S/P CABG X 4: Primary | ICD-10-CM

## 2025-07-17 LAB
ACT BLD: 124 SECONDS (ref 89–137)
ACT BLD: 135 SECONDS (ref 89–137)
ACT BLD: 383 SECONDS (ref 89–137)
ACT BLD: 383 SECONDS (ref 89–137)
ACT BLD: 458 SECONDS (ref 89–137)
ACT BLD: 504 SECONDS (ref 89–137)
ALBUMIN SERPL-MCNC: 4.7 G/DL (ref 3.5–5.2)
ANION GAP SERPL CALCULATED.3IONS-SCNC: 11.5 MMOL/L (ref 5–15)
APTT PPP: 35.2 SECONDS (ref 22.7–35.4)
ARTERIAL PATENCY WRIST A: ABNORMAL
ARTERIAL PATENCY WRIST A: NORMAL
ATMOSPHERIC PRESS: ABNORMAL MM[HG]
ATMOSPHERIC PRESS: NORMAL MM[HG]
BASE DEFICIT: ABNORMAL
BASE EXCESS BLDA CALC-SCNC: -0.2 MMOL/L (ref 0–3)
BASE EXCESS BLDA CALC-SCNC: -1.4 MMOL/L (ref 0–3)
BASE EXCESS BLDA CALC-SCNC: 0 MMOL/L (ref 0–3)
BASE EXCESS BLDA CALC-SCNC: 0.3 MMOL/L (ref 0–3)
BASE EXCESS BLDA CALC-SCNC: 0.8 MMOL/L (ref 0–3)
BASE EXCESS BLDA CALC-SCNC: 1 MMOL/L (ref 0–3)
BASE EXCESS BLDA CALC-SCNC: 1.4 MMOL/L (ref 0–3)
BASE EXCESS BLDA CALC-SCNC: 1.7 MMOL/L (ref 0–3)
BASE EXCESS BLDA CALC-SCNC: 1.9 MMOL/L (ref 0–3)
BASE EXCESS BLDA CALC-SCNC: 2 MMOL/L (ref 0–3)
BASE EXCESS BLDA CALC-SCNC: 2.1 MMOL/L (ref 0–3)
BASE EXCESS BLDA CALC-SCNC: 2.1 MMOL/L (ref 0–3)
BASE EXCESS BLDA CALC-SCNC: 2.4 MMOL/L (ref 0–3)
BASE EXCESS BLDA CALC-SCNC: <0 MMOL/L (ref 0–3)
BASE EXCESS BLDV CALC-SCNC: 1 MMOL/L (ref 0–3)
BASOPHILS # BLD AUTO: 0.08 10*3/MM3 (ref 0–0.2)
BASOPHILS NFR BLD AUTO: 0.5 % (ref 0–1.5)
BDY SITE: ABNORMAL
BDY SITE: NORMAL
BH BB BLOOD EXPIRATION DATE: NORMAL
BH BB BLOOD EXPIRATION DATE: NORMAL
BH BB BLOOD TYPE BARCODE: 6200
BH BB BLOOD TYPE BARCODE: 6200
BH BB DISPENSE STATUS: NORMAL
BH BB DISPENSE STATUS: NORMAL
BH BB PRODUCT CODE: NORMAL
BH BB PRODUCT CODE: NORMAL
BH BB UNIT NUMBER: NORMAL
BH BB UNIT NUMBER: NORMAL
BUN SERPL-MCNC: 12.2 MG/DL (ref 6–20)
BUN/CREAT SERPL: 11.1 (ref 7–25)
CA-I BLDA-SCNC: 1.07 MMOL/L (ref 1.12–1.32)
CA-I BLDA-SCNC: 1.07 MMOL/L (ref 1.12–1.32)
CA-I BLDA-SCNC: 1.08 MMOL/L (ref 1.12–1.32)
CA-I BLDA-SCNC: 1.08 MMOL/L (ref 1.12–1.32)
CA-I BLDA-SCNC: 1.22 MMOL/L (ref 1.12–1.32)
CA-I BLDA-SCNC: 1.44 MMOL/L (ref 1.12–1.32)
CA-I BLDA-SCNC: 1.86 MMOL/L (ref 1.12–1.32)
CA-I SERPL ISE-MCNC: 1.14 MMOL/L (ref 1.15–1.3)
CALCIUM SPEC-SCNC: 9.4 MG/DL (ref 8.6–10.5)
CHLORIDE SERPL-SCNC: 106 MMOL/L (ref 98–107)
CO2 BLDA-SCNC: 28 MMOL/L (ref 23–27)
CO2 BLDA-SCNC: 28.2 MMOL/L (ref 22–29)
CO2 BLDA-SCNC: 28.5 MMOL/L (ref 22–29)
CO2 BLDA-SCNC: 28.8 MMOL/L (ref 22–29)
CO2 BLDA-SCNC: 29 MMOL/L (ref 23–27)
CO2 BLDA-SCNC: 29 MMOL/L (ref 23–27)
CO2 BLDA-SCNC: 29.2 MMOL/L (ref 22–29)
CO2 BLDA-SCNC: 30 MMOL/L (ref 23–27)
CO2 CONTENT VENOUS: 30 MMOL/L (ref 24–29)
CO2 SERPL-SCNC: 24.5 MMOL/L (ref 22–29)
CREAT SERPL-MCNC: 1.1 MG/DL (ref 0.76–1.27)
CROSSMATCH INTERPRETATION: NORMAL
CROSSMATCH INTERPRETATION: NORMAL
DEPRECATED RDW RBC AUTO: 39 FL (ref 37–54)
DEPRECATED RDW RBC AUTO: 39.8 FL (ref 37–54)
EGFRCR SERPLBLD CKD-EPI 2021: 87 ML/MIN/1.73
EOSINOPHIL # BLD AUTO: 0.06 10*3/MM3 (ref 0–0.4)
EOSINOPHIL NFR BLD AUTO: 0.4 % (ref 0.3–6.2)
ERYTHROCYTE [DISTWIDTH] IN BLOOD BY AUTOMATED COUNT: 12.4 % (ref 12.3–15.4)
ERYTHROCYTE [DISTWIDTH] IN BLOOD BY AUTOMATED COUNT: 12.6 % (ref 12.3–15.4)
GLUCOSE BLDC GLUCOMTR-MCNC: 103 MG/DL (ref 74–100)
GLUCOSE BLDC GLUCOMTR-MCNC: 109 MG/DL (ref 70–105)
GLUCOSE BLDC GLUCOMTR-MCNC: 111 MG/DL (ref 74–100)
GLUCOSE BLDC GLUCOMTR-MCNC: 118 MG/DL (ref 70–105)
GLUCOSE BLDC GLUCOMTR-MCNC: 121 MG/DL (ref 70–105)
GLUCOSE BLDC GLUCOMTR-MCNC: 124 MG/DL (ref 74–100)
GLUCOSE BLDC GLUCOMTR-MCNC: 127 MG/DL (ref 74–100)
GLUCOSE BLDC GLUCOMTR-MCNC: 131 MG/DL (ref 74–100)
GLUCOSE BLDC GLUCOMTR-MCNC: 132 MG/DL (ref 74–100)
GLUCOSE BLDC GLUCOMTR-MCNC: 133 MG/DL (ref 70–105)
GLUCOSE BLDC GLUCOMTR-MCNC: 136 MG/DL (ref 70–105)
GLUCOSE BLDC GLUCOMTR-MCNC: 139 MG/DL (ref 70–105)
GLUCOSE BLDC GLUCOMTR-MCNC: 143 MG/DL (ref 70–105)
GLUCOSE BLDC GLUCOMTR-MCNC: 148 MG/DL (ref 70–105)
GLUCOSE BLDC GLUCOMTR-MCNC: 149 MG/DL (ref 74–100)
GLUCOSE BLDC GLUCOMTR-MCNC: 160 MG/DL (ref 70–105)
GLUCOSE BLDC GLUCOMTR-MCNC: 167 MG/DL (ref 70–105)
GLUCOSE BLDC GLUCOMTR-MCNC: 170 MG/DL (ref 74–100)
GLUCOSE BLDC GLUCOMTR-MCNC: 179 MG/DL (ref 74–100)
GLUCOSE BLDC GLUCOMTR-MCNC: 180 MG/DL (ref 70–105)
GLUCOSE SERPL-MCNC: 104 MG/DL (ref 65–99)
HCO3 BLDA-SCNC: 25.9 MMOL/L (ref 22–26)
HCO3 BLDA-SCNC: 26.2 MMOL/L (ref 22–26)
HCO3 BLDA-SCNC: 26.4 MMOL/L (ref 21–28)
HCO3 BLDA-SCNC: 26.6 MMOL/L (ref 22–26)
HCO3 BLDA-SCNC: 26.8 MMOL/L (ref 21–28)
HCO3 BLDA-SCNC: 26.9 MMOL/L (ref 21–28)
HCO3 BLDA-SCNC: 27 MMOL/L (ref 21–28)
HCO3 BLDA-SCNC: 27.2 MMOL/L (ref 21–28)
HCO3 BLDA-SCNC: 27.2 MMOL/L (ref 21–28)
HCO3 BLDA-SCNC: 27.4 MMOL/L (ref 21–28)
HCO3 BLDA-SCNC: 27.4 MMOL/L (ref 22–26)
HCO3 BLDA-SCNC: 27.4 MMOL/L (ref 22–26)
HCO3 BLDA-SCNC: 27.8 MMOL/L (ref 21–28)
HCO3 BLDA-SCNC: 27.9 MMOL/L (ref 21–28)
HCO3 BLDA-SCNC: 28 MMOL/L (ref 21–28)
HCO3 BLDA-SCNC: 28.4 MMOL/L (ref 21–28)
HCO3 BLDA-SCNC: 28.5 MMOL/L (ref 22–26)
HCO3 BLDV-SCNC: 28.3 MMOL/L (ref 23–28)
HCT VFR BLD AUTO: 34.6 % (ref 37.5–51)
HCT VFR BLD AUTO: 37.1 % (ref 37.5–51)
HCT VFR BLDA CALC: 31 % (ref 38–51)
HCT VFR BLDA CALC: 32 % (ref 38–51)
HCT VFR BLDA CALC: 33 % (ref 38–51)
HCT VFR BLDA CALC: 35 % (ref 38–51)
HCT VFR BLDA CALC: 39 % (ref 38–51)
HEMODILUTION: NO
HGB BLD-MCNC: 11.7 G/DL (ref 13–17.7)
HGB BLD-MCNC: 12.7 G/DL (ref 13–17.7)
HGB BLDA-MCNC: 10.5 G/DL (ref 12–17)
HGB BLDA-MCNC: 10.9 G/DL (ref 12–17)
HGB BLDA-MCNC: 11.2 G/DL (ref 12–17)
HGB BLDA-MCNC: 11.9 G/DL (ref 12–17)
HGB BLDA-MCNC: 13.3 G/DL (ref 12–17)
IMM GRANULOCYTES # BLD AUTO: 0.08 10*3/MM3 (ref 0–0.05)
IMM GRANULOCYTES NFR BLD AUTO: 0.5 % (ref 0–0.5)
INHALED O2 CONCENTRATION: 36 %
INHALED O2 CONCENTRATION: 40 %
INHALED O2 CONCENTRATION: 50 %
INHALED O2 CONCENTRATION: 70 %
INR PPP: 1.3 (ref 0.9–1.1)
LYMPHOCYTES # BLD AUTO: 1.66 10*3/MM3 (ref 0.7–3.1)
LYMPHOCYTES NFR BLD AUTO: 11 % (ref 19.6–45.3)
MCH RBC QN AUTO: 29.3 PG (ref 26.6–33)
MCH RBC QN AUTO: 29.4 PG (ref 26.6–33)
MCHC RBC AUTO-ENTMCNC: 33.8 G/DL (ref 31.5–35.7)
MCHC RBC AUTO-ENTMCNC: 34.2 G/DL (ref 31.5–35.7)
MCV RBC AUTO: 85.9 FL (ref 79–97)
MCV RBC AUTO: 86.5 FL (ref 79–97)
MODALITY: ABNORMAL
MODALITY: NORMAL
MONOCYTES # BLD AUTO: 1.44 10*3/MM3 (ref 0.1–0.9)
MONOCYTES NFR BLD AUTO: 9.5 % (ref 5–12)
NEUTROPHILS NFR BLD AUTO: 11.82 10*3/MM3 (ref 1.7–7)
NEUTROPHILS NFR BLD AUTO: 78.1 % (ref 42.7–76)
NRBC BLD AUTO-RTO: 0 /100 WBC (ref 0–0.2)
PCO2 BLDA: 40.7 MM HG (ref 35–48)
PCO2 BLDA: 43.1 MM HG (ref 35–48)
PCO2 BLDA: 43.5 MM HG (ref 35–48)
PCO2 BLDA: 46.9 MM HG (ref 35–48)
PCO2 BLDA: 48.3 MM HG (ref 35–48)
PCO2 BLDA: 50.3 MM HG (ref 35–48)
PCO2 BLDA: 52.2 MM HG (ref 35–48)
PCO2 BLDA: 52.8 MM HG (ref 35–45)
PCO2 BLDA: 52.8 MM HG (ref 35–48)
PCO2 BLDA: 55.8 MM HG (ref 35–45)
PCO2 BLDA: 57.9 MM HG (ref 35–48)
PCO2 BLDA: 58.7 MM HG (ref 35–48)
PCO2 BLDA: 59.1 MM HG (ref 35–45)
PCO2 BLDA: 59.4 MM HG (ref 35–45)
PCO2 BLDA: 61.8 MM HG (ref 35–48)
PCO2 BLDA: 62.5 MM HG (ref 35–45)
PCO2 BLDA: 64.4 MM HG (ref 35–45)
PCO2 BLDV: 67 MM HG (ref 41–51)
PEEP RESPIRATORY: 5 CM[H2O]
PEEP RESPIRATORY: 8 CM[H2O]
PEEP RESPIRATORY: 8 CM[H2O]
PH BLDA: 7.24 PH UNITS (ref 7.35–7.45)
PH BLDA: 7.26 PH UNITS (ref 7.35–7.45)
PH BLDA: 7.26 PH UNITS (ref 7.35–7.45)
PH BLDA: 7.27 PH UNITS (ref 7.35–7.45)
PH BLDA: 7.28 PH UNITS (ref 7.35–7.45)
PH BLDA: 7.29 PH UNITS (ref 7.35–7.45)
PH BLDA: 7.3 PH UNITS (ref 7.35–7.45)
PH BLDA: 7.32 PH UNITS (ref 7.35–7.45)
PH BLDA: 7.34 PH UNITS (ref 7.35–7.45)
PH BLDA: 7.34 PH UNITS (ref 7.35–7.45)
PH BLDA: 7.36 PH UNITS (ref 7.35–7.45)
PH BLDA: 7.38 PH UNITS (ref 7.35–7.45)
PH BLDA: 7.4 PH UNITS (ref 7.35–7.45)
PH BLDA: 7.4 PH UNITS (ref 7.35–7.45)
PH BLDA: 7.43 PH UNITS (ref 7.35–7.45)
PH BLDV: 7.23 PH UNITS (ref 7.31–7.41)
PHOSPHATE SERPL-MCNC: 3.4 MG/DL (ref 2.5–4.5)
PLATELET # BLD AUTO: 150 10*3/MM3 (ref 140–450)
PLATELET # BLD AUTO: 167 10*3/MM3 (ref 140–450)
PMV BLD AUTO: 9.6 FL (ref 6–12)
PMV BLD AUTO: 9.8 FL (ref 6–12)
PO2 BLD: 157 MM[HG] (ref 0–500)
PO2 BLD: 166 MM[HG] (ref 0–500)
PO2 BLD: 181 MM[HG] (ref 0–500)
PO2 BLD: 190 MM[HG] (ref 0–500)
PO2 BLD: 223 MM[HG] (ref 0–500)
PO2 BLD: 237 MM[HG] (ref 0–500)
PO2 BLD: 246 MM[HG] (ref 0–500)
PO2 BLD: 247 MM[HG] (ref 0–500)
PO2 BLD: 251 MM[HG] (ref 0–500)
PO2 BLDA: 100.4 MM HG (ref 83–108)
PO2 BLDA: 109.7 MM HG (ref 83–108)
PO2 BLDA: 111.5 MM HG (ref 83–108)
PO2 BLDA: 129 MM HG (ref 80–105)
PO2 BLDA: 335 MM HG (ref 80–105)
PO2 BLDA: 358 MM HG (ref 80–105)
PO2 BLDA: 374 MM HG (ref 80–105)
PO2 BLDA: 384 MM HG (ref 80–105)
PO2 BLDA: 500 MM HG (ref 80–105)
PO2 BLDA: 68.4 MM HG (ref 83–108)
PO2 BLDA: 82.9 MM HG (ref 83–108)
PO2 BLDA: 89.2 MM HG (ref 83–108)
PO2 BLDA: 89.2 MM HG (ref 83–108)
PO2 BLDA: 90.6 MM HG (ref 83–108)
PO2 BLDA: 94.7 MM HG (ref 83–108)
PO2 BLDA: 98.2 MM HG (ref 83–108)
PO2 BLDA: 98.8 MM HG (ref 83–108)
PO2 BLDV: 45 MM HG (ref 35–42)
POTASSIUM BLDA-SCNC: 4.4 MMOL/L (ref 3.5–4.9)
POTASSIUM BLDA-SCNC: 5.7 MMOL/L (ref 3.5–4.9)
POTASSIUM BLDA-SCNC: 5.7 MMOL/L (ref 3.5–4.9)
POTASSIUM BLDA-SCNC: 5.9 MMOL/L (ref 3.5–4.9)
POTASSIUM BLDA-SCNC: 6.6 MMOL/L (ref 3.5–4.9)
POTASSIUM BLDA-SCNC: 6.7 MMOL/L (ref 3.5–4.9)
POTASSIUM BLDA-SCNC: 6.7 MMOL/L (ref 3.5–4.9)
POTASSIUM SERPL-SCNC: 4.8 MMOL/L (ref 3.5–5.2)
PROTHROMBIN TIME: 16.2 SECONDS (ref 11.7–14.2)
PSV: 0 CMH2O
PSV: 10 CMH2O
PSV: 8 CMH2O
QT INTERVAL: 405 MS
QTC INTERVAL: 448 MS
RBC # BLD AUTO: 4 10*6/MM3 (ref 4.14–5.8)
RBC # BLD AUTO: 4.32 10*6/MM3 (ref 4.14–5.8)
RESPIRATORY RATE: 13
RESPIRATORY RATE: 14
RESPIRATORY RATE: 16
RESPIRATORY RATE: 16
RESPIRATORY RATE: 18
SAO2 % BLDCOA: 100 % (ref 95–98)
SAO2 % BLDCOA: 90.1 % (ref 94–98)
SAO2 % BLDCOA: 95.3 % (ref 94–98)
SAO2 % BLDCOA: 95.4 % (ref 94–98)
SAO2 % BLDCOA: 96.6 % (ref 94–98)
SAO2 % BLDCOA: 96.8 % (ref 94–98)
SAO2 % BLDCOA: 97 % (ref 94–98)
SAO2 % BLDCOA: 97.3 % (ref 94–98)
SAO2 % BLDCOA: 97.4 % (ref 94–98)
SAO2 % BLDCOA: 97.6 % (ref 94–98)
SAO2 % BLDCOA: 97.7 % (ref 94–98)
SAO2 % BLDCOA: 97.9 % (ref 94–98)
SAO2 % BLDCOA: 98 % (ref 95–98)
SAO2 % BLDCOV: ABNORMAL %
SODIUM BLD-SCNC: 133 MMOL/L (ref 138–146)
SODIUM BLD-SCNC: 134 MMOL/L (ref 138–146)
SODIUM BLD-SCNC: 134 MMOL/L (ref 138–146)
SODIUM BLD-SCNC: 135 MMOL/L (ref 138–146)
SODIUM BLD-SCNC: 135 MMOL/L (ref 138–146)
SODIUM BLD-SCNC: 137 MMOL/L (ref 138–146)
SODIUM BLD-SCNC: 139 MMOL/L (ref 138–146)
SODIUM SERPL-SCNC: 142 MMOL/L (ref 136–145)
UNIT  ABO: NORMAL
UNIT  ABO: NORMAL
UNIT  RH: NORMAL
UNIT  RH: NORMAL
VENTILATOR MODE: ABNORMAL
VENTILATOR MODE: ABNORMAL
VENTILATOR MODE: AC
VT ON VENT VENT: 650 ML
VT ON VENT VENT: 700 ML
WBC NRBC COR # BLD AUTO: 14.56 10*3/MM3 (ref 3.4–10.8)
WBC NRBC COR # BLD AUTO: 15.14 10*3/MM3 (ref 3.4–10.8)

## 2025-07-17 PROCEDURE — 33533 CABG ARTERIAL SINGLE: CPT

## 2025-07-17 PROCEDURE — 84295 ASSAY OF SERUM SODIUM: CPT

## 2025-07-17 PROCEDURE — 25010000002 PROTAMINE SULFATE PER 10 MG: Performed by: ANESTHESIOLOGY

## 2025-07-17 PROCEDURE — C1751 CATH, INF, PER/CENT/MIDLINE: HCPCS

## 2025-07-17 PROCEDURE — C1887 CATHETER, GUIDING: HCPCS

## 2025-07-17 PROCEDURE — 94799 UNLISTED PULMONARY SVC/PX: CPT

## 2025-07-17 PROCEDURE — 25010000002 FENTANYL CITRATE (PF) 100 MCG/2ML SOLUTION: Performed by: ANESTHESIOLOGY

## 2025-07-17 PROCEDURE — 85025 COMPLETE CBC W/AUTO DIFF WBC: CPT | Performed by: NURSE PRACTITIONER

## 2025-07-17 PROCEDURE — 25010000002 AMINOCAPROIC ACID PER 5 G: Performed by: ANESTHESIOLOGY

## 2025-07-17 PROCEDURE — 25010000002 PAPAVERINE PER 60 MG

## 2025-07-17 PROCEDURE — 71045 X-RAY EXAM CHEST 1 VIEW: CPT

## 2025-07-17 PROCEDURE — 25010000002 ACETAMINOPHEN 10 MG/ML SOLUTION: Performed by: ANESTHESIOLOGY

## 2025-07-17 PROCEDURE — 82947 ASSAY GLUCOSE BLOOD QUANT: CPT

## 2025-07-17 PROCEDURE — 33508 ENDOSCOPIC VEIN HARVEST: CPT

## 2025-07-17 PROCEDURE — 25010000002 ONDANSETRON PER 1 MG: Performed by: ANESTHESIOLOGY

## 2025-07-17 PROCEDURE — 93005 ELECTROCARDIOGRAM TRACING: CPT | Performed by: NURSE PRACTITIONER

## 2025-07-17 PROCEDURE — P9041 ALBUMIN (HUMAN),5%, 50ML: HCPCS | Performed by: ANESTHESIOLOGY

## 2025-07-17 PROCEDURE — 82948 REAGENT STRIP/BLOOD GLUCOSE: CPT

## 2025-07-17 PROCEDURE — B245ZZ4 ULTRASONOGRAPHY OF LEFT HEART, TRANSESOPHAGEAL: ICD-10-PCS

## 2025-07-17 PROCEDURE — C1713 ANCHOR/SCREW BN/BN,TIS/BN: HCPCS

## 2025-07-17 PROCEDURE — 25010000002 NICARDIPINE 2.5 MG/ML SOLUTION 10 ML VIAL: Performed by: NURSE PRACTITIONER

## 2025-07-17 PROCEDURE — 25810000003 SODIUM CHLORIDE 0.9 % SOLUTION: Performed by: ANESTHESIOLOGY

## 2025-07-17 PROCEDURE — 25010000002 FENTANYL CITRATE (PF) 250 MCG/5ML SOLUTION: Performed by: ANESTHESIOLOGY

## 2025-07-17 PROCEDURE — 85610 PROTHROMBIN TIME: CPT | Performed by: NURSE PRACTITIONER

## 2025-07-17 PROCEDURE — 06BQ4ZZ EXCISION OF LEFT SAPHENOUS VEIN, PERCUTANEOUS ENDOSCOPIC APPROACH: ICD-10-PCS

## 2025-07-17 PROCEDURE — 25010000002 ALBUMIN HUMAN 5% PER 50 ML: Performed by: ANESTHESIOLOGY

## 2025-07-17 PROCEDURE — 25010000002 MORPHINE PER 10 MG: Performed by: NURSE PRACTITIONER

## 2025-07-17 PROCEDURE — 25010000002 ALBUMIN HUMAN 5% PER 50 ML: Performed by: NURSE PRACTITIONER

## 2025-07-17 PROCEDURE — 82330 ASSAY OF CALCIUM: CPT

## 2025-07-17 PROCEDURE — 63710000001 INSULIN REGULAR HUMAN PER 5 UNITS: Performed by: ANESTHESIOLOGY

## 2025-07-17 PROCEDURE — 25010000002 MIDAZOLAM PER 1 MG: Performed by: ANESTHESIOLOGY

## 2025-07-17 PROCEDURE — 94761 N-INVAS EAR/PLS OXIMETRY MLT: CPT

## 2025-07-17 PROCEDURE — 25010000002 CEFAZOLIN PER 500 MG: Performed by: ANESTHESIOLOGY

## 2025-07-17 PROCEDURE — 82803 BLOOD GASES ANY COMBINATION: CPT

## 2025-07-17 PROCEDURE — P9041 ALBUMIN (HUMAN),5%, 50ML: HCPCS | Performed by: NURSE PRACTITIONER

## 2025-07-17 PROCEDURE — 25810000003 SODIUM CHLORIDE 0.9 % SOLUTION 250 ML FLEX CONT: Performed by: NURSE PRACTITIONER

## 2025-07-17 PROCEDURE — 82803 BLOOD GASES ANY COMBINATION: CPT | Performed by: NURSE PRACTITIONER

## 2025-07-17 PROCEDURE — 25010000002 CALCIUM GLUCONATE PER 10 ML: Performed by: ANESTHESIOLOGY

## 2025-07-17 PROCEDURE — 93010 ELECTROCARDIOGRAM REPORT: CPT | Performed by: INTERNAL MEDICINE

## 2025-07-17 PROCEDURE — 25010000002 MAGNESIUM SULFATE IN D5W 1G/100ML (PREMIX) 1-5 GM/100ML-% SOLUTION: Performed by: NURSE PRACTITIONER

## 2025-07-17 PROCEDURE — 33518 CABG ARTERY-VEIN TWO: CPT

## 2025-07-17 PROCEDURE — 25010000002 HEPARIN (PORCINE) PER 1000 UNITS

## 2025-07-17 PROCEDURE — 94002 VENT MGMT INPAT INIT DAY: CPT

## 2025-07-17 PROCEDURE — 85730 THROMBOPLASTIN TIME PARTIAL: CPT | Performed by: NURSE PRACTITIONER

## 2025-07-17 PROCEDURE — 25010000002 HEPARIN (PORCINE) PER 1000 UNITS: Performed by: ANESTHESIOLOGY

## 2025-07-17 PROCEDURE — C1729 CATH, DRAINAGE: HCPCS

## 2025-07-17 PROCEDURE — 80069 RENAL FUNCTION PANEL: CPT | Performed by: NURSE PRACTITIONER

## 2025-07-17 PROCEDURE — 25010000002 ONDANSETRON PER 1 MG: Performed by: NURSE PRACTITIONER

## 2025-07-17 PROCEDURE — 84132 ASSAY OF SERUM POTASSIUM: CPT

## 2025-07-17 PROCEDURE — 93318 ECHO TRANSESOPHAGEAL INTRAOP: CPT | Performed by: ANESTHESIOLOGY

## 2025-07-17 PROCEDURE — 021109W BYPASS CORONARY ARTERY, TWO ARTERIES FROM AORTA WITH AUTOLOGOUS VENOUS TISSUE, OPEN APPROACH: ICD-10-PCS

## 2025-07-17 PROCEDURE — 85347 COAGULATION TIME ACTIVATED: CPT

## 2025-07-17 PROCEDURE — 25010000002 MAGNESIUM SULFATE PER 500 MG OF MAGNESIUM: Performed by: ANESTHESIOLOGY

## 2025-07-17 PROCEDURE — 85014 HEMATOCRIT: CPT

## 2025-07-17 PROCEDURE — 25010000002 NICARDIPINE 2.5 MG/ML SOLUTION: Performed by: ANESTHESIOLOGY

## 2025-07-17 PROCEDURE — 5A1221Z PERFORMANCE OF CARDIAC OUTPUT, CONTINUOUS: ICD-10-PCS

## 2025-07-17 PROCEDURE — 82330 ASSAY OF CALCIUM: CPT | Performed by: NURSE PRACTITIONER

## 2025-07-17 PROCEDURE — A4648 IMPLANTABLE TISSUE MARKER: HCPCS

## 2025-07-17 PROCEDURE — 85027 COMPLETE CBC AUTOMATED: CPT | Performed by: NURSE PRACTITIONER

## 2025-07-17 PROCEDURE — 25010000002 CEFAZOLIN PER 500 MG: Performed by: NURSE PRACTITIONER

## 2025-07-17 PROCEDURE — C1751 CATH, INF, PER/CENT/MIDLINE: HCPCS | Performed by: ANESTHESIOLOGY

## 2025-07-17 PROCEDURE — 99222 1ST HOSP IP/OBS MODERATE 55: CPT | Performed by: INTERNAL MEDICINE

## 2025-07-17 PROCEDURE — 25010000002 ACETAMINOPHEN 10 MG/ML SOLUTION: Performed by: NURSE PRACTITIONER

## 2025-07-17 PROCEDURE — 02100Z9 BYPASS CORONARY ARTERY, ONE ARTERY FROM LEFT INTERNAL MAMMARY, OPEN APPROACH: ICD-10-PCS

## 2025-07-17 DEVICE — SS SUTURE, 3 PER SLEEVE
Type: IMPLANTABLE DEVICE | Site: STERNUM | Status: FUNCTIONAL
Brand: MYO/WIRE II

## 2025-07-17 DEVICE — DEV CONTRL TISS STRATAFIXSPIRALMNCRYL PLSPS2 REV3/0 15CM: Type: IMPLANTABLE DEVICE | Site: LEG | Status: FUNCTIONAL

## 2025-07-17 DEVICE — WAX,BONE,NATURAL
Type: IMPLANTABLE DEVICE | Site: STERNUM | Status: FUNCTIONAL
Brand: MEDLINE INDUSTRIES

## 2025-07-17 DEVICE — CLIP LIGAT VASC HORIZON TI SM/WD RED 24CT: Type: IMPLANTABLE DEVICE | Site: HEART | Status: FUNCTIONAL

## 2025-07-17 DEVICE — ABSORBABLE HEMOSTAT (OXIDIZED REGENERATED CELLULOSE)
Type: IMPLANTABLE DEVICE | Site: HEART | Status: FUNCTIONAL
Brand: SURGICEL

## 2025-07-17 DEVICE — DEV CONTRL TISS STRATAFIX SPIRAL MNCRYL UD 3/0 PLS 30CM: Type: IMPLANTABLE DEVICE | Site: CHEST | Status: FUNCTIONAL

## 2025-07-17 DEVICE — SS SUTURE, 6 PER SLEEVE
Type: IMPLANTABLE DEVICE | Site: STERNUM | Status: FUNCTIONAL
Brand: MYO/WIRE II

## 2025-07-17 DEVICE — TEMP PACING WIRE
Type: IMPLANTABLE DEVICE | Site: HEART | Status: FUNCTIONAL
Brand: MYO/WIRE

## 2025-07-17 RX ORDER — ACETAMINOPHEN 325 MG/1
650 TABLET ORAL EVERY 4 HOURS PRN
Status: DISCONTINUED | OUTPATIENT
Start: 2025-07-17 | End: 2025-07-17 | Stop reason: HOSPADM

## 2025-07-17 RX ORDER — SODIUM CHLORIDE 0.9 % (FLUSH) 0.9 %
3-10 SYRINGE (ML) INJECTION AS NEEDED
Status: DISCONTINUED | OUTPATIENT
Start: 2025-07-17 | End: 2025-07-17 | Stop reason: HOSPADM

## 2025-07-17 RX ORDER — MIDAZOLAM HYDROCHLORIDE 1 MG/ML
INJECTION, SOLUTION INTRAMUSCULAR; INTRAVENOUS AS NEEDED
Status: DISCONTINUED | OUTPATIENT
Start: 2025-07-17 | End: 2025-07-17 | Stop reason: SURG

## 2025-07-17 RX ORDER — MAGNESIUM SULFATE 1 G/100ML
1 INJECTION INTRAVENOUS EVERY 8 HOURS
Status: COMPLETED | OUTPATIENT
Start: 2025-07-17 | End: 2025-07-18

## 2025-07-17 RX ORDER — NOREPINEPHRINE BITARTRATE 0.03 MG/ML
.02-.06 INJECTION, SOLUTION INTRAVENOUS CONTINUOUS PRN
Status: DISCONTINUED | OUTPATIENT
Start: 2025-07-17 | End: 2025-07-18

## 2025-07-17 RX ORDER — NOREPINEPHRINE BITARTRATE 0.03 MG/ML
INJECTION, SOLUTION INTRAVENOUS CONTINUOUS PRN
Status: DISCONTINUED | OUTPATIENT
Start: 2025-07-17 | End: 2025-07-17 | Stop reason: SURG

## 2025-07-17 RX ORDER — SODIUM CHLORIDE 0.9 % (FLUSH) 0.9 %
3 SYRINGE (ML) INJECTION EVERY 12 HOURS SCHEDULED
Status: DISCONTINUED | OUTPATIENT
Start: 2025-07-17 | End: 2025-07-17 | Stop reason: HOSPADM

## 2025-07-17 RX ORDER — MEPERIDINE HYDROCHLORIDE 25 MG/ML
25 INJECTION INTRAMUSCULAR; INTRAVENOUS; SUBCUTANEOUS ONCE AS NEEDED
Status: DISCONTINUED | OUTPATIENT
Start: 2025-07-17 | End: 2025-07-18

## 2025-07-17 RX ORDER — CHLORHEXIDINE GLUCONATE 500 MG/1
CLOTH TOPICAL EVERY 12 HOURS PRN
Status: DISCONTINUED | OUTPATIENT
Start: 2025-07-17 | End: 2025-07-17 | Stop reason: HOSPADM

## 2025-07-17 RX ORDER — NICARDIPINE HYDROCHLORIDE 2.5 MG/ML
INJECTION INTRAVENOUS CONTINUOUS PRN
Status: DISCONTINUED | OUTPATIENT
Start: 2025-07-17 | End: 2025-07-17 | Stop reason: SURG

## 2025-07-17 RX ORDER — CYCLOBENZAPRINE HCL 10 MG
10 TABLET ORAL EVERY 8 HOURS PRN
Status: DISCONTINUED | OUTPATIENT
Start: 2025-07-18 | End: 2025-07-21 | Stop reason: HOSPADM

## 2025-07-17 RX ORDER — ATORVASTATIN CALCIUM 40 MG/1
40 TABLET, FILM COATED ORAL NIGHTLY
Status: DISCONTINUED | OUTPATIENT
Start: 2025-07-18 | End: 2025-07-21 | Stop reason: HOSPADM

## 2025-07-17 RX ORDER — IBUPROFEN 600 MG/1
1 TABLET ORAL
Status: DISCONTINUED | OUTPATIENT
Start: 2025-07-17 | End: 2025-07-17 | Stop reason: HOSPADM

## 2025-07-17 RX ORDER — ETOMIDATE 2 MG/ML
INJECTION INTRAVENOUS AS NEEDED
Status: DISCONTINUED | OUTPATIENT
Start: 2025-07-17 | End: 2025-07-17 | Stop reason: SURG

## 2025-07-17 RX ORDER — ENOXAPARIN SODIUM 100 MG/ML
40 INJECTION SUBCUTANEOUS EVERY 12 HOURS SCHEDULED
Status: DISCONTINUED | OUTPATIENT
Start: 2025-07-18 | End: 2025-07-21 | Stop reason: HOSPADM

## 2025-07-17 RX ORDER — CARVEDILOL 3.12 MG/1
3.12 TABLET ORAL ONCE
Status: DISCONTINUED | OUTPATIENT
Start: 2025-07-17 | End: 2025-07-17 | Stop reason: HOSPADM

## 2025-07-17 RX ORDER — ALBUMIN HUMAN 50 G/1000ML
SOLUTION INTRAVENOUS CONTINUOUS PRN
Status: DISCONTINUED | OUTPATIENT
Start: 2025-07-17 | End: 2025-07-17 | Stop reason: SURG

## 2025-07-17 RX ORDER — ACETAMINOPHEN 160 MG/5ML
650 SOLUTION ORAL EVERY 4 HOURS PRN
Status: DISCONTINUED | OUTPATIENT
Start: 2025-07-18 | End: 2025-07-21 | Stop reason: HOSPADM

## 2025-07-17 RX ORDER — SODIUM CHLORIDE 9 MG/ML
INJECTION, SOLUTION INTRAVENOUS CONTINUOUS PRN
Status: DISCONTINUED | OUTPATIENT
Start: 2025-07-17 | End: 2025-07-17 | Stop reason: SURG

## 2025-07-17 RX ORDER — CEFAZOLIN 2 G/1
INJECTION, POWDER, FOR SOLUTION INTRAMUSCULAR; INTRAVENOUS AS NEEDED
Status: DISCONTINUED | OUTPATIENT
Start: 2025-07-17 | End: 2025-07-17 | Stop reason: SURG

## 2025-07-17 RX ORDER — ONDANSETRON 2 MG/ML
4 INJECTION INTRAMUSCULAR; INTRAVENOUS EVERY 6 HOURS PRN
Status: DISCONTINUED | OUTPATIENT
Start: 2025-07-17 | End: 2025-07-21 | Stop reason: HOSPADM

## 2025-07-17 RX ORDER — CALCIUM GLUCONATE 98 MG/ML
INJECTION, SOLUTION INTRAVENOUS AS NEEDED
Status: DISCONTINUED | OUTPATIENT
Start: 2025-07-17 | End: 2025-07-17 | Stop reason: SURG

## 2025-07-17 RX ORDER — ONDANSETRON 2 MG/ML
INJECTION INTRAMUSCULAR; INTRAVENOUS AS NEEDED
Status: DISCONTINUED | OUTPATIENT
Start: 2025-07-17 | End: 2025-07-17 | Stop reason: SURG

## 2025-07-17 RX ORDER — ALPRAZOLAM 0.25 MG
0.25 TABLET ORAL ONCE
Status: DISCONTINUED | OUTPATIENT
Start: 2025-07-17 | End: 2025-07-17 | Stop reason: HOSPADM

## 2025-07-17 RX ORDER — ALBUMIN HUMAN 50 G/1000ML
12.5 SOLUTION INTRAVENOUS AS NEEDED
Status: DISPENSED | OUTPATIENT
Start: 2025-07-17 | End: 2025-07-18

## 2025-07-17 RX ORDER — ACETAMINOPHEN 650 MG/1
650 SUPPOSITORY RECTAL EVERY 4 HOURS PRN
Status: DISCONTINUED | OUTPATIENT
Start: 2025-07-18 | End: 2025-07-21 | Stop reason: HOSPADM

## 2025-07-17 RX ORDER — MORPHINE SULFATE 2 MG/ML
2 INJECTION, SOLUTION INTRAMUSCULAR; INTRAVENOUS
Status: DISPENSED | OUTPATIENT
Start: 2025-07-17 | End: 2025-07-20

## 2025-07-17 RX ORDER — GABAPENTIN 400 MG/1
400 CAPSULE ORAL 3 TIMES DAILY
Status: DISCONTINUED | OUTPATIENT
Start: 2025-07-17 | End: 2025-07-21 | Stop reason: HOSPADM

## 2025-07-17 RX ORDER — DEXMEDETOMIDINE HYDROCHLORIDE 4 UG/ML
.2-1.5 INJECTION, SOLUTION INTRAVENOUS
Status: DISCONTINUED | OUTPATIENT
Start: 2025-07-17 | End: 2025-07-18

## 2025-07-17 RX ORDER — NALOXONE HCL 0.4 MG/ML
0.4 VIAL (ML) INJECTION
Status: DISCONTINUED | OUTPATIENT
Start: 2025-07-17 | End: 2025-07-21 | Stop reason: HOSPADM

## 2025-07-17 RX ORDER — ASPIRIN 81 MG/1
81 TABLET ORAL DAILY
Status: DISCONTINUED | OUTPATIENT
Start: 2025-07-18 | End: 2025-07-21 | Stop reason: HOSPADM

## 2025-07-17 RX ORDER — ACETAMINOPHEN 10 MG/ML
INJECTION, SOLUTION INTRAVENOUS AS NEEDED
Status: DISCONTINUED | OUTPATIENT
Start: 2025-07-17 | End: 2025-07-17 | Stop reason: SURG

## 2025-07-17 RX ORDER — ACETAMINOPHEN 10 MG/ML
1000 INJECTION, SOLUTION INTRAVENOUS EVERY 8 HOURS
Status: COMPLETED | OUTPATIENT
Start: 2025-07-17 | End: 2025-07-18

## 2025-07-17 RX ORDER — AMINOCAPROIC ACID 250 MG/ML
INJECTION, SOLUTION INTRAVENOUS AS NEEDED
Status: DISCONTINUED | OUTPATIENT
Start: 2025-07-17 | End: 2025-07-17 | Stop reason: SURG

## 2025-07-17 RX ORDER — DEXTROSE MONOHYDRATE 25 G/50ML
10-50 INJECTION, SOLUTION INTRAVENOUS
Status: DISCONTINUED | OUTPATIENT
Start: 2025-07-17 | End: 2025-07-17 | Stop reason: HOSPADM

## 2025-07-17 RX ORDER — PANTOPRAZOLE SODIUM 40 MG/1
40 TABLET, DELAYED RELEASE ORAL
Status: DISCONTINUED | OUTPATIENT
Start: 2025-07-18 | End: 2025-07-21 | Stop reason: HOSPADM

## 2025-07-17 RX ORDER — NITROGLYCERIN 0.4 MG/1
0.4 TABLET SUBLINGUAL
Status: DISCONTINUED | OUTPATIENT
Start: 2025-07-17 | End: 2025-07-17 | Stop reason: HOSPADM

## 2025-07-17 RX ORDER — SODIUM CHLORIDE 9 MG/ML
30 INJECTION, SOLUTION INTRAVENOUS CONTINUOUS PRN
Status: DISCONTINUED | OUTPATIENT
Start: 2025-07-17 | End: 2025-07-19

## 2025-07-17 RX ORDER — MAGNESIUM HYDROXIDE 1200 MG/15ML
LIQUID ORAL AS NEEDED
Status: DISCONTINUED | OUTPATIENT
Start: 2025-07-17 | End: 2025-07-17 | Stop reason: HOSPADM

## 2025-07-17 RX ORDER — NITROGLYCERIN 0.4 MG/1
0.4 TABLET SUBLINGUAL
Status: DISCONTINUED | OUTPATIENT
Start: 2025-07-17 | End: 2025-07-21 | Stop reason: HOSPADM

## 2025-07-17 RX ORDER — AMOXICILLIN 250 MG
2 CAPSULE ORAL 2 TIMES DAILY
Status: DISCONTINUED | OUTPATIENT
Start: 2025-07-17 | End: 2025-07-21 | Stop reason: HOSPADM

## 2025-07-17 RX ORDER — FENTANYL CITRATE 50 UG/ML
INJECTION, SOLUTION INTRAMUSCULAR; INTRAVENOUS AS NEEDED
Status: DISCONTINUED | OUTPATIENT
Start: 2025-07-17 | End: 2025-07-17 | Stop reason: SURG

## 2025-07-17 RX ORDER — HYDROCODONE BITARTRATE AND ACETAMINOPHEN 5; 325 MG/1; MG/1
1 TABLET ORAL EVERY 4 HOURS PRN
Status: DISCONTINUED | OUTPATIENT
Start: 2025-07-17 | End: 2025-07-21 | Stop reason: HOSPADM

## 2025-07-17 RX ORDER — VECURONIUM BROMIDE 1 MG/ML
INJECTION, POWDER, LYOPHILIZED, FOR SOLUTION INTRAVENOUS AS NEEDED
Status: DISCONTINUED | OUTPATIENT
Start: 2025-07-17 | End: 2025-07-17 | Stop reason: SURG

## 2025-07-17 RX ORDER — CHLORHEXIDINE GLUCONATE ORAL RINSE 1.2 MG/ML
15 SOLUTION DENTAL ONCE
Status: DISCONTINUED | OUTPATIENT
Start: 2025-07-17 | End: 2025-07-17 | Stop reason: HOSPADM

## 2025-07-17 RX ORDER — DEXTROSE MONOHYDRATE 25 G/50ML
10-50 INJECTION, SOLUTION INTRAVENOUS
Status: DISCONTINUED | OUTPATIENT
Start: 2025-07-17 | End: 2025-07-19

## 2025-07-17 RX ORDER — IBUPROFEN 600 MG/1
1 TABLET ORAL
Status: DISCONTINUED | OUTPATIENT
Start: 2025-07-17 | End: 2025-07-19

## 2025-07-17 RX ORDER — SODIUM CHLORIDE 9 MG/ML
40 INJECTION, SOLUTION INTRAVENOUS AS NEEDED
Status: DISCONTINUED | OUTPATIENT
Start: 2025-07-17 | End: 2025-07-17 | Stop reason: HOSPADM

## 2025-07-17 RX ORDER — NITROGLYCERIN 20 MG/100ML
5-50 INJECTION INTRAVENOUS CONTINUOUS PRN
Status: DISCONTINUED | OUTPATIENT
Start: 2025-07-17 | End: 2025-07-18

## 2025-07-17 RX ORDER — POLYETHYLENE GLYCOL 3350 17 G/17G
17 POWDER, FOR SOLUTION ORAL 2 TIMES DAILY
Status: DISCONTINUED | OUTPATIENT
Start: 2025-07-18 | End: 2025-07-21 | Stop reason: HOSPADM

## 2025-07-17 RX ORDER — ASPIRIN 325 MG
325 TABLET ORAL ONCE
Status: COMPLETED | OUTPATIENT
Start: 2025-07-17 | End: 2025-07-17

## 2025-07-17 RX ORDER — PAPAVERINE HYDROCHLORIDE 30 MG/ML
INJECTION INTRAMUSCULAR; INTRAVENOUS AS NEEDED
Status: DISCONTINUED | OUTPATIENT
Start: 2025-07-17 | End: 2025-07-17 | Stop reason: HOSPADM

## 2025-07-17 RX ORDER — ACETAMINOPHEN 325 MG/1
650 TABLET ORAL EVERY 4 HOURS PRN
Status: DISCONTINUED | OUTPATIENT
Start: 2025-07-18 | End: 2025-07-21 | Stop reason: HOSPADM

## 2025-07-17 RX ORDER — PANTOPRAZOLE SODIUM 40 MG/10ML
40 INJECTION, POWDER, LYOPHILIZED, FOR SOLUTION INTRAVENOUS ONCE
Status: COMPLETED | OUTPATIENT
Start: 2025-07-17 | End: 2025-07-17

## 2025-07-17 RX ORDER — HEPARIN SODIUM 1000 [USP'U]/ML
INJECTION, SOLUTION INTRAVENOUS; SUBCUTANEOUS AS NEEDED
Status: DISCONTINUED | OUTPATIENT
Start: 2025-07-17 | End: 2025-07-17 | Stop reason: SURG

## 2025-07-17 RX ORDER — SODIUM CHLORIDE 9 MG/ML
30 INJECTION, SOLUTION INTRAVENOUS CONTINUOUS PRN
Status: DISCONTINUED | OUTPATIENT
Start: 2025-07-17 | End: 2025-07-17

## 2025-07-17 RX ORDER — SODIUM CHLORIDE 0.9 % (FLUSH) 0.9 %
30 SYRINGE (ML) INJECTION ONCE AS NEEDED
Status: DISCONTINUED | OUTPATIENT
Start: 2025-07-17 | End: 2025-07-17 | Stop reason: HOSPADM

## 2025-07-17 RX ORDER — VASOPRESSIN IN DEXTROSE 5 % 20/100 ML
.02-.1 PLASTIC BAG, INJECTION (ML) INTRAVENOUS AS NEEDED
Status: DISCONTINUED | OUTPATIENT
Start: 2025-07-17 | End: 2025-07-18

## 2025-07-17 RX ORDER — NICOTINE POLACRILEX 4 MG
15 LOZENGE BUCCAL
Status: DISCONTINUED | OUTPATIENT
Start: 2025-07-17 | End: 2025-07-17 | Stop reason: HOSPADM

## 2025-07-17 RX ORDER — NICOTINE POLACRILEX 4 MG
15 LOZENGE BUCCAL
Status: DISCONTINUED | OUTPATIENT
Start: 2025-07-17 | End: 2025-07-19

## 2025-07-17 RX ADMIN — NICARDIPINE HYDROCHLORIDE 2 MG/HR: 25 INJECTION, SOLUTION INTRAVENOUS at 11:05

## 2025-07-17 RX ADMIN — VECURONIUM BROMIDE 5 MG: 1 INJECTION, POWDER, LYOPHILIZED, FOR SOLUTION INTRAVENOUS at 08:10

## 2025-07-17 RX ADMIN — HYDROCODONE BITARTRATE AND ACETAMINOPHEN 1 TABLET: 5; 325 TABLET ORAL at 19:39

## 2025-07-17 RX ADMIN — ALBUMIN (HUMAN) 12.5 G: 12.5 INJECTION, SOLUTION INTRAVENOUS at 17:59

## 2025-07-17 RX ADMIN — FENTANYL CITRATE 500 MCG: 50 INJECTION, SOLUTION INTRAMUSCULAR; INTRAVENOUS at 07:05

## 2025-07-17 RX ADMIN — HEPARIN SODIUM 30000 UNITS: 1000 INJECTION INTRAVENOUS; SUBCUTANEOUS at 08:50

## 2025-07-17 RX ADMIN — MIDAZOLAM 2 MG: 1 INJECTION INTRAMUSCULAR; INTRAVENOUS at 10:11

## 2025-07-17 RX ADMIN — MIDAZOLAM 5 MG: 1 INJECTION INTRAMUSCULAR; INTRAVENOUS at 06:38

## 2025-07-17 RX ADMIN — SODIUM CHLORIDE: 9 INJECTION, SOLUTION INTRAVENOUS at 06:36

## 2025-07-17 RX ADMIN — MUPIROCIN: 20 OINTMENT TOPICAL at 06:13

## 2025-07-17 RX ADMIN — Medication 50 MG: at 06:38

## 2025-07-17 RX ADMIN — MIDAZOLAM 3 MG: 1 INJECTION INTRAMUSCULAR; INTRAVENOUS at 09:11

## 2025-07-17 RX ADMIN — AMINOCAPROIC ACID 10 G: 250 INJECTION, SOLUTION INTRAVENOUS at 10:43

## 2025-07-17 RX ADMIN — VECURONIUM BROMIDE 6 MG: 1 INJECTION, POWDER, LYOPHILIZED, FOR SOLUTION INTRAVENOUS at 09:13

## 2025-07-17 RX ADMIN — ACETAMINOPHEN 1000 MG: 10 INJECTION, SOLUTION INTRAVENOUS at 10:46

## 2025-07-17 RX ADMIN — MIDAZOLAM 3 MG: 1 INJECTION INTRAMUSCULAR; INTRAVENOUS at 06:45

## 2025-07-17 RX ADMIN — FENTANYL CITRATE 100 MCG: 50 INJECTION, SOLUTION INTRAMUSCULAR; INTRAVENOUS at 11:04

## 2025-07-17 RX ADMIN — SODIUM CHLORIDE 10 MG/HR: 9 INJECTION, SOLUTION INTRAVENOUS at 23:56

## 2025-07-17 RX ADMIN — ETOMIDATE INJECTION 20 MG: 2 SOLUTION INTRAVENOUS at 07:05

## 2025-07-17 RX ADMIN — PROTAMINE SULFATE 450 MG: 10 INJECTION, SOLUTION INTRAVENOUS at 10:22

## 2025-07-17 RX ADMIN — MORPHINE SULFATE 2 MG: 2 INJECTION, SOLUTION INTRAMUSCULAR; INTRAVENOUS at 16:11

## 2025-07-17 RX ADMIN — VECURONIUM BROMIDE 4 MG: 1 INJECTION, POWDER, LYOPHILIZED, FOR SOLUTION INTRAVENOUS at 10:21

## 2025-07-17 RX ADMIN — MORPHINE SULFATE 2 MG: 2 INJECTION, SOLUTION INTRAMUSCULAR; INTRAVENOUS at 14:10

## 2025-07-17 RX ADMIN — INSULIN HUMAN 2 UNITS/HR: 1 INJECTION, SOLUTION INTRAVENOUS at 09:12

## 2025-07-17 RX ADMIN — MAGNESIUM SULFATE HEPTAHYDRATE 1 G: 1 INJECTION, SOLUTION INTRAVENOUS at 21:18

## 2025-07-17 RX ADMIN — ONDANSETRON 4 MG: 2 INJECTION, SOLUTION INTRAMUSCULAR; INTRAVENOUS at 21:47

## 2025-07-17 RX ADMIN — ALBUMIN HUMAN: 0.05 INJECTION, SOLUTION INTRAVENOUS at 10:59

## 2025-07-17 RX ADMIN — CEFAZOLIN 2 G: 2 INJECTION, POWDER, FOR SOLUTION INTRAMUSCULAR; INTRAVENOUS at 20:09

## 2025-07-17 RX ADMIN — VECURONIUM BROMIDE 5 MG: 1 INJECTION, POWDER, LYOPHILIZED, FOR SOLUTION INTRAVENOUS at 07:40

## 2025-07-17 RX ADMIN — MIDAZOLAM 2 MG: 1 INJECTION INTRAMUSCULAR; INTRAVENOUS at 07:05

## 2025-07-17 RX ADMIN — NOREPINEPHRINE BITARTRATE 0.1 MCG/KG/MIN: 32 SOLUTION INTRAVENOUS at 09:11

## 2025-07-17 RX ADMIN — VECURONIUM BROMIDE 6 MG: 1 INJECTION, POWDER, LYOPHILIZED, FOR SOLUTION INTRAVENOUS at 08:54

## 2025-07-17 RX ADMIN — ALBUMIN (HUMAN) 12.5 G: 12.5 INJECTION, SOLUTION INTRAVENOUS at 13:15

## 2025-07-17 RX ADMIN — FENTANYL CITRATE 250 MCG: 50 INJECTION, SOLUTION INTRAMUSCULAR; INTRAVENOUS at 07:20

## 2025-07-17 RX ADMIN — CHLORHEXIDINE GLUCONATE, 0.12% ORAL RINSE 15 ML: 1.2 SOLUTION DENTAL at 06:12

## 2025-07-17 RX ADMIN — ONDANSETRON 4 MG: 2 INJECTION, SOLUTION INTRAMUSCULAR; INTRAVENOUS at 16:11

## 2025-07-17 RX ADMIN — ALBUMIN HUMAN: 0.05 INJECTION, SOLUTION INTRAVENOUS at 10:44

## 2025-07-17 RX ADMIN — Medication 50 MG: at 06:45

## 2025-07-17 RX ADMIN — DEXMEDETOMIDINE 0.7 MCG/KG/HR: 200 INJECTION, SOLUTION INTRAVENOUS at 07:10

## 2025-07-17 RX ADMIN — ASPIRIN 325 MG ORAL TABLET 325 MG: 325 PILL ORAL at 19:30

## 2025-07-17 RX ADMIN — VECURONIUM BROMIDE 10 MG: 1 INJECTION, POWDER, LYOPHILIZED, FOR SOLUTION INTRAVENOUS at 07:05

## 2025-07-17 RX ADMIN — MUPIROCIN 1 APPLICATION: 20 OINTMENT TOPICAL at 20:18

## 2025-07-17 RX ADMIN — INSULIN HUMAN 4 UNITS: 100 INJECTION, SOLUTION PARENTERAL at 09:49

## 2025-07-17 RX ADMIN — FENTANYL CITRATE 100 MCG: 50 INJECTION, SOLUTION INTRAMUSCULAR; INTRAVENOUS at 11:03

## 2025-07-17 RX ADMIN — FENTANYL CITRATE 250 MCG: 50 INJECTION, SOLUTION INTRAMUSCULAR; INTRAVENOUS at 07:12

## 2025-07-17 RX ADMIN — SENNOSIDES AND DOCUSATE SODIUM 2 TABLET: 50; 8.6 TABLET ORAL at 20:18

## 2025-07-17 RX ADMIN — ONDANSETRON 4 MG: 2 INJECTION, SOLUTION INTRAMUSCULAR; INTRAVENOUS at 10:46

## 2025-07-17 RX ADMIN — ACETAMINOPHEN 1000 MG: 10 INJECTION INTRAVENOUS at 19:30

## 2025-07-17 RX ADMIN — GABAPENTIN 400 MG: 400 CAPSULE ORAL at 20:18

## 2025-07-17 RX ADMIN — CEFAZOLIN 3 G: 2 INJECTION, POWDER, FOR SOLUTION INTRAMUSCULAR; INTRAVENOUS at 07:13

## 2025-07-17 RX ADMIN — CEFAZOLIN 3 G: 2 INJECTION, POWDER, FOR SOLUTION INTRAMUSCULAR; INTRAVENOUS at 10:40

## 2025-07-17 RX ADMIN — ALBUMIN (HUMAN) 12.5 G: 12.5 INJECTION, SOLUTION INTRAVENOUS at 15:55

## 2025-07-17 RX ADMIN — AMINOCAPROIC ACID 10 G: 250 INJECTION, SOLUTION INTRAVENOUS at 07:25

## 2025-07-17 RX ADMIN — MORPHINE SULFATE 2 MG: 2 INJECTION, SOLUTION INTRAMUSCULAR; INTRAVENOUS at 22:20

## 2025-07-17 RX ADMIN — CALCIUM GLUCONATE 1 G: 98 INJECTION INTRAVENOUS at 10:23

## 2025-07-17 RX ADMIN — INSULIN HUMAN 2.4 UNITS/HR: 1 INJECTION, SOLUTION INTRAVENOUS at 23:59

## 2025-07-17 RX ADMIN — PANTOPRAZOLE SODIUM 40 MG: 40 INJECTION, POWDER, FOR SOLUTION INTRAVENOUS at 19:29

## 2025-07-17 RX ADMIN — MAGNESIUM SULFATE HEPTAHYDRATE 2 G: 500 INJECTION, SOLUTION INTRAMUSCULAR; INTRAVENOUS at 10:15

## 2025-07-17 RX ADMIN — DEXMEDETOMIDINE HYDROCHLORIDE 0.5 MCG/KG/HR: 4 INJECTION, SOLUTION INTRAVENOUS at 12:30

## 2025-07-17 NOTE — PLAN OF CARE
Problem: Adult Inpatient Plan of Care  Goal: Plan of Care Review  Outcome: Progressing  Goal: Patient-Specific Goal (Individualized)  Outcome: Progressing  Goal: Absence of Hospital-Acquired Illness or Injury  Outcome: Progressing  Intervention: Identify and Manage Fall Risk  Recent Flowsheet Documentation  Taken 7/17/2025 1800 by Rosmery Mccarty RN  Safety Promotion/Fall Prevention: safety round/check completed  Taken 7/17/2025 1700 by Rosmery Mccarty RN  Safety Promotion/Fall Prevention: safety round/check completed  Taken 7/17/2025 1600 by Rosmery Mccarty RN  Safety Promotion/Fall Prevention:   activity supervised   assistive device/personal items within reach   clutter free environment maintained   fall prevention program maintained   lighting adjusted   nonskid shoes/slippers when out of bed   room organization consistent   safety round/check completed  Taken 7/17/2025 1500 by Rosmery Mccarty RN  Safety Promotion/Fall Prevention: safety round/check completed  Taken 7/17/2025 1400 by Rosmery Mccarty RN  Safety Promotion/Fall Prevention: safety round/check completed  Taken 7/17/2025 1300 by Rosmery Mccarty RN  Safety Promotion/Fall Prevention: safety round/check completed  Taken 7/17/2025 1200 by Rosmery Mccarty RN  Safety Promotion/Fall Prevention: safety round/check completed  Taken 7/17/2025 1145 by Rosmery Mccarty RN  Safety Promotion/Fall Prevention:   activity supervised   assistive device/personal items within reach   clutter free environment maintained   fall prevention program maintained   lighting adjusted   nonskid shoes/slippers when out of bed   room organization consistent   safety round/check completed  Intervention: Prevent Skin Injury  Recent Flowsheet Documentation  Taken 7/17/2025 1700 by Rosmery Mccarty RN  Body Position:   turned   left   foot of bed elevated   heels elevated  Taken 7/17/2025 1600 by Rosmery Mccarty RN  Skin Protection:   incontinence pads  utilized   pulse oximeter probe site changed  Taken 7/17/2025 1500 by Rosmery Mccarty RN  Body Position:   turned   right   foot of bed elevated   heels elevated  Taken 7/17/2025 1300 by Rosmery Mccarty RN  Body Position:   turned   left   foot of bed elevated   heels elevated  Taken 7/17/2025 1145 by Rosmery Mccarty RN  Body Position:   turned   right   foot of bed elevated   heels elevated  Skin Protection:   incontinence pads utilized   pulse oximeter probe site changed  Intervention: Prevent and Manage VTE (Venous Thromboembolism) Risk  Recent Flowsheet Documentation  Taken 7/17/2025 1600 by Rosmery Mccarty RN  VTE Prevention/Management:   right   SCDs (sequential compression devices) off   left   foot pump device on  Taken 7/17/2025 1145 by Rosmery Mccarty RN  VTE Prevention/Management:   right   SCDs (sequential compression devices) off   left   foot pump device on  Intervention: Prevent Infection  Recent Flowsheet Documentation  Taken 7/17/2025 1600 by Rosmery Mccarty RN  Infection Prevention:   hand hygiene promoted   rest/sleep promoted  Taken 7/17/2025 1145 by Rosmery Mccarty RN  Infection Prevention:   hand hygiene promoted   rest/sleep promoted  Goal: Optimal Comfort and Wellbeing  Outcome: Progressing  Intervention: Monitor Pain and Promote Comfort  Recent Flowsheet Documentation  Taken 7/17/2025 1600 by Rosmery Mccarty RN  Pain Management Interventions:   pillow support provided   position adjusted  Taken 7/17/2025 1410 by Rosmery Mccarty RN  Pain Management Interventions: pain medication given  Taken 7/17/2025 1145 by Rosmery Mccarty RN  Pain Management Interventions:   pillow support provided   position adjusted  Intervention: Provide Person-Centered Care  Recent Flowsheet Documentation  Taken 7/17/2025 1600 by Rosmery Mccarty RN  Trust Relationship/Rapport:   care explained   choices provided   emotional support provided   questions answered   thoughts/feelings  acknowledged  Taken 7/17/2025 1145 by Rosmery Mccarty RN  Trust Relationship/Rapport:   care explained   choices provided   emotional support provided   questions answered   thoughts/feelings acknowledged  Goal: Readiness for Transition of Care  Outcome: Progressing     Problem: Skin Injury Risk Increased  Goal: Skin Health and Integrity  Outcome: Progressing  Intervention: Optimize Skin Protection  Recent Flowsheet Documentation  Taken 7/17/2025 1700 by Rosmery Mccarty RN  Head of Bed (HOB) Positioning: HOB at 30 degrees  Taken 7/17/2025 1600 by Rosmery Mccarty RN  Pressure Reduction Techniques:   heels elevated off bed   positioned off wounds   pressure points protected   weight shift assistance provided  Pressure Reduction Devices:   positioning supports utilized   pressure-redistributing mattress utilized  Skin Protection:   incontinence pads utilized   pulse oximeter probe site changed  Taken 7/17/2025 1500 by Rosmery Mccarty RN  Activity Management: bedrest  Head of Bed (HOB) Positioning: HOB at 30 degrees  Taken 7/17/2025 1300 by Rosmery Mccarty RN  Activity Management: bedrest  Head of Bed (HOB) Positioning: HOB at 30 degrees  Taken 7/17/2025 1145 by Rosmery Mccarty RN  Activity Management: bedrest  Pressure Reduction Techniques:   heels elevated off bed   positioned off wounds   pressure points protected   weight shift assistance provided  Head of Bed (HOB) Positioning: HOB at 30 degrees  Pressure Reduction Devices:   positioning supports utilized   pressure-redistributing mattress utilized  Skin Protection:   incontinence pads utilized   pulse oximeter probe site changed   Goal Outcome Evaluation:

## 2025-07-17 NOTE — ANESTHESIA PROCEDURE NOTES
Intra-Op Anesthesia [facility]    Procedure Performed: Intra-Op Anesthesia [facility]       Start Time:  7/17/2025 7:25 AM       End Time:   7/17/2025 7:30 AM    Preanesthesia Checklist:  Patient identified, IV assessed, risks and benefits discussed, monitors and equipment assessed, procedure being performed at surgeon's request and anesthesia consent obtained.    General Procedure Information  Diagnostic Indications for Echo:  assessment of ascending aorta, assessment of surgical repair, defect repair evaluation and hemodynamic monitoring  Location performed:  OR  Intubated  Bite block placed  Heart visualized  Probe Insertion:  Easy  Probe Type:  Multiplane  Modalities:  Color flow mapping, continuous wave Doppler and pulse wave Doppler    Echocardiographic and Doppler Measurements    Ventricles    Right Ventricle:  Cavity size normal.  Hypertrophy not present.  Thrombus not present.  Global function normal.    Left Ventricle:  Cavity size normal.  Thrombus not present.  Global Function mildly impaired.  Ejection Fraction 45%.          Valves    Aortic Valve:  Annulus normal.  Stenosis not present.  Regurgitation absent.  Leaflets normal.  Leaflet motions normal.      Mitral Valve:  Annulus normal.  Stenosis not present.  Regurgitation trace.  Leaflets normal.  Leaflet motions normal.      Tricuspid Valve:  Annulus normal.  Stenosis not present.  Regurgitation trace.  Leaflets normal.  Leaflet motions normal.    Pulmonic Valve:  Annulus normal.        Aorta    Ascending Aorta:  Size normal.  Dissection not present.  Plaque thickness less than 3 mm.  Mobile plaque not present.    Aortic Arch:  Size normal.  Dissection not present.  Plaque thickness less than 3 mm.  Mobile plaque not present.    Descending Aorta:  Size normal.  Dissection not present.  Plaque thickness less than 3 mm.  Mobile plaque not present.        Atria    Right Atrium:  Size normal.  Spontaneous echo contrast not present.  Thrombus not present.  Tumor not  present.  Device not present.      Left Atrium:  Size normal.  Spontaneous echo contrast not present.  Thrombus not present.  Tumor not present.  Device not present.    Left atrial appendage normal.      Septa        Ventricular Septum:  Intra-ventricular septum morphology normal.          Other Findings  Pericardium:  normal  Pleural Effusion:  none  Pulmonary Arteries:  normal  Pulmonary Venous Flow:  normal    Anesthesia Information  Performed Personally  Anesthesiologist:  Nikolas Honeycutt MD

## 2025-07-17 NOTE — CONSULTS
CARDIOLOGY CONSULT      Referring Provider: Berto Yung, *    Reason for Consultation: Cardiovascular management post CABG      Patient Care Team:  Kwadwo Tovar MD as PCP - General (Family Medicine)  Leonard Richardson MD as Cardiologist (Cardiology)      SUBJECTIVE     Chief Complaint: Here for CABG    History of present illness:  Clark Randolph is a 39 y.o. male with extensive history of premature coronary disease with PCI to LAD in 2017, hypertension, hyperlipidemia, tobacco use who presented with unstable angina.  Cardiac catheterization showed worsening disease in the LAD including ISR of the stent and diagonal involvement.  He was referred for CABG.  He is now status post CABG X 3 on 7/17/2025  Cardiology has been consulted for perioperative cardiac management.     Home medications included Plavix, atorvastatin, losartan, Coreg and doxazosin.    Review of systems:  Unable to obtain    Personal History:      Past Medical History:   Diagnosis Date    Coronary artery disease     Hyperlipidemia     Hypertension     Myocardial infarction 09/15/2017       Past Surgical History:   Procedure Laterality Date    CARDIAC CATHETERIZATION Right 7/3/2025    Procedure: Left Heart Cath with Coronary Angiography;  Surgeon: Leonard Richardson MD;  Location: [facility] CATH INVASIVE LOCATION;  Service: Cardiovascular;  Laterality: Right;    CORONARY STENT PLACEMENT  09/2017    NO PAST SURGERIES         Family History   Problem Relation Age of Onset    Heart disease Mother     Heart disease Father     Heart failure Father     No Known Problems Sister     Heart disease Brother     Heart failure Brother     Heart attack Brother     No Known Problems Maternal Aunt     No Known Problems Maternal Uncle     No Known Problems Paternal Aunt     No Known Problems Paternal Uncle     No Known Problems Maternal Grandmother     Heart disease Maternal Grandfather     Heart failure Maternal Grandfather     No Known Problems Paternal  Grandmother     No Known Problems Paternal Grandfather     No Known Problems Other     Anemia Neg Hx     Arrhythmia Neg Hx     Asthma Neg Hx     Clotting disorder Neg Hx     Fainting Neg Hx     Hyperlipidemia Neg Hx     Hypertension Neg Hx        Social History     Tobacco Use    Smoking status: Every Day     Current packs/day: 0.50     Average packs/day: 0.5 packs/day for 15.0 years (7.5 ttl pk-yrs)     Types: Cigarettes     Passive exposure: Current    Smokeless tobacco: Never   Vaping Use    Vaping status: Never Used   Substance Use Topics    Alcohol use: No    Drug use: Yes     Types: Marijuana        Home meds:  Prior to Admission medications    Medication Sig Start Date End Date Taking? Authorizing Provider   atorvastatin (LIPITOR) 40 MG tablet Take 1 tablet by mouth Every Night.    Lauro Toledo MD   carvedilol (COREG) 3.125 MG tablet Take 1 tablet by mouth Daily.    Lauro Toledo MD   clopidogrel (PLAVIX) 75 MG tablet Take 1 tablet by mouth Daily. 30 days only needs an appointment for more refills. 10/14/24   Leonard Richardson MD   doxazosin (CARDURA) 2 MG tablet Take 1 tablet by mouth every night at bedtime. 6/2/25   Lauro Toledo MD   gabapentin (NEURONTIN) 400 MG capsule Take 1 capsule by mouth 3 times a day. 5/22/25   Lauro Toledo MD   LORazepam (ATIVAN) 0.5 MG tablet Take 1 tablet by mouth 2 (Two) Times a Day. 7/26/19   Lauro Toledo MD   losartan (COZAAR) 100 MG tablet Take 1 tablet by mouth Daily. 7/24/19   Lauro Toledo MD   mupirocin (BACTROBAN) 2 % ointment Apply to nares (inside each nostril) twice daily as directed for procedure 7/14/25   Berto Yung MD   nitroglycerin (NITROSTAT) 0.4 MG SL tablet 1 under the tongue as needed for angina, may repeat q5mins for up three doses 6/27/25   Pita Hooker APRN   pantoprazole (PROTONIX) 40 MG EC tablet Take 1 tablet by mouth Daily.    Lauro Toledo MD   traMADol (ULTRAM) 50 MG tablet  Take 2 tablets by mouth 2 (Two) Times a Day. 7/13/19   Provider, MD Lauro       Allergies:     Metoprolol    Scheduled Meds:acetaminophen, 1,000 mg, Intravenous, Q8H  [START ON 7/18/2025] aspirin, 81 mg, Oral, Daily  aspirin, 325 mg, Oral, Once  [START ON 7/18/2025] atorvastatin, 40 mg, Oral, Nightly  ceFAZolin, 2 g, Intravenous, Q8H  [START ON 7/18/2025] enoxaparin sodium, 40 mg, Subcutaneous, Q12H  gabapentin, 400 mg, Oral, TID  magnesium sulfate, 1 g, Intravenous, Q8H  mupirocin, 1 Application, Each Nare, BID  [START ON 7/18/2025] pantoprazole, 40 mg, Oral, Q AM  pantoprazole, 40 mg, Intravenous, Once  [START ON 7/18/2025] polyethylene glycol, 17 g, Oral, BID  senna-docusate sodium, 2 tablet, Oral, BID      Continuous Infusions:dexmedetomidine, 0.2-1.5 mcg/kg/hr, Last Rate: 0.5 mcg/kg/hr (07/17/25 1230)  insulin, 0-100 Units/hr  niCARdipine, 5-15 mg/hr, Last Rate: 5 mg/hr (07/17/25 1335)  nitroglycerin, 5-50 mcg/min  norepinephrine, 0.02-0.06 mcg/kg/min  sodium chloride, 30 mL/hr      PRN Meds:  [START ON 7/18/2025] acetaminophen **OR** [START ON 7/18/2025] acetaminophen **OR** [START ON 7/18/2025] acetaminophen    albumin human    Calcium Replacement - Follow Nurse / BPA Driven Protocol    [START ON 7/18/2025] cyclobenzaprine    dextrose    dextrose    glucagon (human recombinant)    HYDROcodone-acetaminophen    Magnesium Cardiology Dose Replacement - Follow Nurse / BPA Driven Protocol    meperidine    Morphine **AND** naloxone    niCARdipine    nitroglycerin    nitroglycerin    norepinephrine    ondansetron    Phosphorus Replacement - Follow Nurse / BPA Driven Protocol    Potassium Replacement - Follow Nurse / BPA Driven Protocol    sodium chloride    vasopressin      OBJECTIVE    Vital Signs  Vitals:    07/17/25 1300 07/17/25 1315 07/17/25 1330 07/17/25 1335   BP: 105/51      BP Location: Other (Comment)      Patient Position: Lying      Pulse: 80 79 80 79   Resp: 14      Temp: 97.2 °F (36.2 °C) 97.2 °F  "(36.2 °C) 97.2 °F (36.2 °C) 97.3 °F (36.3 °C)   TempSrc: Core      SpO2: 99% 98% 98% 97%   Weight:       Height:           Flowsheet Rows      Flowsheet Row First Filed Value   Admission Height 176.5 cm (69.49\") Documented at 07/17/2025 1145   Admission Weight 128 kg (282 lb 10.1 oz) Documented at 07/17/2025 0600              Intake/Output Summary (Last 24 hours) at 7/17/2025 1346  Last data filed at 7/17/2025 1315  Gross per 24 hour   Intake 2120 ml   Output 4950 ml   Net -2830 ml        Telemetry: Sinus rhythm    Physical Exam:  The patient is intubated, sedated.  Obese  Vital signs as noted above.  Head and neck revealed no carotid bruits or jugular venous distention.  No thyromegaly or lymphadenopathy is present  Lungs clear.  No wheezing.  Breath sounds are normal bilaterally.  Heart: Normal first and second heart sounds. No murmur.  No precordial rub is present.  No gallop is present.  Abdomen: Soft and nontender.  No organomegaly is present.  Extremities with good peripheral pulses without any pedal edema.  Skin: Warm and dry.  Musculoskeletal system is grossly normal.  CNS unable to assess      Results Review:  I have personally reviewed the results from the time of this admission to 7/17/2025 13:46 EDT and agree with these findings:  []  Laboratory  []  Microbiology  []  Radiology  []  EKG/Telemetry   []  Cardiology/Vascular   []  Pathology  []  Old records  []  Other:    Most notable findings include:     Lab Results (last 24 hours)       Procedure Component Value Units Date/Time    Blood Gas, Arterial - [[id number]] Collected: 07/17/25 1313    Specimen: Arterial Blood Updated: 07/17/25 1316     Site Arterial Line     Jeronimo's Test N/A     pH, Arterial 7.404 pH units      pCO2, Arterial 43.5 mm Hg      pO2, Arterial 90.6 mm Hg      HCO3, Arterial 27.2 mmol/L      Base Excess, Arterial 2.1 mmol/L      Comment: Serial Number: [id number]:  [id number]        O2 Saturation, Arterial 97.0 %      Barometric Pressure " for Blood Gas --     Comment: N/A        Modality Adult Vent     FIO2 50 %      Ventilator Mode AC     Set Tidal Volume 700     PEEP 8     Hemodilution No     Respiratory Rate 16     PO2/FIO2 181    POC Glucose Once [[id number]]  (Abnormal) Collected: 07/17/25 1313    Specimen: Arterial Blood Updated: 07/17/25 1316     Glucose 111 mg/dL      Comment: Serial Number: [id number]:  [id number]       Renal Function Panel [[id number]]  (Abnormal) Collected: 07/17/25 1230    Specimen: Blood Updated: 07/17/25 1303     Glucose 104 mg/dL      BUN 12.2 mg/dL      Creatinine 1.10 mg/dL      Sodium 142 mmol/L      Potassium 4.8 mmol/L      Chloride 106 mmol/L      CO2 24.5 mmol/L      Calcium 9.4 mg/dL      Albumin 4.7 g/dL      Phosphorus 3.4 mg/dL      Anion Gap 11.5 mmol/L      BUN/Creatinine Ratio 11.1     eGFR 87.0 mL/min/1.73     Narrative:      GFR Categories in Chronic Kidney Disease (CKD)              GFR Category          GFR (mL/min/1.73)    Interpretation  G1                    90 or greater        Normal or high (1)  G2                    60-89                Mild decrease (1)  G3a                   45-59                Mild to moderate decrease  G3b                   30-44                Moderate to severe decrease  G4                    15-29                Severe decrease  G5                    14 or less           Kidney failure    (1)In the absence of evidence of kidney disease, neither GFR category G1 or G2 fulfill the criteria for CKD.    eGFR calculation 2021 CKD-EPI creatinine equation, which does not include race as a factor    Protime-INR [[id number]]  (Abnormal) Collected: 07/17/25 1230    Specimen: Blood Updated: 07/17/25 1248     Protime 16.2 Seconds      INR 1.30    aPTT [[id number]]  (Normal) Collected: 07/17/25 1230    Specimen: Blood Updated: 07/17/25 1248     PTT 35.2 seconds     CBC & Differential [[id number]]  (Abnormal) Collected: 07/17/25 1230    Specimen: Blood Updated: 07/17/25 1239    Narrative:       The following orders were created for panel order CBC & Differential.  Procedure                               Abnormality         Status                     ---------                               -----------         ------                     CBC Auto Differential[[id number]]        Abnormal            Final result                 Please view results for these tests on the individual orders.    CBC Auto Differential [[id number]]  (Abnormal) Collected: 07/17/25 1230    Specimen: Blood Updated: 07/17/25 1239     WBC 15.14 10*3/mm3      RBC 4.32 10*6/mm3      Hemoglobin 12.7 g/dL      Hematocrit 37.1 %      MCV 85.9 fL      MCH 29.4 pg      MCHC 34.2 g/dL      RDW 12.4 %      RDW-SD 39.0 fl      MPV 9.6 fL      Platelets 150 10*3/mm3      Neutrophil % 78.1 %      Lymphocyte % 11.0 %      Monocyte % 9.5 %      Eosinophil % 0.4 %      Basophil % 0.5 %      Immature Grans % 0.5 %      Neutrophils, Absolute 11.82 10*3/mm3      Lymphocytes, Absolute 1.66 10*3/mm3      Monocytes, Absolute 1.44 10*3/mm3      Eosinophils, Absolute 0.06 10*3/mm3      Basophils, Absolute 0.08 10*3/mm3      Immature Grans, Absolute 0.08 10*3/mm3      nRBC 0.0 /100 WBC     Calcium, Ionized [[id number]]  (Abnormal) Collected: 07/17/25 1230    Specimen: Blood Updated: 07/17/25 1238     Ionized Calcium 1.14 mmol/L     Blood Gas, Arterial - [[id number]]  (Abnormal) Collected: 07/17/25 1200    Specimen: Arterial Blood Updated: 07/17/25 1203     Site Arterial Line     Jeronimo's Test N/A     pH, Arterial 7.343 pH units      pCO2, Arterial 52.2 mm Hg      pO2, Arterial 109.7 mm Hg      HCO3, Arterial 28.4 mmol/L      Base Excess, Arterial 1.9 mmol/L      Comment: Serial Number: [id number]:  [id number]        O2 Saturation, Arterial 97.9 %      Barometric Pressure for Blood Gas --     Comment: N/A        Modality Adult Vent     FIO2 70 %      Ventilator Mode AC     Set Tidal Volume 700     PEEP 8     Hemodilution No     Respiratory Rate 14     PO2/FIO2 157     POC Glucose Once [[id number]]  (Abnormal) Collected: 07/17/25 1200    Specimen: Arterial Blood Updated: 07/17/25 1203     Glucose 103 mg/dL      Comment: Serial Number: [id number]:  [id number]       POC Activated Clotting Time [[id number]]  (Normal) Collected: 07/17/25 1035    Specimen: Blood Updated: 07/17/25 1052     Activated Clotting Time  124 Seconds      Comment: Serial Number: [id number]:  [id number]       POC Chem 8, arterial (ISTAT) [[id number]]  (Abnormal) Collected: 07/17/25 1022    Specimen: Arterial Blood Updated: 07/17/25 1052     Ionized Calcium 1.86 mmol/L      pH, Arterial 7.300 pH units      pCO2, Arterial 52.8 mm Hg      pO2, Arterial 335.0 mm Hg      HCO3, Arterial 26.2 mmol/L      Base Excess, Arterial 0.0 mmol/L      Base Deficit --     O2 Saturation, Arterial 100.0 %      Glucose 148 mg/dL      Comment: Serial Number: [id number]:  [id number]        Sodium 134 mmol/L      POC Potassium 6.7 mmol/L      Hematocrit 33 %      Hemoglobin 11.2 g/dL      CO2 Content 28 mmol/L     POC Chem 8, arterial (ISTAT) [[id number]]  (Abnormal) Collected: 07/17/25 1002    Specimen: Arterial Blood Updated: 07/17/25 1052     Ionized Calcium 1.08 mmol/L      pH, Arterial 7.270 pH units      pCO2, Arterial 59.4 mm Hg      pO2, Arterial 358.0 mm Hg      HCO3, Arterial 27.4 mmol/L      Base Excess, Arterial 1.0 mmol/L      Base Deficit --     O2 Saturation, Arterial 100.0 %      Glucose 167 mg/dL      Comment: Serial Number: [id number]:  [id number]        Sodium 135 mmol/L      POC Potassium 6.6 mmol/L      Hematocrit 35 %      Hemoglobin 11.9 g/dL      CO2 Content 29 mmol/L     POC Chem 8, arterial (ISTAT) [[id number]]  (Abnormal) Collected: 07/17/25 0946    Specimen: Arterial Blood Updated: 07/17/25 1051     Ionized Calcium 1.07 mmol/L      pH, Arterial 7.270 pH units      pCO2, Arterial 62.5 mm Hg      pO2, Arterial 374.0 mm Hg      HCO3, Arterial 28.5 mmol/L      Base Excess, Arterial 2.0 mmol/L      Base Deficit --      O2 Saturation, Arterial 100.0 %      Glucose 160 mg/dL      Comment: Serial Number: [id number]:  [id number]        Sodium 135 mmol/L      POC Potassium 6.7 mmol/L      Hematocrit 33 %      Hemoglobin 11.2 g/dL      CO2 Content 30 mmol/L     POC Chem 8, arterial (ISTAT) [[id number]]  (Abnormal) Collected: 07/17/25 0926    Specimen: Arterial Blood Updated: 07/17/25 1051     Ionized Calcium 1.08 mmol/L      pH, Arterial 7.240 pH units      pCO2, Arterial 64.4 mm Hg      pO2, Arterial 384.0 mm Hg      HCO3, Arterial 27.4 mmol/L      Base Excess, Arterial 0.0 mmol/L      Base Deficit --     O2 Saturation, Arterial 100.0 %      Glucose 143 mg/dL      Comment: Serial Number: [id number]:  [id number]        Sodium 133 mmol/L      POC Potassium 5.9 mmol/L      Hematocrit 32 %      Hemoglobin 10.9 g/dL      CO2 Content 29 mmol/L     POC Chem Panel [[id number]]  (Abnormal) Collected: 07/17/25 0922    Specimen: Venous Blood Updated: 07/17/25 1051     Glucose 136 mg/dL      Comment: Serial Number: [id number]:  [id number]        Sodium 134 mmol/L      POC Potassium 5.7 mmol/L      Ionized Calcium 1.07 mmol/L      Hematocrit 31 %      Hemoglobin 10.5 g/dL      pH, Venous 7.230 pH Units      pCO2, Venous 67.0 mm Hg      CO2 CONTENT  30 mmol/L      HCO3, Venous 28.3 mmol/L      Base Excess, Venous 1.0 mmol/L      Base Deficit --     O2 Saturation, Venous --     pO2, Venous 45.0 mm Hg     POC Activated Clotting Time [[id number]]  (Abnormal) Collected: 07/17/25 0901    Specimen: Arterial Blood Updated: 07/17/25 1051     Activated Clotting Time  504 Seconds      Comment: Serial Number: [id number]:  [id number]       POC Activated Clotting Time [[id number]]  (Abnormal) Collected: 07/17/25 1007    Specimen: Arterial Blood Updated: 07/17/25 1050     Activated Clotting Time  458 Seconds      Comment: Serial Number: [id number]:  [id number]       POC Activated Clotting Time [[id number]]  (Abnormal) Collected: 07/17/25 0950    Specimen: Arterial  Blood Updated: 07/17/25 1050     Activated Clotting Time  383 Seconds      Comment: Serial Number: [id number]:  [id number]       POC Chem 8, arterial (ISTAT) [[id number]]  (Abnormal) Collected: 07/17/25 0730    Specimen: Arterial Blood Updated: 07/17/25 1050     Ionized Calcium 1.22 mmol/L      pH, Arterial 7.260 pH units      pCO2, Arterial 59.1 mm Hg      pO2, Arterial 500.0 mm Hg      HCO3, Arterial 26.6 mmol/L      Base Excess, Arterial 0.0 mmol/L      Base Deficit --     O2 Saturation, Arterial 100.0 %      Glucose 118 mg/dL      Comment: Serial Number: [id number]:  [id number]        Sodium 139 mmol/L      POC Potassium 4.4 mmol/L      Hematocrit 39 %      Hemoglobin 13.3 g/dL      CO2 Content 28 mmol/L     POC Chem 8, arterial (ISTAT) [[id number]]  (Abnormal) Collected: 07/17/25 1034    Specimen: Arterial Blood Updated: 07/17/25 1049     Ionized Calcium 1.44 mmol/L      pH, Arterial 7.280 pH units      pCO2, Arterial 55.8 mm Hg      pO2, Arterial 129.0 mm Hg      HCO3, Arterial 25.9 mmol/L      Base Excess, Arterial <0.0 mmol/L      Base Deficit --     O2 Saturation, Arterial 98.0 %      Glucose 139 mg/dL      Comment: Serial Number: [id number]:  [id number]        Sodium 137 mmol/L      POC Potassium 5.7 mmol/L      Hematocrit 33 %      Hemoglobin 11.2 g/dL      CO2 Content 28 mmol/L     POC Activated Clotting Time [[id number]]  (Abnormal) Collected: 07/17/25 0926    Specimen: Venous Blood Updated: 07/17/25 1049     Activated Clotting Time  383 Seconds      Comment: Serial Number: [id number]:  [id number]       POC Activated Clotting Time [[id number]]  (Normal) Collected: 07/17/25 0731    Specimen: Arterial Blood Updated: 07/17/25 1049     Activated Clotting Time  135 Seconds      Comment: Serial Number: [id number]:  [id number]       POC Glucose Once [[id number]]  (Abnormal) Collected: 07/17/25 0618    Specimen: Blood Updated: 07/17/25 0621     Glucose 109 mg/dL      Comment: Serial Number: [id number]:   [id number]               Imaging Results (Last 24 Hours)       Procedure Component Value Units Date/Time    XR Chest 1 View [[id number]] Collected: 07/17/25 1301     Updated: 07/17/25 1305    Narrative:      XR CHEST 1 VW    Date of Exam: 7/17/2025 12:34 PM EDT    Indication: Post-Op Check Line & Tube Placement    Comparison: Chest radiograph 7/15/2025    Findings:  ET tube 5.6 cm above santo terminating over upper third thoracic trachea. Gastric tube is below diaphragm. Median sternotomy and CABG. Right IJ approach [place] catheter terminates over distal right pulmonary artery. Cardiac silhouette mildly   enlarged. Lung volumes are low with central bronchovascular crowding. Platelike atelectasis in the left lower lobe. No significant edema, large effusion or pneumothorax. Thoracotomy tube noted.      Impression:      Impression:  1. Medical support devices as above. No pneumothorax.  2. Low lung volumes with central bronchovascular crowding and left basilar atelectasis.      Electronically Signed: Tomy Ruiz MD    7/17/2025 1:02 PM EDT    Workstation ID: [id number]            LAB RESULTS (LAST 7 DAYS)    CBC  Results from last 7 days   Lab Units 07/17/25  1230 07/17/25  1034 07/17/25  1022 07/17/25  1002 07/17/25  0946 07/17/25  0926 07/17/25  0922 07/17/25  0730 07/15/25  0823   WBC 10*3/mm3 15.14*  --   --   --   --   --   --   --  9.76   RBC 10*6/mm3 4.32  --   --   --   --   --   --   --  5.12   HEMOGLOBIN g/dL 12.7*  --   --   --   --   --   --   --  14.9   HEMOGLOBIN, POC g/dL  --  11.2* 11.2* 11.9* 11.2* 10.9* 10.5*   < >  --    HEMATOCRIT % 37.1*  --   --   --   --   --   --   --  44.7   HEMATOCRIT POC %  --  33* 33* 35* 33* 32* 31*   < >  --    MCV fL 85.9  --   --   --   --   --   --   --  87.3   PLATELETS 10*3/mm3 150  --   --   --   --   --   --   --  249    < > = values in this interval not displayed.       BMP  Results from last 7 days   Lab Units 07/17/25  1230 07/15/25  0823   SODIUM mmol/L 142 139    POTASSIUM mmol/L 4.8 4.2   CHLORIDE mmol/L 106 104   CO2 mmol/L 24.5 26.9   BUN mg/dL 12.2 14.2   CREATININE mg/dL 1.10 0.98   GLUCOSE mg/dL 104* 111*   PHOSPHORUS mg/dL 3.4  --        CMP   Results from last 7 days   Lab Units 07/17/25  1230 07/15/25  0823   SODIUM mmol/L 142 139   POTASSIUM mmol/L 4.8 4.2   CHLORIDE mmol/L 106 104   CO2 mmol/L 24.5 26.9   BUN mg/dL 12.2 14.2   CREATININE mg/dL 1.10 0.98   GLUCOSE mg/dL 104* 111*   ALBUMIN g/dL 4.7 4.2   BILIRUBIN mg/dL  --  0.4   ALK PHOS U/L  --  122*   AST (SGOT) U/L  --  20   ALT (SGPT) U/L  --  29       BNP        TROPONIN        CoAg  Results from last 7 days   Lab Units 07/17/25  1230 07/15/25  0823   INR  1.30* 0.95   APTT seconds 35.2 30.3       Creatinine Clearance  Estimated Creatinine Clearance: 120.1 mL/min (by C-G formula based on SCr of 1.1 mg/dL).    ABG  Results from last 7 days   Lab Units 07/17/25  1313 07/17/25  1200 07/17/25  1034 07/17/25  1022 07/17/25  1002 07/17/25  0946 07/17/25  0926   PH, ARTERIAL pH units 7.404 7.343* 7.280* 7.300* 7.270* 7.270* 7.240*   PCO2, ARTERIAL mm Hg 43.5 52.2* 55.8* 52.8* 59.4* 62.5* 64.4*   PO2 ART mm Hg 90.6 109.7* 129.0* 335.0* 358.0* 374.0* 384.0*   O2 SATURATION ART % 97.0 97.9 98.0 100.0* 100.0* 100.0* 100.0*   BASE EXCESS ART mmol/L 2.1 1.9 <0.0* 0.0 1.0 2.0 0.0         Radiology  XR Chest 1 View  Result Date: 7/17/2025  Impression: 1. Medical support devices as above. No pneumothorax. 2. Low lung volumes with central bronchovascular crowding and left basilar atelectasis. Electronically Signed: Tomy Ruiz MD  7/17/2025 1:02 PM EDT  Workstation ID: [id number]        EKG  I personally viewed and interpreted the patient's EKG/Telemetry data:  ECG 12 Lead Other; s/p CABG   Preliminary Result   HEART RATE=73  bpm   RR Swbaepoj=308  ms   AR Rbbhjlzm=350  ms   P Horizontal Axis=34  deg   P Front Axis=28  deg   QRSD Interval=92  ms   QT Hmmurgqt=780  ms   PJoN=177  ms   QRS Axis=8  deg   T Wave Axis=-16   deg   - ABNORMAL ECG -   Sinus rhythm   Anterior infarct, old   Estimated MI size: 24%   Date and Time of Study:2025-07-17 12:46:16      Telemetry Scan   Final Result      Telemetry Scan   Final Result            Echocardiogram:    Results for orders placed during the hospital encounter of 07/03/25    Adult Transthoracic Echo Complete W/ Cont if Necessary Per Protocol 07/03/2025 1507    Interpretation Summary    Left ventricular systolic function is mildly decreased. Left ventricular ejection fraction appears to be 46 - 50%.    Left ventricular diastolic function was normal.    Severe hypokinesis of the mid to distal septal and distal anterior wall and apex.        Stress Test:        Cardiac Catheterization:  Results for orders placed during the hospital encounter of 07/03/25    Cardiac Catheterization/Vascular Study    Conclusion  :  1. Leonard Richardson M.D., Attending Cardiologist      PROCEDURE PERFORMED.  Ultrasound guided vascular access  Left heart catheterization  Coronary Angiogram  Moderate Sedation    INDICATIONS FOR PROCEDURE.  39 years old man with known coronary disease and previous PCI presents with signs and symptoms consistent with unstable angina.  After discussing the risk and benefits of the procedure he was offered coronary angiography.      PROCEDURE IN DETAIL.  Informed consent was obtained from the patient after explaining the risks, benefits, and alternative options of the procedure. After obtaining informed consent, the patient was brought to the cath lab and was prepped in a sterile fashion. Lidocaine 1% was used for local anesthesia into the right radial access site. The right radial artery was accessed under direct ultrasound visualization  with an angiocath needle via modified Seldinger technique. A 6F slender sheath was inserted successfully. Afterwards, 6F JR4  was advanced over a wire into the ascending aorta and used to cross the AV and obtain LV pressures.  AV gradient obtained  via pullback technique. JR4 and JL3.5 diagnostic catheters were used to engage the ostia of the RCA and LM respectively. Images of the right and left coronary systems were obtained. All the catheters were exchanged over a wire and subsequently removed. The patient tolerated the procedure well without any complications. The pictures were reviewed at the end of the procedure. TR band applied to right wrist for hemostasis and inflated with 15 cc of air. No complications were encountered.    HEMODYNAMICS.  LV: 122/6, 70 mmHg  AO: 131/87, 105 mmHg  No significant gradient across the aortic valve during pullback of JR4 catheter.  LV gram was not performed due to recent echocardiogram.    FINDINGS.    Coronary Angiogram.    1. Left main. Left main is a large-caliber vessel which gives rise to the Left Anterior Descending and the Left circumflex.  Left main is angiographically free from any significant disease    2. Left Anterior Descending Artery. LAD is a large vessel which gives rise to several septal perforators and several diagonal branches.  Proximal LAD has 60% stenosis at the origin of first diagonal branch.  Mid LAD has a stent with 70 to 80% in-stent restenosis.  There is a jailed diagonal 2 with ostial 80-90 % stenosis.    3. Left Circumflex. The LCx is a medium caliber which gives rise to marginals.  Left circumflex artery has diffuse nonobstructive 10 to 20% disease.    4. Right Coronary Artery. The RCA is a dominant vessel which gives rise to several small caliber branches including PDA and PLV.  Proximal RCA has 20%, mid RCA has 30 to 40% and distal RCA has 60% stenosis in the PLV.    IMPRESSIONS.  1.  Multivessel coronary artery disease as described above including significant ISR of mid LAD stent involving diagonals  2.  Mildly elevated LVEDP    RECOMMENDATIONS.  1.  Cardiac surgery referral.  2.  Smoking cessation.  3.  Continue risk factors modification    Electronically signed by Leonard Richardson MD,  07/03/25, 11:49 AM EDT.        Other:      ASSESSMENT & PLAN:    Principal Problem:    Coronary heart disease  Active Problems:    CAD (coronary artery disease)    Coronary artery disease  Premature coronary artery disease  History of STEMI and PCI with a 2.5 x 20 mm drug-eluting stent in 2017  CABG x 3, LIMA to LAD, vein graft to diagonal, vein graft to PLV/RCA  Chest tube, pacer, [place] management per cardiac surgery  Likely extubation later today  Lasix as needed  Continue aspirin, statin and beta-blocker  I would like him to be on Plavix long-term  Cardiac rehab referral at the time of discharge     Hypertension  Currently controlled  Will attempt to start losartan and Coreg near discharge     Failure with midrange ejection fraction  Echocardiogram shows EF of 45%, LVIDD 5.9 cm.  RVSP 42 mmHg.  Plan to resume losartan and Coreg     Hyperlipidemia  Continue high intensity statin  LDL 78, HDL 29, triglycerides 126 and total cholesterol 130  Goal LDL less than 55  A1c is 5     Palpitations  Occasional and associated with anxiety  Will resume Coreg     Tobacco abuse  Smoking cessation counseling provided to the patient  He has cut down but continues to smoke     Obesity  BMI is 41.8.  He weighs 286 pounds.  Lifestyle modifications recommended to the patient.  Obesity complicates all aspects of his care    Leonard Richardson MD  07/17/25  13:46 EDT

## 2025-07-17 NOTE — OP NOTE
Operative Note    Date of Dictation: 07/17/25    Date of Procedure: 07/17/25    Referring Physician: Berto Yung,*    Preoperative diagnosis: Multivessel CAD    Postoperative diagnosis: Same    Procedure:   1. CABG x 3 (LIMA to LAD, SVG to Diagona, SVG to PL of RCA)  2. EVH of the left legs    Surgeon: Berto Yung MD     Assistants: FRANKLYN Murray was responsible for performing the following activities: Cardiac Surgery First assist, Endoscopic Vein [place] for CABG, surgical wound closure and their skilled assistance was necessary for the success of this case.     Anesthesia: General endotracheal anesthesia and KIARA    Findings:  The saphenous vein was harvested endoscopically from the left  leg. The vein had a diameter of 4 mm and was of good quality. The coronaries had a diameter of 1.5 mm and were of fair quality.      Estimated Blood Loss:  500 mL of Cell Saver returned.    STS Data: The STS Risk score discussed with the patient and family.  Counseling was done regarding abuse of tobacco, alcohol and drugs as needed. They understand and wish to proceed. The antibiotics and b blockers were given in the STS required window.          Description of the procedure:     The patient was placed supine on the operative table. General anesthesia was given and lines placed. The patient was prepped and draped using the usual sterile technique. A median sternotomy was performed with a scalpel and the layers carried down to the sternum using the electrocautery. The sternum was split in the midline using a vertical oscillating saw. Hemostasis was achieved. The LIMA was harvested skeletonized and prepared with papaverine. It was of good quality. Then 300 units/kg of IV heparin was given to an ACT over 400. A Favaloro retractor was placed and the mediastinum exposed, the pericardium was opened and the edges tacked to the wound. Cannulation sutures were placed in the ascending aorta and right atrium. Small  cannulas were placed and aorto right atrial cardiopulmonary bypass was started. Cardioplegia cannulas were placed. Cardiopulmonary bypass was then established for 72 minutes drifting to 34°C at appropriate flow rates.  The aorta was crossclamped for 61 minutes and we gave 1000 mL of antegrade cold blood cardioplegia then 200 L of retrograde cold blood cardioplegia and then repeated doses every 10 to 15 minutes to good effect. 2veins were anastomosed to the ascending aorta with 6-0 Prolene and marked with washers.  The first vein was sewn to the first diagonal with 7-0 Prolene.  The next vein was sewn to posterior ventricular artery branch of the right coronary artery with 7-0 Prolene. The LIMA was sewn to the distal LAD with 7-0 Prolene. A warm dose of cardioplegia was given and then the aortic clamp removed as well as the LIMA bulldog clamp. All anastomoses were checked and had good flow and morphology. The left pleural space was suctioned and the lungs ventilated. The heart was paced until regular atrial rhythm resumed. I allowed the heart to eject and once hemodynamics were acceptable, then the CPB was discontinued and the venous and cardioplegia cannulas removed. The matching dose of protamine was given and the aortic cannula removed as well. AV temporary wires and pleural and mediastinal chest tubes were placed and the wound sprayed with platelet rich plasma. The sternum was closed with single and double wires and soft tissue in layers of reabsorbable material. The wounds were covered with sterile dressings.       Specimen removed:  none    CPB time:  72 minutes.    Aortic clamp time:  61 minutes    Complications:  none           Disposition: Cardiovascular recovery room           Condition: Critical but stable.

## 2025-07-17 NOTE — ANESTHESIA PROCEDURE NOTES
Airway  Reason: elective    Date/Time: 7/17/2025 7:07 AM  End Time:7/17/2025 7:07 AM  Airway not difficult    General Information and Staff    Patient location during procedure: OR  Anesthesiologist: Nikolas Honeycutt MD    Indications and Patient Condition  Indications for airway management: airway protection    Preoxygenated: yes  MILS maintained throughout    Mask difficulty assessment: 1 - vent by mask    Final Airway Details    Final airway type: endotracheal airway      Successful airway: ETT  Cuffed: yes   Successful intubation technique: direct laryngoscopy  Endotracheal tube insertion site: oral  Blade: Sarah  Blade size: 4  ETT size (mm): 7.5  Cormack-Lehane Classification: grade I - full view of glottis  Placement verified by: chest auscultation and capnometry   Measured from: teeth  ETT/EBT  to teeth (cm): 24  Number of attempts at approach: 1  Assessment: lips, teeth, and gum same as pre-op and atraumatic intubation    Additional Comments  ATRAUMATIC UDVC. .  TEETH IN PREOP CONDITION.  CUFF TO MINIMUM OCCLUSIVE CUFF PRESSURE. POS ETCO2. BS=BS. ENDO BITE BLOCK

## 2025-07-17 NOTE — ANESTHESIA PROCEDURE NOTES
Central Line      Patient location during procedure: OR  Start time: 7/17/2025 6:45 AM  Stop Time:7/17/2025 6:55 AM  Indications: central pressure monitoring, vascular access and MD/Surgeon request  Staff  Anesthesiologist: Nikolas Honeycutt MD  Preanesthetic Checklist  Completed: patient identified, IV checked, site marked, risks and benefits discussed, surgical consent, monitors and equipment checked, pre-op evaluation and timeout performed  Central Line Prep  Sterile Tech:gloves, cap, gown, mask and sterile barriers  Prep: chloraprep  Patient monitoring: blood pressure monitoring, continuous pulse oximetry and EKG  Central Line Procedure  Laterality:right  Location:internal jugular  Catheter Type:double lumen and MAC  Catheter Size:9 Fr  Guidance:ultrasound guided  PROCEDURE NOTE/ULTRASOUND INTERPRETATION.  Using ultrasound guidance the potential vascular sites for insertion of the catheter were visualized to determine the patency of the vessel to be used for vascular access.  After selecting the appropriate site for insertion, the needle was visualized under ultrasound being inserted into the internal jugular vein, followed by ultrasound confirmation of wire and catheter placement. There were no abnormalities seen on ultrasound; an image was taken; and the patient tolerated the procedure with no complications. Images: still images obtained, printed/placed on chart  Assessment  Post procedure:biopatch applied, line sutured and occlusive dressing applied  Assessement:blood return through all ports, free fluid flow, chest x-ray ordered and Eriberto Test  Complications:no  Patient Tolerance:patient tolerated the procedure well with no apparent complications  Additional Notes  RIJ MAC VIA ASEPTIC SELDINGER TECH THRU ANTERIOR APPROACH US GUIDANCE X1

## 2025-07-17 NOTE — ANESTHESIA PROCEDURE NOTES
Arterial Line      Patient location during procedure: OR  Start time: 7/17/2025 6:39 AM  Stop Time:7/17/2025 6:44 AM       Line placed for hemodynamic monitoring, ABGs/Labs/ISTAT and MD/Surgeon request.  Performed By   Anesthesiologist: Nikolas Honeycutt MD   Preanesthetic Checklist  Completed: patient identified, IV checked, site marked, risks and benefits discussed, surgical consent, monitors and equipment checked, pre-op evaluation and timeout performed  Arterial Line Prep    Sterile Tech: cap, gloves, sterile barriers and mask  Prep: ChloraPrep  Patient monitoring: EKG, continuous pulse oximetry and blood pressure monitoring  Arterial Line Procedure   Laterality:left  Location:  radial artery  Catheter size: 20 G   Guidance: ultrasound guided  PROCEDURE NOTE/ULTRASOUND INTERPRETATION.  Using ultrasound guidance the potential vascular sites for insertion of the catheter were visualized to determine the patency of the vessel to be used for vascular access.  After selecting the appropriate site for insertion, the needle was visualized under ultrasound being inserted into the radial artery, followed by ultrasound confirmation of wire and catheter placement. There were no abnormalities seen on ultrasound; an image was taken; and the patient tolerated the procedure with no complications.   Number of attempts: 1  Successful placement: yes   Post Assessment   Dressing Type: wrist guard applied, secured with tape and occlusive dressing applied.   Complications no  Circ/Move/Sens Assessment: normal and unchanged.   Patient Tolerance: patient tolerated the procedure well with no apparent complications  Additional Notes  L RADIAL ART LINE VIA ASEPTIC SELDINGER TECH US GUIDANCE X1 RNMD ASSIST POS PHILIPPE

## 2025-07-17 NOTE — ANESTHESIA POSTPROCEDURE EVALUATION
Patient: Clark Randolph    Procedure Summary       Date: 07/17/25 Room / Location: [facility] CVOR 02 / [facility] CVOR    Anesthesia Start: 0636 Anesthesia Stop: 1131    Procedure: STERNOTOMY; CORONARY ARTERY BYPASS GRAFTING X 4 USING LEFT INTERNAL MAMMARY ARTERY AND ENDOSCOPIC VEIN HARVEST FROM LEFT LEG; KIARA PER ANESTHESIA; PRP (Chest) Diagnosis:       Coronary artery disease involving native coronary artery of native heart without angina pectoris      (Coronary artery disease involving native coronary artery of native heart without angina pectoris [I25.10])    Surgeons: Berto Yung MD Provider: Nikolas Honeycutt MD    Anesthesia Type: general, [place], CVL, PAC ASA Status: 4            Anesthesia Type: general, [place], CVL, PAC    Vitals  Vitals Value Taken Time   BP     Temp 96.44 °F (35.8 °C) 07/17/25 11:48   Pulse 80 07/17/25 11:48   Resp     SpO2 99 % 07/17/25 11:48   Vitals shown include unfiled device data.        Post Anesthesia Care and Evaluation    Patient location during evaluation: ICU  Patient participation: complete - patient cannot participate  Level of consciousness: obtunded/minimal responses  Pain scale: See nurse's notes for pain score.  Pain management: adequate    Airway patency: patent  Anesthetic complications: No anesthetic complications  PONV Status: none  Cardiovascular status: acceptable  Respiratory status: acceptable, ventilator, ETT and intubated  Hydration status: acceptable    Comments: Patient seen and examined postoperatively; vital signs stable; SpO2 greater than or equal to 90%; cardiopulmonary status stable; nausea/vomiting adequately controlled; pain adequately controlled; no apparent anesthesia complications; patient discharged from anesthesia care when discharge criteria were met

## 2025-07-17 NOTE — PROGRESS NOTES
"S/P POD# 1 CABG x3 with LIMA--[place]  EF 46-50% (echo)    Subjective:  reports surgical pain, hard to take a deep breath, feels something \"shocking me\" (temp pacer turned off now)    Extubated ~2215  Drips:  Cardene 5, insulin  CI 4, CVP 13,   Wt is up 3 kgs from preop  UF -2.7L  CXR:  atel      Intake/Output Summary (Last 24 hours) at 7/18/2025 0825  Last data filed at 7/18/2025 0600  Gross per 24 hour   Intake 5187 ml   Output 6545 ml   Net -1358 ml     Temp:  [96.4 °F (35.8 °C)-98.6 °F (37 °C)] 98.1 °F (36.7 °C)  Heart Rate:  [74-89] 74  Resp:  [12-24] 12  BP: ()/(48-68) 120/55  FiO2 (%):  [40 %-70 %] 40 %  /8    Results from last 7 days   Lab Units 07/18/25  0300 07/17/25  1808 07/17/25  1230 07/17/25  0730 07/15/25  0823   WBC 10*3/mm3 15.07* 14.56* 15.14*  --  9.76   HEMOGLOBIN g/dL 12.4* 11.7* 12.7*  --  14.9   HEMOGLOBIN, POC   --   --   --    < >  --    HEMATOCRIT % 36.5* 34.6* 37.1*  --  44.7   HEMATOCRIT POC   --   --   --    < >  --    PLATELETS 10*3/mm3 161 167 150  --  249   INR  1.21*  --  1.30*  --  0.95    < > = values in this interval not displayed.     Results from last 7 days   Lab Units 07/18/25  0300   CREATININE mg/dL 1.04   POTASSIUM mmol/L 4.3   SODIUM mmol/L 140   MAGNESIUM mg/dL 2.7*   PHOSPHORUS mg/dL 2.6     ica 1.18    Physical Exam:  Neuro intact, nad, up in recliner, mother at bedsdie  Tele:  SR 70s  Diminished bases, 96% 5L  dsg c/d/i, no drainage  Benign abd, no BM, + belching  No edema    Assessment/Plan:  Principal Problem:    Coronary heart disease  Active Problems:    CAD (coronary artery disease)    - Severe MV CAD with ISR of LAD, hx STEMI (2017) EF 45-50% (echo)--s/p CABG x3 with LIMA to LAD, SVG to diagonal, SVG to PL of RCA/ EVH LLE ([place], 7/17)  - Plavix use, preop--last dose 7/12  - Strong family hx of premature CAD   - HTN--Cardene postop  - HLD--statin  - Current tobacco abuse--cessation d/w pt  - Moderate COPD/ restrictive lung " disease--per PFT 7/15  - Morbid obesity, stage 3--BMI 41.1  - GERD--PPI  - Anxiety--lorazepam prn at home  - Postop leukocytosis likely r/t atel/ reactive--aggressive pulm toileting  - Postop ABLA, expected--watch closely    POD# 1. Doing well. Hypertensive on Cardene. DC swan/lucio. On asa/statin, add carvedilol. Diurese. Aggressive pulm toileting. Mobilize. Re-eval chest tubes later today.    Addendum:  Dc chest tubes/velez. Off Cardene. Ambulated twice today.    Routine care--as above  De-escal   D/w pt/family, nsg, Dr. Yung   Anticipate home at discharge    Ariana Carroll, APRN  7/18/2025  08:25 EDT

## 2025-07-17 NOTE — ANESTHESIA PROCEDURE NOTES
Central Line      Patient location during procedure: OR  Start time: 7/17/2025 6:56 AM  Stop Time:7/17/2025 6:58 AM  Indications: central pressure monitoring, vascular access and MD/Surgeon request  Staff  Anesthesiologist: Nikolas Honeycutt MD  Preanesthetic Checklist  Completed: patient identified, IV checked, site marked, risks and benefits discussed, surgical consent, monitors and equipment checked, pre-op evaluation and timeout performed  Central Line Prep  Sterile Tech:cap, gloves, gown, mask and sterile barriers  Prep: chloraprep  Central Line Procedure  Laterality:right  Location:internal jugular  Catheter Type:[place]anna  Assessment  Complications:no  Patient Tolerance:patient tolerated the procedure well with no apparent complications  Additional Notes  THRU PREVIOUSLY PLACED MAC INTRODUCER NEW GLOVES/DRAPE TO PA X1 ATTEMPT 50CM NO WEDGE ATTEMPTED

## 2025-07-18 ENCOUNTER — APPOINTMENT (OUTPATIENT)
Dept: GENERAL RADIOLOGY | Facility: HOSPITAL | Age: 40
DRG: 236 | End: 2025-07-18
Payer: COMMERCIAL

## 2025-07-18 LAB
ALBUMIN SERPL-MCNC: 4.8 G/DL (ref 3.5–5.2)
ANION GAP SERPL CALCULATED.3IONS-SCNC: 11.3 MMOL/L (ref 5–15)
ARTERIAL PATENCY WRIST A: ABNORMAL
ATMOSPHERIC PRESS: ABNORMAL MM[HG]
BASE EXCESS BLDA CALC-SCNC: -2.3 MMOL/L (ref 0–3)
BASE EXCESS BLDA CALC-SCNC: 2.8 MMOL/L (ref 0–3)
BASE EXCESS BLDA CALC-SCNC: 5.1 MMOL/L (ref 0–3)
BASOPHILS # BLD MANUAL: 0.15 10*3/MM3 (ref 0–0.2)
BASOPHILS NFR BLD MANUAL: 1 % (ref 0–1.5)
BDY SITE: ABNORMAL
BUN SERPL-MCNC: 17 MG/DL (ref 6–20)
BUN/CREAT SERPL: 16.3 (ref 7–25)
CA-I BLDA-SCNC: 1.18 MMOL/L (ref 1.15–1.33)
CALCIUM SPEC-SCNC: 9.1 MG/DL (ref 8.6–10.5)
CHLORIDE BLDA-SCNC: 112 MMOL/L (ref 98–109)
CHLORIDE SERPL-SCNC: 107 MMOL/L (ref 98–107)
CO2 BLDA-SCNC: 29.4 MMOL/L (ref 22–29)
CO2 SERPL-SCNC: 21.7 MMOL/L (ref 22–29)
CREAT BLDA-MCNC: 0.92 MG/DL (ref 0.6–1.3)
CREAT SERPL-MCNC: 1.04 MG/DL (ref 0.76–1.27)
DEPRECATED RDW RBC AUTO: 40.7 FL (ref 37–54)
EGFRCR SERPLBLD CKD-EPI 2021: 107.8 ML/MIN/1.73
EGFRCR SERPLBLD CKD-EPI 2021: 93.1 ML/MIN/1.73
ERYTHROCYTE [DISTWIDTH] IN BLOOD BY AUTOMATED COUNT: 12.9 % (ref 12.3–15.4)
GLUCOSE BLDC GLUCOMTR-MCNC: 100 MG/DL (ref 70–105)
GLUCOSE BLDC GLUCOMTR-MCNC: 103 MG/DL (ref 70–105)
GLUCOSE BLDC GLUCOMTR-MCNC: 106 MG/DL (ref 70–105)
GLUCOSE BLDC GLUCOMTR-MCNC: 112 MG/DL (ref 70–105)
GLUCOSE BLDC GLUCOMTR-MCNC: 114 MG/DL (ref 70–105)
GLUCOSE BLDC GLUCOMTR-MCNC: 120 MG/DL (ref 70–105)
GLUCOSE BLDC GLUCOMTR-MCNC: 124 MG/DL (ref 70–105)
GLUCOSE BLDC GLUCOMTR-MCNC: 130 MG/DL (ref 70–105)
GLUCOSE BLDC GLUCOMTR-MCNC: 135 MG/DL (ref 70–105)
GLUCOSE BLDC GLUCOMTR-MCNC: 154 MG/DL (ref 74–100)
GLUCOSE BLDC GLUCOMTR-MCNC: 155 MG/DL (ref 70–105)
GLUCOSE BLDC GLUCOMTR-MCNC: 161 MG/DL (ref 70–105)
GLUCOSE BLDC GLUCOMTR-MCNC: 173 MG/DL (ref 70–105)
GLUCOSE BLDC GLUCOMTR-MCNC: 180 MG/DL (ref 74–100)
GLUCOSE BLDC GLUCOMTR-MCNC: 187 MG/DL (ref 74–100)
GLUCOSE BLDC GLUCOMTR-MCNC: 89 MG/DL (ref 70–105)
GLUCOSE SERPL-MCNC: 170 MG/DL (ref 65–99)
HCO3 BLDA-SCNC: 25 MMOL/L (ref 21–28)
HCO3 BLDA-SCNC: 28 MMOL/L (ref 21–28)
HCO3 BLDA-SCNC: 29.3 MMOL/L (ref 21–28)
HCT VFR BLD AUTO: 36.5 % (ref 37.5–51)
HEMODILUTION: NO
HGB BLD-MCNC: 12.4 G/DL (ref 13–17.7)
INHALED O2 CONCENTRATION: 48 %
INHALED O2 CONCENTRATION: 50 %
INHALED O2 CONCENTRATION: 50 %
INR PPP: 1.21 (ref 0.9–1.1)
LARGE PLATELETS: ABNORMAL
LYMPHOCYTES # BLD MANUAL: 0.45 10*3/MM3 (ref 0.7–3.1)
LYMPHOCYTES NFR BLD MANUAL: 9 % (ref 5–12)
MAGNESIUM SERPL-MCNC: 2.7 MG/DL (ref 1.6–2.6)
MCH RBC QN AUTO: 29.5 PG (ref 26.6–33)
MCHC RBC AUTO-ENTMCNC: 34 G/DL (ref 31.5–35.7)
MCV RBC AUTO: 86.9 FL (ref 79–97)
MODALITY: ABNORMAL
MONOCYTES # BLD: 1.36 10*3/MM3 (ref 0.1–0.9)
NEUTROPHILS # BLD AUTO: 13.11 10*3/MM3 (ref 1.7–7)
NEUTROPHILS NFR BLD MANUAL: 77 % (ref 42.7–76)
NEUTS BAND NFR BLD MANUAL: 10 % (ref 0–5)
NEUTS VAC BLD QL SMEAR: ABNORMAL
PCO2 BLDA: 40.3 MM HG (ref 35–48)
PCO2 BLDA: 44.7 MM HG (ref 35–48)
PCO2 BLDA: 52.7 MM HG (ref 35–48)
PEEP RESPIRATORY: 5 CM[H2O]
PEEP RESPIRATORY: 5 CM[H2O]
PH BLDA: 7.28 PH UNITS (ref 7.35–7.45)
PH BLDA: 7.41 PH UNITS (ref 7.35–7.45)
PH BLDA: 7.47 PH UNITS (ref 7.35–7.45)
PHOSPHATE SERPL-MCNC: 2.6 MG/DL (ref 2.5–4.5)
PLATELET # BLD AUTO: 161 10*3/MM3 (ref 140–450)
PMV BLD AUTO: 9.9 FL (ref 6–12)
PO2 BLD: 134 MM[HG] (ref 0–500)
PO2 BLD: 144 MM[HG] (ref 0–500)
PO2 BLD: 189 MM[HG] (ref 0–500)
PO2 BLDA: 67 MM HG (ref 83–108)
PO2 BLDA: 68.9 MM HG (ref 83–108)
PO2 BLDA: 94.4 MM HG (ref 83–108)
POTASSIUM BLDA-SCNC: 4.2 MMOL/L (ref 3.5–4.5)
POTASSIUM SERPL-SCNC: 4.3 MMOL/L (ref 3.5–5.2)
PROTHROMBIN TIME: 15.3 SECONDS (ref 11.7–14.2)
RBC # BLD AUTO: 4.2 10*6/MM3 (ref 4.14–5.8)
RBC MORPH BLD: NORMAL
RESPIRATORY RATE: 18
RESPIRATORY RATE: 18
SAO2 % BLDCOA: 90.8 % (ref 94–98)
SAO2 % BLDCOA: 94.1 % (ref 94–98)
SAO2 % BLDCOA: 97.3 % (ref 94–98)
SCAN SLIDE: NORMAL
SODIUM BLD-SCNC: 144 MMOL/L (ref 138–146)
SODIUM SERPL-SCNC: 140 MMOL/L (ref 136–145)
VARIANT LYMPHS NFR BLD MANUAL: 3 % (ref 19.6–45.3)
VENTILATOR MODE: ABNORMAL
VENTILATOR MODE: ABNORMAL
VT ON VENT VENT: 545 ML
VT ON VENT VENT: 650 ML
WBC NRBC COR # BLD AUTO: 15.07 10*3/MM3 (ref 3.4–10.8)

## 2025-07-18 PROCEDURE — 94761 N-INVAS EAR/PLS OXIMETRY MLT: CPT

## 2025-07-18 PROCEDURE — 82948 REAGENT STRIP/BLOOD GLUCOSE: CPT

## 2025-07-18 PROCEDURE — 71045 X-RAY EXAM CHEST 1 VIEW: CPT

## 2025-07-18 PROCEDURE — 80069 RENAL FUNCTION PANEL: CPT | Performed by: NURSE PRACTITIONER

## 2025-07-18 PROCEDURE — 80051 ELECTROLYTE PANEL: CPT

## 2025-07-18 PROCEDURE — 83735 ASSAY OF MAGNESIUM: CPT | Performed by: NURSE PRACTITIONER

## 2025-07-18 PROCEDURE — 97167 OT EVAL HIGH COMPLEX 60 MIN: CPT

## 2025-07-18 PROCEDURE — 25810000003 SODIUM CHLORIDE 0.9 % SOLUTION 250 ML FLEX CONT: Performed by: NURSE PRACTITIONER

## 2025-07-18 PROCEDURE — 82330 ASSAY OF CALCIUM: CPT

## 2025-07-18 PROCEDURE — 82803 BLOOD GASES ANY COMBINATION: CPT

## 2025-07-18 PROCEDURE — 94660 CPAP INITIATION&MGMT: CPT

## 2025-07-18 PROCEDURE — 82565 ASSAY OF CREATININE: CPT

## 2025-07-18 PROCEDURE — 25010000002 MORPHINE PER 10 MG: Performed by: NURSE PRACTITIONER

## 2025-07-18 PROCEDURE — 94799 UNLISTED PULMONARY SVC/PX: CPT

## 2025-07-18 PROCEDURE — 85610 PROTHROMBIN TIME: CPT | Performed by: NURSE PRACTITIONER

## 2025-07-18 PROCEDURE — 25010000002 CEFAZOLIN PER 500 MG: Performed by: NURSE PRACTITIONER

## 2025-07-18 PROCEDURE — 93010 ELECTROCARDIOGRAM REPORT: CPT | Performed by: INTERNAL MEDICINE

## 2025-07-18 PROCEDURE — 25010000002 BUMETANIDE PER 0.5 MG: Performed by: NURSE PRACTITIONER

## 2025-07-18 PROCEDURE — 93005 ELECTROCARDIOGRAM TRACING: CPT

## 2025-07-18 PROCEDURE — 25810000003 SODIUM CHLORIDE 0.9 % SOLUTION: Performed by: NURSE PRACTITIONER

## 2025-07-18 PROCEDURE — 93005 ELECTROCARDIOGRAM TRACING: CPT | Performed by: NURSE PRACTITIONER

## 2025-07-18 PROCEDURE — 25010000002 ENOXAPARIN PER 10 MG: Performed by: NURSE PRACTITIONER

## 2025-07-18 PROCEDURE — 99232 SBSQ HOSP IP/OBS MODERATE 35: CPT | Performed by: NURSE PRACTITIONER

## 2025-07-18 PROCEDURE — 25010000002 NICARDIPINE 2.5 MG/ML SOLUTION 10 ML VIAL: Performed by: NURSE PRACTITIONER

## 2025-07-18 PROCEDURE — 97162 PT EVAL MOD COMPLEX 30 MIN: CPT

## 2025-07-18 PROCEDURE — 85025 COMPLETE CBC W/AUTO DIFF WBC: CPT | Performed by: NURSE PRACTITIONER

## 2025-07-18 PROCEDURE — 25010000002 MAGNESIUM SULFATE IN D5W 1G/100ML (PREMIX) 1-5 GM/100ML-% SOLUTION: Performed by: NURSE PRACTITIONER

## 2025-07-18 PROCEDURE — 25010000002 ACETAMINOPHEN 10 MG/ML SOLUTION: Performed by: NURSE PRACTITIONER

## 2025-07-18 PROCEDURE — 85007 BL SMEAR W/DIFF WBC COUNT: CPT | Performed by: NURSE PRACTITIONER

## 2025-07-18 RX ORDER — SIMETHICONE 80 MG
80 TABLET,CHEWABLE ORAL 4 TIMES DAILY PRN
Status: DISCONTINUED | OUTPATIENT
Start: 2025-07-18 | End: 2025-07-21 | Stop reason: HOSPADM

## 2025-07-18 RX ORDER — CARVEDILOL 3.12 MG/1
3.12 TABLET ORAL 2 TIMES DAILY WITH MEALS
Status: DISCONTINUED | OUTPATIENT
Start: 2025-07-18 | End: 2025-07-21 | Stop reason: HOSPADM

## 2025-07-18 RX ORDER — BUMETANIDE 0.25 MG/ML
1 INJECTION, SOLUTION INTRAMUSCULAR; INTRAVENOUS ONCE
Status: COMPLETED | OUTPATIENT
Start: 2025-07-18 | End: 2025-07-18

## 2025-07-18 RX ORDER — INDOMETHACIN 25 MG/1
50 CAPSULE ORAL ONCE
Status: COMPLETED | OUTPATIENT
Start: 2025-07-18 | End: 2025-07-18

## 2025-07-18 RX ADMIN — MUPIROCIN 1 APPLICATION: 20 OINTMENT TOPICAL at 08:24

## 2025-07-18 RX ADMIN — HYDROCODONE BITARTRATE AND ACETAMINOPHEN 1 TABLET: 5; 325 TABLET ORAL at 03:13

## 2025-07-18 RX ADMIN — CYCLOBENZAPRINE HYDROCHLORIDE 10 MG: 10 TABLET, FILM COATED ORAL at 00:05

## 2025-07-18 RX ADMIN — CEFAZOLIN 2 G: 2 INJECTION, POWDER, FOR SOLUTION INTRAMUSCULAR; INTRAVENOUS at 03:24

## 2025-07-18 RX ADMIN — PANTOPRAZOLE SODIUM 40 MG: 40 TABLET, DELAYED RELEASE ORAL at 05:37

## 2025-07-18 RX ADMIN — POLYETHYLENE GLYCOL 3350 17 G: 17 POWDER, FOR SOLUTION ORAL at 20:59

## 2025-07-18 RX ADMIN — SODIUM CHLORIDE 10 MG/HR: 9 INJECTION, SOLUTION INTRAVENOUS at 05:27

## 2025-07-18 RX ADMIN — MAGNESIUM SULFATE HEPTAHYDRATE 1 G: 1 INJECTION, SOLUTION INTRAVENOUS at 03:24

## 2025-07-18 RX ADMIN — GABAPENTIN 400 MG: 400 CAPSULE ORAL at 15:08

## 2025-07-18 RX ADMIN — HYDROCODONE BITARTRATE AND ACETAMINOPHEN 1 TABLET: 5; 325 TABLET ORAL at 13:26

## 2025-07-18 RX ADMIN — HYDROCODONE BITARTRATE AND ACETAMINOPHEN 1 TABLET: 5; 325 TABLET ORAL at 17:59

## 2025-07-18 RX ADMIN — SODIUM CHLORIDE 10 MG/HR: 9 INJECTION, SOLUTION INTRAVENOUS at 02:43

## 2025-07-18 RX ADMIN — SENNOSIDES AND DOCUSATE SODIUM 2 TABLET: 50; 8.6 TABLET ORAL at 21:00

## 2025-07-18 RX ADMIN — ASPIRIN 81 MG: 81 TABLET, COATED ORAL at 08:24

## 2025-07-18 RX ADMIN — CYCLOBENZAPRINE HYDROCHLORIDE 10 MG: 10 TABLET, FILM COATED ORAL at 21:13

## 2025-07-18 RX ADMIN — GABAPENTIN 400 MG: 400 CAPSULE ORAL at 08:24

## 2025-07-18 RX ADMIN — CEFAZOLIN 2 G: 2 INJECTION, POWDER, FOR SOLUTION INTRAMUSCULAR; INTRAVENOUS at 21:00

## 2025-07-18 RX ADMIN — HYDROCODONE BITARTRATE AND ACETAMINOPHEN 1 TABLET: 5; 325 TABLET ORAL at 08:29

## 2025-07-18 RX ADMIN — GABAPENTIN 400 MG: 400 CAPSULE ORAL at 21:00

## 2025-07-18 RX ADMIN — MAGNESIUM SULFATE HEPTAHYDRATE 1 G: 1 INJECTION, SOLUTION INTRAVENOUS at 13:26

## 2025-07-18 RX ADMIN — MORPHINE SULFATE 2 MG: 2 INJECTION, SOLUTION INTRAMUSCULAR; INTRAVENOUS at 05:42

## 2025-07-18 RX ADMIN — ACETAMINOPHEN 1000 MG: 10 INJECTION INTRAVENOUS at 03:24

## 2025-07-18 RX ADMIN — MORPHINE SULFATE 2 MG: 2 INJECTION, SOLUTION INTRAMUSCULAR; INTRAVENOUS at 10:21

## 2025-07-18 RX ADMIN — CEFAZOLIN 2 G: 2 INJECTION, POWDER, FOR SOLUTION INTRAMUSCULAR; INTRAVENOUS at 13:26

## 2025-07-18 RX ADMIN — CARVEDILOL 3.12 MG: 3.12 TABLET, FILM COATED ORAL at 10:14

## 2025-07-18 RX ADMIN — SODIUM CHLORIDE 30 ML/HR: 9 INJECTION, SOLUTION INTRAVENOUS at 00:00

## 2025-07-18 RX ADMIN — SENNOSIDES AND DOCUSATE SODIUM 2 TABLET: 50; 8.6 TABLET ORAL at 08:24

## 2025-07-18 RX ADMIN — HYDROCODONE BITARTRATE AND ACETAMINOPHEN 1 TABLET: 5; 325 TABLET ORAL at 23:31

## 2025-07-18 RX ADMIN — ENOXAPARIN SODIUM 40 MG: 100 INJECTION SUBCUTANEOUS at 15:08

## 2025-07-18 RX ADMIN — CARVEDILOL 3.12 MG: 3.12 TABLET, FILM COATED ORAL at 17:55

## 2025-07-18 RX ADMIN — SODIUM BICARBONATE 50 MEQ: 84 INJECTION INTRAVENOUS at 01:46

## 2025-07-18 RX ADMIN — MUPIROCIN 1 APPLICATION: 20 OINTMENT TOPICAL at 21:00

## 2025-07-18 RX ADMIN — POLYETHYLENE GLYCOL 3350 17 G: 17 POWDER, FOR SOLUTION ORAL at 08:24

## 2025-07-18 RX ADMIN — MORPHINE SULFATE 2 MG: 2 INJECTION, SOLUTION INTRAMUSCULAR; INTRAVENOUS at 03:41

## 2025-07-18 RX ADMIN — BUMETANIDE 1 MG: 0.25 INJECTION INTRAMUSCULAR; INTRAVENOUS at 08:24

## 2025-07-18 RX ADMIN — SODIUM CHLORIDE 5 MG/HR: 9 INJECTION, SOLUTION INTRAVENOUS at 10:14

## 2025-07-18 RX ADMIN — ATORVASTATIN CALCIUM 40 MG: 40 TABLET, FILM COATED ORAL at 21:00

## 2025-07-18 RX ADMIN — MORPHINE SULFATE 2 MG: 2 INJECTION, SOLUTION INTRAMUSCULAR; INTRAVENOUS at 01:45

## 2025-07-18 RX ADMIN — MORPHINE SULFATE 2 MG: 2 INJECTION, SOLUTION INTRAMUSCULAR; INTRAVENOUS at 15:08

## 2025-07-18 NOTE — PLAN OF CARE
Goal Outcome Evaluation:  Plan of Care Reviewed With: patient  Pt presents as a 40 y/o M who had developed worsening shortness of breath and chest tightness over the past few weeks. PMHx:STEMI, CAD s/p PCI, HTN, HLD, tobacco abuse, and a strong family history of premature CAD. Pt is now POD #1 for CABG x 4 per Dr. Yung.  Postprocedure patient extubated well however became increasingly acidotic throughout the evening requiring BiPAP.  Intensivist was consulted for pulmonary status.Pt  lethargic and O x 4 with O2 at 4 L. Use of heart hugger and review of sternal precautions was initiated. Pt resides with spouse in [facility] with no steps. He and spouse works. Pt is typically independent with community mobility without AD. Pt has tentative plan to stay with his sister at [place] while his spouse is at work. This date, pt transferred well with CGA of 1. Pt ambulated 175 feet with rolling walker with CGA of 1. VSS. Pt is expected to progress very well through cardiac recovery. PT recommendation is Home with Assist.              Anticipated Discharge Disposition (PT): home with assist

## 2025-07-18 NOTE — THERAPY EVALUATION
Patient Name: Clark Randolph  : 1985    MRN: [id number]                              Today's Date: 2025       Admit Date: 2025    Visit Dx:     ICD-10-CM ICD-9-CM   1. Coronary artery disease involving native coronary artery of native heart without angina pectoris  I25.10 414.01     Patient Active Problem List   Diagnosis    Coronary heart disease    Hyperlipidemia    Hypertension    Myocardial infarction    Palpitations    Unstable angina    Chest pain    CAD (coronary artery disease)     Past Medical History:   Diagnosis Date    Coronary artery disease     Hyperlipidemia     Hypertension     Myocardial infarction 09/15/2017     Past Surgical History:   Procedure Laterality Date    CARDIAC CATHETERIZATION Right 7/3/2025    Procedure: Left Heart Cath with Coronary Angiography;  Surgeon: Leonard Richardson MD;  Location: [facility] CATH INVASIVE LOCATION;  Service: Cardiovascular;  Laterality: Right;    CORONARY STENT PLACEMENT  2017    NO PAST SURGERIES        General Information       Row Name 25 1402          Physical Therapy Time and Intention    Document Type evaluation  -BR     Mode of Treatment physical therapy  -BR       Row Name 25 1402          General Information    Patient Profile Reviewed yes  -BR     Prior Level of Function independent:;all household mobility;community mobility;gait;transfer;driving;work  -BR     Existing Precautions/Restrictions cardiac;oxygen therapy device and L/min;sternal;fall  -BR     Barriers to Rehab none identified  -BR       Row Name 25 1402          Living Environment    Current Living Arrangements home  -BR     People in Home spouse  Pt plans to tentatively stay with his sister while his spouse is at work at [employer].  -BR       Row Name 25 1402          Home Main Entrance    Number of Stairs, Main Entrance one  -BR       Row Name 25 1402          Stairs Within Home, Primary    Number of Stairs, Within Home, Primary none  -BR        Row Name 07/18/25 1402          Cognition    Orientation Status (Cognition) other (see comments)  -BR       Row Name 07/18/25 1402          Safety Issues/Impairments Affecting Functional Mobility    Impairments Affecting Function (Mobility) endurance/activity tolerance;pain;range of motion (ROM)  -BR               User Key  (r) = Recorded By, (t) = Taken By, (c) = Cosigned By      Initials Name Provider Type    BR Taylor Le PT Physical Therapist                   Mobility       Row Name 07/18/25 1404          Bed Mobility    Comment, (Bed Mobility) Pt was sitting up in recliner upon PT arrival.  -BR       Row Name 07/18/25 1404          Sit-Stand Transfer    Sit-Stand [place] (Transfers) contact guard;1 person assist;1 person to manage equipment  -BR       Row Name 07/18/25 1404          Gait/Stairs (Locomotion)    [place] Level (Gait) contact guard;1 person assist;1 person to manage equipment  -BR     Assistive Device (Gait) walker, front-wheeled  -BR     Patient was able to Ambulate yes  -BR     Distance in Feet (Gait) 175  -BR     Deviations/Abnormal Patterns (Gait) marquez decreased;antalgic  -BR               User Key  (r) = Recorded By, (t) = Taken By, (c) = Cosigned By      Initials Name Provider Type    BR Taylor Le PT Physical Therapist                   Obj/Interventions       Row Name 07/18/25 1404          Range of Motion Comprehensive    General Range of Motion bilateral lower extremity ROM WFL  -BR       Row Name 07/18/25 1404          Strength Comprehensive (MMT)    General Manual Muscle Testing (MMT) Assessment no strength deficits identified  -BR       Row Name 07/18/25 1404          Balance    Balance Assessment sitting static balance;sitting dynamic balance;standing static balance  -BR     Static Sitting Balance standby assist  -BR     Dynamic Sitting Balance supervision  -BR     Position, Sitting Balance unsupported;sitting in chair  -BR     Static Standing  Balance contact guard  -BR     Position/Device Used, Standing Balance supported;walker, rolling  -BR       Row Name 07/18/25 1407          Sensory Assessment (Somatosensory)    Sensory Assessment (Somatosensory) other (see comments)  Left fingers 4 and 5 are numb.  -BR               User Key  (r) = Recorded By, (t) = Taken By, (c) = Cosigned By      Initials Name Provider Type    BR Taylor Le, PT Physical Therapist                   Goals/Plan       Row Name 07/18/25 1415          Bed Mobility Goal 1 (PT)    Activity/Assistive Device (Bed Mobility Goal 1, PT) bed mobility activities, all  -BR     [place] Level/Cues Needed (Bed Mobility Goal 1, PT) modified independence  -BR     Time Frame (Bed Mobility Goal 1, PT) long term goal (LTG);2 weeks  -BR       Row Name 07/18/25 1415          Transfer Goal 1 (PT)    Activity/Assistive Device (Transfer Goal 1, PT) transfers, all  -BR     [place] Level/Cues Needed (Transfer Goal 1, PT) modified independence  -BR     Time Frame (Transfer Goal 1, PT) long term goal (LTG);2 weeks  -BR       Row Name 07/18/25 1415          Gait Training Goal 1 (PT)    Activity/Assistive Device (Gait Training Goal 1, PT) gait (walking locomotion)  -BR     [place] Level (Gait Training Goal 1, PT) modified independence  -BR     Distance (Gait Training Goal 1, PT) 350  -BR     Time Frame (Gait Training Goal 1, PT) long term goal (LTG);2 weeks  -BR       Row Name 07/18/25 1415          Stairs Goal 1 (PT)    Activity/Assistive Device (Stairs Goal 1, PT) stairs, all skills  -BR     [place] Level/Cues Needed (Stairs Goal 1, PT) supervision required  -BR     Number of Stairs (Stairs Goal 1, PT) 5  -BR     Time Frame (Stairs Goal 1, PT) long term goal (LTG);2 weeks  -BR       Row Name 07/18/25 1415          Therapy Assessment/Plan (PT)    Planned Therapy Interventions (PT) balance training;bed mobility training;gait training;home exercise program;patient/family  education;transfer training;ROM (range of motion);stair training;strengthening  -BR               User Key  (r) = Recorded By, (t) = Taken By, (c) = Cosigned By      Initials Name Provider Type    BR Taylor Le, JENI Physical Therapist                   Clinical Impression       Row Name 07/18/25 1408          Pain    Pretreatment Pain Rating 6/10  -BR     Posttreatment Pain Rating 7/10  -BR     Pain Side/Orientation generalized  -BR       Row Name 07/18/25 1415 07/18/25 1407       Plan of Care Review    Plan of Care Reviewed With -- patient  -BR    Outcome Evaluation Pt presents as a 38 y/o M who had developed worsening shortness of breath and chest tightness over the past few weeks. PMHx:STEMI, CAD s/p PCI, HTN, HLD, tobacco abuse, and a strong family history of premature CAD. Pt is now POD #1 for CABG x 4 per Dr. Yung.  Postprocedure patient extubated well however became increasingly acidotic throughout the evening requiring BiPAP.  Intensivist was consulted for pulmonary status.Pt  lethargic and O x 4 with O2 at 4 L. Use of heart hugger and review of sternal precautions was initiated. Pt resides with spouse in [facility] with no steps. He and spouse works. Pt is typically independent with community mobility without AD. Pt has tentative plan to stay with his sister at [facility] while his spouse is at work. This date, pt transferred well with CGA of 1. Pt ambulated 175 feet with rolling walker with CGA of 1. VSS. Pt is expected to progress very well through cardiac recovery. PT recommendation is Home with Assist.  -BR --      Row Name 07/18/25 1408          Therapy Assessment/Plan (PT)    Rehab Potential (PT) good  -BR     Criteria for Skilled Interventions Met (PT) yes;meets criteria;skilled treatment is necessary  -BR     Therapy Frequency (PT) 5 times/wk  -BR     Predicted Duration of Therapy Intervention (PT) until D/C  -BR       Row Name 07/18/25 1400          Vital Signs    Pre Systolic BP Rehab 126   -BR     Pre Treatment Diastolic BP 73  -BR     Intra Systolic BP Rehab 125  -BR     Intra Treatment Diastolic BP 71  -BR     Post Systolic BP Rehab 114  -BR     Post Treatment Diastolic BP 60  -BR     Pretreatment Heart Rate (beats/min) 74  -BR     Intratreatment Heart Rate (beats/min) 93  -BR     Posttreatment Heart Rate (beats/min) 83  -BR     Pre SpO2 (%) 96  -BR     O2 Delivery Pre Treatment nasal cannula  5L  -BR     Intra SpO2 (%) 93  -BR     O2 Delivery Intra Treatment nasal cannula  6L  -BR     Post SpO2 (%) 93  -BR     O2 Delivery Post Treatment nasal cannula  4L  -BR     Pre Patient Position Sitting  -BR     Intra Patient Position Standing  -BR     Post Patient Position Sitting  -BR       Row Name 07/18/25 1407          Positioning and Restraints    Pre-Treatment Position sitting in chair/recliner  -BR     Post Treatment Position chair  -BR     In Chair notified nsg;reclined;call light within reach;encouraged to call for assist;exit alarm on;legs elevated  -BR               User Key  (r) = Recorded By, (t) = Taken By, (c) = Cosigned By      Initials Name Provider Type    BR Taylor Le, PT Physical Therapist                   Outcome Measures       Row Name 07/18/25 1416          How much help from another person do you currently need...    Turning from your back to your side while in flat bed without using bedrails? 2  -BR     Moving from lying on back to sitting on the side of a flat bed without bedrails? 2  -BR     Moving to and from a bed to a chair (including a wheelchair)? 3  -BR     Standing up from a chair using your arms (e.g., wheelchair, bedside chair)? 3  -BR     Climbing 3-5 steps with a railing? 2  -BR     To walk in hospital room? 3  -BR     AM-PAC 6 Clicks Score (PT) 15  -BR     Highest Level of Mobility Goal Move to Chair/Commode-4  -BR       Row Name 07/18/25 1416          Functional Assessment    Outcome Measure Options AM-PAC 6 Clicks Basic Mobility (PT)  -BR               User  Key  (r) = Recorded By, (t) = Taken By, (c) = Cosigned By      Initials Name Provider Type    BR Taylor Le PT Physical Therapist                                 Physical Therapy Education       Title: PT OT SLP Therapies (Done)       Topic: Physical Therapy (Done)       Point: Mobility training (Done)       Learning Progress Summary            Patient Acceptance, D,H,E, VU,DU,NR by BR at 7/18/2025 1416                      Point: Home exercise program (Done)       Learning Progress Summary            Patient Acceptance, D,H,E, VU,DU,NR by BR at 7/18/2025 1416                      Point: Body mechanics (Done)       Learning Progress Summary            Patient Acceptance, D,H,E, VU,DU,NR by BR at 7/18/2025 1416                      Point: Precautions (Done)       Learning Progress Summary            Patient Acceptance, D,H,E, VU,DU,NR by BR at 7/18/2025 1416                                      User Key       Initials Effective Dates Name Provider Type Discipline     02/01/22 -  Taylor Le PT Physical Therapist PT                  PT Recommendation and Plan  Planned Therapy Interventions (PT): balance training, bed mobility training, gait training, home exercise program, patient/family education, transfer training, ROM (range of motion), stair training, strengthening  Outcome Evaluation: Pt presents as a 40 y/o M who had developed worsening shortness of breath and chest tightness over the past few weeks. PMHx:STEMI, CAD s/p PCI, HTN, HLD, tobacco abuse, and a strong family history of premature CAD. Pt is now POD #1 for CABG x 4 per Dr. Yung.  Postprocedure patient extubated well however became increasingly acidotic throughout the evening requiring BiPAP.  Intensivist was consulted for pulmonary status.Pt  lethargic and O x 4 with O2 at 4 L. Use of heart hugger and review of sternal precautions was initiated. Pt resides with spouse in [facility] with no steps. He and spouse works. Pt is typically  independent with community mobility without AD. Pt has tentative plan to stay with his sister at [facility] [place] while his spouse is at work. This date, pt transferred well with CGA of 1. Pt ambulated 175 feet with rolling walker with CGA of 1. VSS. Pt is expected to progress very well through cardiac recovery. PT recommendation is Home with Assist.     Time Calculation:         PT Charges       Row Name 07/18/25 1416             Time Calculation    Start Time 0920  -BR      Stop Time 0949  -BR      Time Calculation (min) 29 min  -BR      PT Received On 07/18/25  -BR      PT - Next Appointment 07/20/25  -BR      PT Goal Re-Cert Due Date 08/01/25  -BR         Time Calculation- PT    Total Timed Code Minutes- PT 0 minute(s)  -BR                User Key  (r) = Recorded By, (t) = Taken By, (c) = Cosigned By      Initials Name Provider Type    Taylor Fabian, PT Physical Therapist                  Therapy Charges for Today       Code Description Service Date Service Provider Modifiers Qty    95326665779 HC PT EVAL MOD COMPLEXITY 4 7/18/2025 Taylor Le, PT GP 1            PT G-Codes  Outcome Measure Options: AM-PAC 6 Clicks Basic Mobility (PT)  AM-PAC 6 Clicks Score (PT): 15  PT Discharge Summary  Anticipated Discharge Disposition (PT): home with assist    Taylor Le PT  7/18/2025

## 2025-07-18 NOTE — CONSULTS
Critical Care Consult Note   Clark Randolph : 1985 MRN:[id number] LOS:1 ROOM: [facility]6/1     Reason for admission: Coronary heart disease     Consulting provider: Dr Quiñonez    Reason for consultation: Medical management of problems noted below.    Assessment / Plan     Severe CAD  Significant family history of premature CAD  STEMI with PCI to LAD in ; on Plavix at home  Status post CABG 2025  CV surgery following    HFrEF   Echo shows EF 45%  LVIDD 5.9 cm, RVSP 42 mmHg  Patient takes home losartan and Coreg    Essential Hypertension, Chronic  Takes losartan, Coreg, doxazosin  Monitor with routine vital signs     Hyperlipidemia  LDL 78, HDL 29, triglycerides 126, and total cholesterol 130  Takes atorvastatin    COPD  May benefit from diuretic  BiPAP for now, with worsening acidosis and hypercarbia  Restrictive lung disease; PFTs performed 7/15    Nicotine dependence  Recommend complete cessation    Anxiety/Depression  Continue home meds as appropriate    GERD  Continue PPI    Obesity  encourage lifestyle modifications        Code Status (Patient has no pulse and is not breathing): CPR (Attempt to Resuscitate)  Medical Interventions (Patient has pulse or is breathing): Full       Nutrition: NPO Diet NPO Type: Sips with Meds Patient isn't on Tube Feeding     VTE Prophylaxis:  Pharmacologic & mechanical VTE prophylaxis orders are present.         History of Present illness     Mr. Randolph is a 39 year-old male with PMH of STEMI, CAD s/p PCI, HTN, HLD, tobacco abuse, and a strong family history of premature CAD who has developed worsening shortness of breath and chest tightness over the past few weeks. He was seen in the office by Dr. Richardson on 25 and it was recommended that he undergo cardiac catheterization. Cardiac catheterization on 7/3/25 revealed severe multivessel CAD with significant in-stent restenosis of the LAD. Transthoracic echocardiogram on 7/3/25 demonstrated EF 46-50% and severe  hypokinesis of the mid to distal septal and distal anterior wall and apex. Patient was subsequently referred to Cardiac Surgery for surgical evaluation and was seen in the office by Dr. Yung on 7/7/25.  On 7/17/2025 patient had CABG x 3 with EVH of the left legs.  Postprocedure patient extubated well however became increasingly acidotic throughout the evening requiring BiPAP.  Intensivist was consulted for pulmonary status.    ACP: Patient wishes to be full code with full intervention; his wife is his decision-maker if he is unable.    Patient was seen and examined on 07/18/25 at 01:38 EDT .    Past Medical/Surgical/Social/Family History & Allergies     Past Medical History:   Diagnosis Date    Coronary artery disease     Hyperlipidemia     Hypertension     Myocardial infarction 09/15/2017      Past Surgical History:   Procedure Laterality Date    CARDIAC CATHETERIZATION Right 7/3/2025    Procedure: Left Heart Cath with Coronary Angiography;  Surgeon: Leonard Richardson MD;  Location: [facility] CATH INVASIVE LOCATION;  Service: Cardiovascular;  Laterality: Right;    CORONARY STENT PLACEMENT  09/2017    NO PAST SURGERIES        Social History     Socioeconomic History    Marital status:    Tobacco Use    Smoking status: Every Day     Current packs/day: 0.50     Average packs/day: 0.5 packs/day for 15.0 years (7.5 ttl pk-yrs)     Types: Cigarettes     Passive exposure: Current    Smokeless tobacco: Never   Vaping Use    Vaping status: Never Used   Substance and Sexual Activity    Alcohol use: No    Drug use: Yes     Types: Marijuana    Sexual activity: Not Currently     Partners: Female     Birth control/protection: Condom, Abstinence      Family History   Problem Relation Age of Onset    Heart disease Mother     Heart disease Father     Heart failure Father     No Known Problems Sister     Heart disease Brother     Heart failure Brother     Heart attack Brother     No Known Problems Maternal Aunt     No Known  Problems Maternal Uncle     No Known Problems Paternal Aunt     No Known Problems Paternal Uncle     No Known Problems Maternal Grandmother     Heart disease Maternal Grandfather     Heart failure Maternal Grandfather     No Known Problems Paternal Grandmother     No Known Problems Paternal Grandfather     No Known Problems Other     Anemia Neg Hx     Arrhythmia Neg Hx     Asthma Neg Hx     Clotting disorder Neg Hx     Fainting Neg Hx     Hyperlipidemia Neg Hx     Hypertension Neg Hx       Allergies   Allergen Reactions    Metoprolol Myalgia        Home Medications     Prior to Admission medications    Medication Sig Start Date End Date Taking? Authorizing Provider   atorvastatin (LIPITOR) 40 MG tablet Take 1 tablet by mouth Every Night.    Lauro Toledo MD   carvedilol (COREG) 3.125 MG tablet Take 1 tablet by mouth Daily.    Lauro Toledo MD   clopidogrel (PLAVIX) 75 MG tablet Take 1 tablet by mouth Daily. 30 days only needs an appointment for more refills. 10/14/24   Leonard Richardson MD   doxazosin (CARDURA) 2 MG tablet Take 1 tablet by mouth every night at bedtime. 6/2/25   Lauro Toledo MD   gabapentin (NEURONTIN) 400 MG capsule Take 1 capsule by mouth 3 times a day. 5/22/25   Lauro Toledo MD   LORazepam (ATIVAN) 0.5 MG tablet Take 1 tablet by mouth 2 (Two) Times a Day. 7/26/19   Lauro Toledo MD   losartan (COZAAR) 100 MG tablet Take 1 tablet by mouth Daily. 7/24/19   Lauro Toledo MD   mupirocin (BACTROBAN) 2 % ointment Apply to nares (inside each nostril) twice daily as directed for procedure 7/14/25   Berto Yung MD   nitroglycerin (NITROSTAT) 0.4 MG SL tablet 1 under the tongue as needed for angina, may repeat q5mins for up three doses 6/27/25   Pita Hooker APRN   pantoprazole (PROTONIX) 40 MG EC tablet Take 1 tablet by mouth Daily.    Lauro Toledo MD   traMADol (ULTRAM) 50 MG tablet Take 2 tablets by mouth 2 (Two) Times a Day. 7/13/19    Provider, MD Lauro        Objective / Physical Exam     Vital signs:  Temp: 97.2 °F (36.2 °C)  BP: 128/52  Heart Rate: 76  Resp: 15  SpO2: 93 %  Weight: 130 kg (286 lb 13.1 oz)    Admission Weight: Weight: 128 kg (282 lb 10.1 oz)    Physical Exam  Vitals and nursing note reviewed.   Constitutional:       Appearance: Normal appearance.   HENT:      Head: Normocephalic.      Nose: Nose normal.      Mouth/Throat:      Mouth: Mucous membranes are moist.      Pharynx: Oropharynx is clear.   Eyes:      Pupils: Pupils are equal, round, and reactive to light.   Cardiovascular:      Rate and Rhythm: Normal rate and regular rhythm.      Pulses: Normal pulses.      Heart sounds: Normal heart sounds.   Pulmonary:      Effort: Pulmonary effort is normal.      Breath sounds: Normal breath sounds.   Abdominal:      General: Bowel sounds are normal.      Palpations: Abdomen is soft.   Musculoskeletal:         General: Normal range of motion.      Cervical back: Normal range of motion.   Skin:     General: Skin is warm and dry.      Capillary Refill: Capillary refill takes 2 to 3 seconds.   Neurological:      General: No focal deficit present.      Mental Status: He is alert and oriented to person, place, and time. Mental status is at baseline.   Psychiatric:         Mood and Affect: Mood normal.         Behavior: Behavior normal.         Thought Content: Thought content normal.         Judgment: Judgment normal.          Labs     Results from last 7 days   Lab Units 07/17/25  1808 07/17/25  1230 07/17/25  1034 07/17/25  1022 07/17/25  1002 07/17/25  0730 07/15/25  0823   WBC 10*3/mm3 14.56* 15.14*  --   --   --   --  9.76   HEMATOCRIT % 34.6* 37.1*  --   --   --   --  44.7   HEMATOCRIT POC %  --   --  33* 33* 35*   < >  --    PLATELETS 10*3/mm3 167 150  --   --   --   --  249    < > = values in this interval not displayed.      Results from last 7 days   Lab Units 07/18/25  0057 07/17/25  1230 07/15/25  0823   SODIUM mmol/L   --  142 139   POTASSIUM mmol/L  --  4.8 4.2   CHLORIDE mmol/L  --  106 104   CO2 mmol/L  --  24.5 26.9   ANION GAP mmol/L  --  11.5 8.1   BUN mg/dL  --  12.2 14.2   CREATININE mg/dL 0.92 1.10 0.98   GLUCOSE mg/dL  --  104* 111*   PHOSPHORUS mg/dL  --  3.4  --    ALT (SGPT) U/L  --   --  29   AST (SGOT) U/L  --   --  20   ALK PHOS U/L  --   --  122*        Imaging     XR Chest 1 View  Result Date: 7/17/2025  Impression: 1. Medical support devices as above. No pneumothorax. 2. Low lung volumes with central bronchovascular crowding and left basilar atelectasis. Electronically Signed: Tomy Ruiz MD  7/17/2025 1:02 PM EDT  Workstation ID: [id number]       Chest X ray: My independent assessment showed atelectasis in the left base      Current Medications     Scheduled Meds:  acetaminophen, 1,000 mg, Intravenous, Q8H  aspirin, 81 mg, Oral, Daily  atorvastatin, 40 mg, Oral, Nightly  ceFAZolin, 2 g, Intravenous, Q8H  enoxaparin sodium, 40 mg, Subcutaneous, Q12H  gabapentin, 400 mg, Oral, TID  magnesium sulfate, 1 g, Intravenous, Q8H  mupirocin, 1 Application, Each Nare, BID  pantoprazole, 40 mg, Oral, Q AM  polyethylene glycol, 17 g, Oral, BID  senna-docusate sodium, 2 tablet, Oral, BID  sodium bicarbonate, 50 mEq, Intravenous, Once         Continuous Infusions:  dexmedetomidine, 0.2-1.5 mcg/kg/hr, Last Rate: Stopped (07/17/25 7705)  insulin, 0-100 Units/hr, Last Rate: 2.4 Units/hr (07/18/25 0105)  niCARdipine, 5-15 mg/hr, Last Rate: 10 mg/hr (07/17/25 4436)  nitroglycerin, 5-50 mcg/min  norepinephrine, 0.02-0.06 mcg/kg/min  sodium chloride, 30 mL/hr, Last Rate: 30 mL/hr (07/18/25 0000)         Total critical care time:  33 minutes    Due to a high probability of clinically significant, life threatening deterioration, the patient required my highest level of preparedness to intervene emergently and I personally spent this critical care time directly and personally managing the patient. This critical care time included  obtaining a history; examining the patient; pulse oximetry; ordering and review of studies; arranging urgent treatment with development of a management plan; evaluation of patient's response to treatment; frequent reassessment; and, discussions with other providers.    This critical care time was performed to assess and manage the high probability of imminent, life-threatening deterioration that could result in multi-organ failure. It was exclusive of separately billable procedures and treating other patients and teaching time.      ISIDRO Hernandez   Critical Care  07/18/25   01:38 EDT

## 2025-07-18 NOTE — PLAN OF CARE
Goal Outcome Evaluation:  Plan of Care Reviewed With: patient           Outcome Evaluation: 39 year-old male with PMH of STEMI, CAD s/p PCI, HTN, HLD, tobacco abuse, and a strong family history of premature CAD who has developed worsening shortness of breath and chest tightness over the past few weeks. He was seen in the office by Dr. Richardson on 6/18/25 and it was recommended that he undergo cardiac catheterization. Cardiac catheterization on 7/3/25 revealed severe multivessel CAD with significant in-stent restenosis of the LAD. Patient was subsequently referred to Cardiac Surgery for surgical evaluation and was seen in the office by Dr. Yung on 7/7/25. It was recommended that he undergo CABG. CABG x3 completed on 7/17/25, patient now POD#1. Postprocedure patient extubated well however became increasingly acidotic throughout the evening requiring BiPAP. On 4 liters at time of evaluation. A&Ox4 this date. Pt resides with spouse in [facility] with no steps. He and spouse work. Pt is typically independent with community mobility without AD and performs ADLs independently. Pt educated this date on heart hugger use, sternal precautions, and exercise sheet was left with pt. He was in chair on arrival. Transfers and mobility performed with CGA plus one assist for managing lines and tubes. He does currently require Max A for all dressing and likely bathing tasks. Ot expects ADL deficits to improve as he medically progresses. Recommending home with assist at time of dc, OT will follow closely.    Anticipated Discharge Disposition (OT): home with assist

## 2025-07-18 NOTE — THERAPY EVALUATION
Patient Name: Clark Randolph  : 1985    MRN: [id number]                              Today's Date: 2025       Admit Date: 2025    Visit Dx:     ICD-10-CM ICD-9-CM   1. Coronary artery disease involving native coronary artery of native heart without angina pectoris  I25.10 414.01     Patient Active Problem List   Diagnosis    Coronary heart disease    Hyperlipidemia    Hypertension    Myocardial infarction    Palpitations    Unstable angina    Chest pain    CAD (coronary artery disease)     Past Medical History:   Diagnosis Date    Coronary artery disease     Hyperlipidemia     Hypertension     Myocardial infarction 09/15/2017     Past Surgical History:   Procedure Laterality Date    CARDIAC CATHETERIZATION Right 7/3/2025    Procedure: Left Heart Cath with Coronary Angiography;  Surgeon: Leonard Richardson MD;  Location: [facility] CATH INVASIVE LOCATION;  Service: Cardiovascular;  Laterality: Right;    CORONARY STENT PLACEMENT  2017    NO PAST SURGERIES        General Information       Row Name 25 1559          OT Time and Intention    Mode of Treatment occupational therapy  -       Row Name 25 1559          General Information    Patient Profile Reviewed yes  -LS     Prior Level of Function independent:;ADL's;driving;work;all household mobility  -LS     Existing Precautions/Restrictions cardiac;oxygen therapy device and L/min;sternal;fall  -LS     Barriers to Rehab none identified  -LS       Row Name 25 1559          Living Environment    Current Living Arrangements home  -LS     People in Home spouse  -LS       Row Name 25 1559          Home Main Entrance    Number of Stairs, Main Entrance one  -LS       Row Name 25 1559          Stairs Within Home, Primary    Number of Stairs, Within Home, Primary none  -LS       Row Name 25 1559          Cognition    Orientation Status (Cognition) oriented x 4  -LS       Row Name 25 1559          Safety Issues/Impairments  Affecting Functional Mobility    Impairments Affecting Function (Mobility) endurance/activity tolerance;pain;range of motion (ROM);strength  -               User Key  (r) = Recorded By, (t) = Taken By, (c) = Cosigned By      Initials Name Provider Type    James Rosas OT Occupational Therapist                     Mobility/ADL's       Row Name 07/18/25 1559          Transfers    Transfers sit-stand transfer  -[facility] Name 07/18/25 1559          Sit-Stand Transfer    Sit-Stand [place] (Transfers) contact guard;1 person assist;1 person to manage equipment  -     Comment, (Sit-Stand Transfer) Used heart hugger properly  -[facility] Name 07/18/25 1559          Functional Mobility    Functional Mobility- Ind. Level contact guard assist;1 person + 1 person to manage equipment  -     Functional Mobility- Device walker, front-wheeled  -     Patient was able to Ambulate yes  -[facility] Name 07/18/25 1559          Activities of Daily Living    BADL Assessment/Intervention upper body dressing;lower body dressing  -LS       Row Name 07/18/25 1559          Lower Body Dressing Assessment/Training    [place] Level (Lower Body Dressing) lower body dressing skills;maximum assist (25% patient effort)  -LS       Row Name 07/18/25 1559          Upper Body Dressing Assessment/Training    [place] Level (Upper Body Dressing) upper body dressing skills;maximum assist (25% patient effort)  -               User Key  (r) = Recorded By, (t) = Taken By, (c) = Cosigned By      Initials Name Provider Type    James Rosas OT Occupational Therapist                   Obj/Interventions       Row Name 07/18/25 1600          Sensory Assessment (Somatosensory)    Sensory Assessment LUE fourth and fifth digits are numb  -[facility] Name 07/18/25 1600          Vision Assessment/Intervention    Visual Impairment/Limitations WFL  -[facility] Name 07/18/25 1600          Range of Motion Comprehensive    General  Range of Motion bilateral upper extremity ROM WFL  -LS     Comment, General Range of Motion Within sternal precautions  -LS       Row Name 07/18/25 1600          Strength Comprehensive (MMT)    General Manual Muscle Testing (MMT) Assessment no strength deficits identified  -LS       Row Name 07/18/25 1600          Shoulder (Therapeutic Exercise)    Shoulder (Therapeutic Exercise) AROM (active range of motion)  -LS     Shoulder AROM (Therapeutic Exercise) bilateral;flexion;extension  -LS       Row Name 07/18/25 1600          Elbow/Forearm (Therapeutic Exercise)    Elbow/Forearm (Therapeutic Exercise) AROM (active range of motion)  -LS     Elbow/Forearm AROM (Therapeutic Exercise) bilateral;extension;flexion  -LS       Row Name 07/18/25 1600          Motor Skills    Therapeutic Exercise shoulder;elbow/forearm  -LS       Row Name 07/18/25 1600          Balance    Balance Assessment sitting static balance;sitting dynamic balance;standing static balance;standing dynamic balance  -LS     Static Sitting Balance standby assist  -LS     Dynamic Sitting Balance standby assist  -LS     Position, Sitting Balance supported  -LS     Static Standing Balance contact guard  -LS     Dynamic Standing Balance contact guard  -LS     Position/Device Used, Standing Balance walker, front-wheeled  -LS               User Key  (r) = Recorded By, (t) = Taken By, (c) = Cosigned By      Initials Name Provider Type    LS James Ivy OT Occupational Therapist                   Goals/Plan       Row Name 07/18/25 1613          Bed Mobility Goal 1 (OT)    Activity/Assistive Device (Bed Mobility Goal 1, OT) bed mobility activities, all  -LS     [place] Level/Cues Needed (Bed Mobility Goal 1, OT) modified independence  -     Time Frame (Bed Mobility Goal 1, OT) long term goal (LTG);2 weeks  -       Row Name 07/18/25 1613          Transfer Goal 1 (OT)    Activity/Assistive Device (Transfer Goal 1, OT) transfers, all  -LS     [place]  Level/Cues Needed (Transfer Goal 1, OT) modified independence  -LS     Time Frame (Transfer Goal 1, OT) long term goal (LTG);2 weeks  -LS       Row Name 07/18/25 1613          Dressing Goal 1 (OT)    Activity/Device (Dressing Goal 1, OT) dressing skills, all  -LS     [place]/Cues Needed (Dressing Goal 1, OT) modified independence  -LS     Time Frame (Dressing Goal 1, OT) long term goal (LTG);2 weeks  -LS       Row Name 07/18/25 1613          Toileting Goal 1 (OT)    Activity/Device (Toileting Goal 1, OT) toileting skills, all  -LS     [place] Level/Cues Needed (Toileting Goal 1, OT) modified independence  -LS     Time Frame (Toileting Goal 1, OT) long term goal (LTG);2 weeks  -LS       Row Name 07/18/25 1613          Therapy Assessment/Plan (OT)    Planned Therapy Interventions (OT) activity tolerance training;BADL retraining;functional balance retraining;IADL retraining;occupation/activity based interventions;ROM/therapeutic exercise;strengthening exercise;transfer/mobility retraining;patient/caregiver education/training;neuromuscular control/coordination retraining  -               User Key  (r) = Recorded By, (t) = Taken By, (c) = Cosigned By      Initials Name Provider Type    LS James Ivy OT Occupational Therapist                   Clinical Impression       Row Name 07/18/25 1609          Pain Assessment    Pretreatment Pain Rating 6/10  -LS     Posttreatment Pain Rating 7/10  -LS     Pain Location chest  -       Row Name 07/18/25 1609          Plan of Care Review    Plan of Care Reviewed With patient  -     Outcome Evaluation 39 year-old male with PMH of STEMI, CAD s/p PCI, HTN, HLD, tobacco abuse, and a strong family history of premature CAD who has developed worsening shortness of breath and chest tightness over the past few weeks. He was seen in the office by Dr. Richardson on 6/18/25 and it was recommended that he undergo cardiac catheterization. Cardiac catheterization on 7/3/25 revealed  severe multivessel CAD with significant in-stent restenosis of the LAD. Patient was subsequently referred to Cardiac Surgery for surgical evaluation and was seen in the office by Dr. Yung on 7/7/25. It was recommended that he undergo CABG. CABG x3 completed on 7/17/25, patient now POD#1. Postprocedure patient extubated well however became increasingly acidotic throughout the evening requiring BiPAP. On 4 liters at time of evaluation. A&Ox4 this date. Pt resides with spouse in [facility] with no steps. He and spouse work. Pt is typically independent with community mobility without AD and performs ADLs independently. Pt educated this date on heart hugger use, sternal precautions, and exercise sheet was left with pt. He was in chair on arrival. Transfers and mobility performed with CGA plus one assist for managing lines and tubes. He does currently require Max A for all dressing and likely bathing tasks. Ot expects ADL deficits to improve as he medically progresses. Recommending home with assist at time of dc, OT will follow closely.  -       Row Name 07/18/25 1609          Therapy Assessment/Plan (OT)    Rehab Potential (OT) good  -LS     Criteria for Skilled Therapeutic Interventions Met (OT) yes;skilled treatment is necessary  -     Therapy Frequency (OT) 5 times/wk  -     Predicted Duration of Therapy Intervention (OT) until dc  -       Row Name 07/18/25 1609          Therapy Plan Review/Discharge Plan (OT)    Anticipated Discharge Disposition (OT) home with assist  -       Row Name 07/18/25 1609          Vital Signs    Pre Systolic BP Rehab 126  -LS     Pre Treatment Diastolic BP 73  -LS     Intra Systolic BP Rehab 125  -LS     Intra Treatment Diastolic BP 71  -LS     Post Systolic BP Rehab 114  -LS     Post Treatment Diastolic BP 60  -LS     Pretreatment Heart Rate (beats/min) 74  -LS     Intratreatment Heart Rate (beats/min) 93  -LS     Posttreatment Heart Rate (beats/min) 83  -LS     Pre SpO2 (%) 96   -LS     O2 Delivery Pre Treatment nasal cannula  4L  -LS     Intra SpO2 (%) 93  -LS     O2 Delivery Intra Treatment nasal cannula  -LS     Post SpO2 (%) 95  -LS     O2 Delivery Post Treatment nasal cannula  -LS     Pre Patient Position Sitting  -LS     Intra Patient Position Standing  -LS     Post Patient Position Sitting  -LS       Row Name 07/18/25 1609          Positioning and Restraints    Post Treatment Position chair  -LS     In Chair notified nsg;reclined;call light within reach;encouraged to call for assist;exit alarm on  -LS               User Key  (r) = Recorded By, (t) = Taken By, (c) = Cosigned By      Initials Name Provider Type    LS James Ivy OT Occupational Therapist                   Outcome Measures       Row Name 07/18/25 1613          How much help from another is currently needed...    Putting on and taking off regular lower body clothing? 2  -LS     Bathing (including washing, rinsing, and drying) 2  -LS     Toileting (which includes using toilet bed pan or urinal) 2  -LS     Putting on and taking off regular upper body clothing 2  -LS     Taking care of personal grooming (such as brushing teeth) 3  -LS     Eating meals 4  -LS     AM-PAC 6 Clicks Score (OT) 15  -LS       Row Name 07/18/25 1416          How much help from another person do you currently need...    Turning from your back to your side while in flat bed without using bedrails? 2  -BR     Moving from lying on back to sitting on the side of a flat bed without bedrails? 2  -BR     Moving to and from a bed to a chair (including a wheelchair)? 3  -BR     Standing up from a chair using your arms (e.g., wheelchair, bedside chair)? 3  -BR     Climbing 3-5 steps with a railing? 2  -BR     To walk in hospital room? 3  -BR     AM-PAC 6 Clicks Score (PT) 15  -BR     Highest Level of Mobility Goal Move to Chair/Commode-4  -BR       Row Name 07/18/25 1613 07/18/25 1416       Functional Assessment    Outcome Measure Options AM-PAC 6 Clicks  Daily Activity (OT)  -LS AM-PAC 6 Clicks Basic Mobility (PT)  -BR              User Key  (r) = Recorded By, (t) = Taken By, (c) = Cosigned By      Initials Name Provider Type    BR Taylor Le, PT Physical Therapist    James Rosas OT Occupational Therapist                    Occupational Therapy Education       Title: PT OT SLP Therapies (Done)       Topic: Occupational Therapy (Done)       Point: ADL training (Done)       Learning Progress Summary            Patient Acceptance, E,TB, VU by  at 7/18/2025 1617                      Point: Home exercise program (Done)       Learning Progress Summary            Patient Acceptance, E,TB, VU by  at 7/18/2025 1617                      Point: Precautions (Done)       Learning Progress Summary            Patient Acceptance, E,TB, VU by  at 7/18/2025 1617                      Point: Body mechanics (Done)       Learning Progress Summary            Patient Acceptance, E,TB, VU by  at 7/18/2025 1617                                      User Key       Initials Effective Dates Name Provider Type Discipline     09/22/22 -  James Ivy OT Occupational Therapist OT                  OT Recommendation and Plan  Planned Therapy Interventions (OT): activity tolerance training, BADL retraining, functional balance retraining, IADL retraining, occupation/activity based interventions, ROM/therapeutic exercise, strengthening exercise, transfer/mobility retraining, patient/caregiver education/training, neuromuscular control/coordination retraining  Therapy Frequency (OT): 5 times/wk  Plan of Care Review  Plan of Care Reviewed With: patient  Outcome Evaluation: 39 year-old male with PMH of STEMI, CAD s/p PCI, HTN, HLD, tobacco abuse, and a strong family history of premature CAD who has developed worsening shortness of breath and chest tightness over the past few weeks. He was seen in the office by Dr. Richardson on 6/18/25 and it was recommended that he undergo cardiac  catheterization. Cardiac catheterization on 7/3/25 revealed severe multivessel CAD with significant in-stent restenosis of the LAD. Patient was subsequently referred to Cardiac Surgery for surgical evaluation and was seen in the office by Dr. Yung on 7/7/25. It was recommended that he undergo CABG. CABG x3 completed on 7/17/25, patient now POD#1. Postprocedure patient extubated well however became increasingly acidotic throughout the evening requiring BiPAP. On 4 liters at time of evaluation. A&Ox4 this date. Pt resides with spouse in [facility] with no steps. He and spouse work. Pt is typically independent with community mobility without AD and performs ADLs independently. Pt educated this date on heart hugger use, sternal precautions, and exercise sheet was left with pt. He was in chair on arrival. Transfers and mobility performed with CGA plus one assist for managing lines and tubes. He does currently require Max A for all dressing and likely bathing tasks. Ot expects ADL deficits to improve as he medically progresses. Recommending home with assist at time of dc, OT will follow closely.     Time Calculation:         Time Calculation- OT       Row Name 07/18/25 1617             Time Calculation- OT    OT Start Time 0923  -      OT Stop Time 0951  -      OT Time Calculation (min) 28 min  -      OT Received On 07/18/25  -      OT - Next Appointment 07/21/25  -      OT Goal Re-Cert Due Date 08/01/25  -         Untimed Charges    OT Eval/Re-eval Minutes 28  -LS         Total Minutes    Untimed Charges Total Minutes 28  -LS       Total Minutes 28  -LS                User Key  (r) = Recorded By, (t) = Taken By, (c) = Cosigned By      Initials Name Provider Type    James Rosas OT Occupational Therapist                  Therapy Charges for Today       Code Description Service Date Service Provider Modifiers Qty    [id number] HC OT EVAL HIGH COMPLEXITY 4 7/18/2025 James Ivy OT GO 1                  James Ivy, OT  7/18/2025

## 2025-07-18 NOTE — CASE MANAGEMENT/SOCIAL WORK
Discharge Planning Assessment   [place]     Patient Name: Clark Randolph  MRN: [id number]  Today's Date: 7/18/2025    Admit Date: 7/17/2025    Plan: DC Plan: Pending clinical course and PT/OT elisa. From home with Spouse Milana. CABG 7/17/2025   Discharge Needs Assessment       Row Name 07/18/25 1453       Living Environment    People in Home spouse    Name(s) of People in Home Milana Randolph    Current Living Arrangements home    Potentially Unsafe Housing Conditions none    In the past 12 months has the electric, gas, oil, or water company threatened to shut off services in your home? No    Primary Care Provided by self    Provides Primary Care For no one    Family Caregiver if Needed spouse    Family Caregiver Names Milana Carrolley    Quality of Family Relationships helpful;involved;supportive    Able to Return to Prior Arrangements yes       Resource/Environmental Concerns    Resource/Environmental Concerns none    Transportation Concerns none       Transportation Needs    In the past 12 months, has lack of transportation kept you from medical appointments or from getting medications? no    In the past 12 months, has lack of transportation kept you from meetings, work, or from getting things needed for daily living? No       Food Insecurity    Within the past 12 months, you worried that your food would run out before you got the money to buy more. Never true    Within the past 12 months, the food you bought just didn't last and you didn't have money to get more. Never true       Transition Planning    Patient/Family Anticipates Transition to home with family    Patient/Family Anticipated Services at Transition none    Transportation Anticipated car, drives self;family or friend will provide       Discharge Needs Assessment    Readmission Within the Last 30 Days no previous admission in last 30 days    Equipment Currently Used at Home other (see comments);none    Concerns to be Addressed discharge planning    Do  you want help finding or keeping work or a job? I do not need or want help    Do you want help with school or training? For example, starting or completing job training or getting a high school diploma, GED or equivalent No    Anticipated Changes Related to Illness none    Equipment Needed After Discharge other (see comments)  CM will continue to monitor for needs.    Provided Post Acute Provider List? N/A    Provided Post Acute Provider Quality & Resource List? N/A                   Discharge Plan       Row Name 07/18/25 1454       Plan    Plan DC Plan: Pending clinical course and PT/OT evals. From home with Spouse Milana. CABG 7/17/2025    Patient/Family in Agreement with Plan yes    Provided Post Acute Provider List? N/A    Provided Post Acute Provider Quality & Resource List? N/A    Plan Comments CM spoke with patient/family at bedside to discuss admission assessment and discharge planning. Patient confirms PCP and pharmacy. Patient/family confirms she is agreeable to meds to bed program at this time. CM updated pharmacy in [place]. Patient/family denies any difficulty affording medications or food at this time. Patient/family denies any additional needs for services or DME at this time. Patientfamily reports independent with ADL's and drives. Patient spouse or parents will provide transportation when ready for DC.CM reviewed chart documentation for clinical updates. Awaiting therapy evals.CM will continue to follow for any additional needs. DC Barriers: POD 1 OHS, cardiac monitoring,Central Line, Arterial Line, Pacer Wires, Chest Tubes x2, Insulin gtt, IV abx/electrolytes, and monitor labs.               Expected Discharge Date and Time       Expected Discharge Date Expected Discharge Time    Jul 22, 2025            Demographic Summary       Row Name 07/18/25 1459       General Information    Admission Type inpatient    Arrived From home;operating room    Referral Source admission list    Reason for Consult  discharge planning    Preferred Language English       Contact Information    Permission Granted to Share Info With                    Functional Status       Row Name 07/18/25 1456       Functional Status    Usual Activity Tolerance excellent    Current Activity Tolerance moderate       Physical Activity    On average, how many days per week do you engage in moderate to strenuous exercise (like a brisk walk)? 3 days    On average, how many minutes do you engage in exercise at this level? 150+ min    Number of minutes of exercise per week 450       Functional Status, IADL    Medications independent    Meal Preparation independent    Housekeeping independent    Laundry independent    Shopping independent    If for any reason you need help with day-to-day activities such as bathing, preparing meals, shopping, managing finances, etc., do you get the help you need? 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

## 2025-07-18 NOTE — PLAN OF CARE
Patient up walking with therapy. Doing well on room air. ICU will sign off. Please reach out with any questions or concerns. Thank you!    Electronically signed by ISIDRO Black, 07/18/25, 10:00 AM EDT.

## 2025-07-18 NOTE — PROGRESS NOTES
CARDIOLOGY PROGRESS NOTE      Referring Provider: Berto Yung, *    Reason for follow-up:  cardiovascular management post-CABG     Patient Care Team:  Kwadwo Tovar MD as PCP - General (Family Medicine)  Leonard Richardson MD as Cardiologist (Cardiology)      SUBJECTIVE    The patient is alert and sitting up in the recliner. He states he is sore. He is currently on Cardene drip.        ROS  Review of all systems negative except as indicated.    Since I have last seen, the patient has been without any chest discomfort, shortness of breath, palpitations, dizziness or syncope.  Denies having any headache, abdominal pain, nausea, vomiting, diarrhea, constipation, loss of weight or loss of appetite.  Denies having any excessive bruising, hematuria or blood in the stool.        Personal History:    Past Medical History:   Diagnosis Date    Coronary artery disease     Hyperlipidemia     Hypertension     Myocardial infarction 09/15/2017       Past Surgical History:   Procedure Laterality Date    CARDIAC CATHETERIZATION Right 7/3/2025    Procedure: Left Heart Cath with Coronary Angiography;  Surgeon: Leonard Richardson MD;  Location: [facility] CATH INVASIVE LOCATION;  Service: Cardiovascular;  Laterality: Right;    CORONARY STENT PLACEMENT  09/2017    NO PAST SURGERIES         Family History   Problem Relation Age of Onset    Heart disease Mother     Heart disease Father     Heart failure Father     No Known Problems Sister     Heart disease Brother     Heart failure Brother     Heart attack Brother     No Known Problems Maternal Aunt     No Known Problems Maternal Uncle     No Known Problems Paternal Aunt     No Known Problems Paternal Uncle     No Known Problems Maternal Grandmother     Heart disease Maternal Grandfather     Heart failure Maternal Grandfather     No Known Problems Paternal Grandmother     No Known Problems Paternal Grandfather     No Known Problems Other     Anemia Neg Hx     Arrhythmia Neg Hx     Asthma  Neg Hx     Clotting disorder Neg Hx     Fainting Neg Hx     Hyperlipidemia Neg Hx     Hypertension Neg Hx        Social History     Tobacco Use    Smoking status: Every Day     Current packs/day: 0.50     Average packs/day: 0.5 packs/day for 15.0 years (7.5 ttl pk-yrs)     Types: Cigarettes     Passive exposure: Current    Smokeless tobacco: Never   Vaping Use    Vaping status: Never Used   Substance Use Topics    Alcohol use: No    Drug use: Yes     Types: Marijuana        Home meds:  Prior to Admission medications    Medication Sig Start Date End Date Taking? Authorizing Provider   atorvastatin (LIPITOR) 40 MG tablet Take 1 tablet by mouth Every Night.    Lauro Toledo MD   carvedilol (COREG) 3.125 MG tablet Take 1 tablet by mouth Daily.    Lauro Toledo MD   clopidogrel (PLAVIX) 75 MG tablet Take 1 tablet by mouth Daily. 30 days only needs an appointment for more refills. 10/14/24   Leonard Richardson MD   doxazosin (CARDURA) 2 MG tablet Take 1 tablet by mouth every night at bedtime. 6/2/25   Lauro Toledo MD   gabapentin (NEURONTIN) 400 MG capsule Take 1 capsule by mouth 3 times a day. 5/22/25   Lauro Toledo MD   LORazepam (ATIVAN) 0.5 MG tablet Take 1 tablet by mouth 2 (Two) Times a Day. 7/26/19   Lauro Toledo MD   losartan (COZAAR) 100 MG tablet Take 1 tablet by mouth Daily. 7/24/19   Lauro Toledo MD   mupirocin (BACTROBAN) 2 % ointment Apply to nares (inside each nostril) twice daily as directed for procedure 7/14/25   Berto Yung MD   nitroglycerin (NITROSTAT) 0.4 MG SL tablet 1 under the tongue as needed for angina, may repeat q5mins for up three doses 6/27/25   Pita Hooker APRN   pantoprazole (PROTONIX) 40 MG EC tablet Take 1 tablet by mouth Daily.    Lauro Toledo MD   traMADol (ULTRAM) 50 MG tablet Take 2 tablets by mouth 2 (Two) Times a Day. 7/13/19   Lauro Toledo MD       Allergies:  Metoprolol    Scheduled Meds:aspirin,  "81 mg, Oral, Daily  atorvastatin, 40 mg, Oral, Nightly  ceFAZolin, 2 g, Intravenous, Q8H  enoxaparin sodium, 40 mg, Subcutaneous, Q12H  gabapentin, 400 mg, Oral, TID  magnesium sulfate, 1 g, Intravenous, Q8H  mupirocin, 1 Application, Each Nare, BID  pantoprazole, 40 mg, Oral, Q AM  polyethylene glycol, 17 g, Oral, BID  senna-docusate sodium, 2 tablet, Oral, BID      Continuous Infusions:dexmedetomidine, 0.2-1.5 mcg/kg/hr, Last Rate: Stopped (07/17/25 1455)  insulin, 0-100 Units/hr, Last Rate: 1.5 Units/hr (07/18/25 0721)  niCARdipine, 5-15 mg/hr, Last Rate: 5 mg/hr (07/18/25 0633)  nitroglycerin, 5-50 mcg/min  norepinephrine, 0.02-0.06 mcg/kg/min  sodium chloride, 30 mL/hr, Last Rate: 30 mL/hr (07/18/25 0000)      PRN Meds:.  acetaminophen **OR** acetaminophen **OR** acetaminophen    albumin human    Calcium Replacement - Follow Nurse / BPA Driven Protocol    cyclobenzaprine    dextrose    dextrose    glucagon (human recombinant)    HYDROcodone-acetaminophen    Magnesium Cardiology Dose Replacement - Follow Nurse / BPA Driven Protocol    meperidine    Morphine **AND** naloxone    niCARdipine    nitroglycerin    nitroglycerin    norepinephrine    ondansetron    Phosphorus Replacement - Follow Nurse / BPA Driven Protocol    Potassium Replacement - Follow Nurse / BPA Driven Protocol    sodium chloride    vasopressin      OBJECTIVE    Vital Signs  Vitals:    07/18/25 0545 07/18/25 0600 07/18/25 0615 07/18/25 0630   BP:  130/65  113/68   Pulse: 74 74 74 74   Resp:       Temp: 98.6 °F (37 °C) 98.2 °F (36.8 °C) 98.1 °F (36.7 °C) 98.1 °F (36.7 °C)   TempSrc:       SpO2: 94% 94% 93% 94%   Weight:       Height:           Flowsheet Rows      Flowsheet Row First Filed Value   Admission Height 176.5 cm (69.49\") Documented at 07/17/2025 1145   Admission Weight 128 kg (282 lb 10.1 oz) Documented at 07/17/2025 0600              Intake/Output Summary (Last 24 hours) at 7/18/2025 0732  Last data filed at 7/18/2025 0600  Gross per 24 " hour   Intake 5187 ml   Output 6545 ml   Net -1358 ml          Telemetry:  paced rhythm     Physical Exam:  The patient is alert, oriented and in no distress.  Vital signs as noted above.  Head and neck revealed no carotid bruits or jugular venous distention.  Right IJ central line in place with [place]anna catheter.   Lungs clear with diminished bases.  No wheezing.  On room air. Chest tubes in place.   Heart normal first and second heart sounds.  No murmur. No precordial rub is present.  No gallop is present. Temporary epicardial pacing wires in place.   Abdomen soft and nontender.  No organomegaly is present.  Extremities with good peripheral pulses with BLE edema.   Skin warm and dry.  Sternal surgical incision covered with dry dressing.   Musculoskeletal system is grossly normal.  CNS grossly normal.       Results Review:  I have personally reviewed the results from the time of this admission to 7/18/2025 07:32 EDT and agree with these findings:  [x]  Laboratory  []  Microbiology  [x]  Radiology  [x]  EKG/Telemetry   [x]  Cardiology/Vascular   []  Pathology  [x]  Old records  []  Other:    Most notable findings include:    Lab Results (last 24 hours)       Procedure Component Value Units Date/Time    POC Glucose Once [[id number]]  (Abnormal) Collected: 07/18/25 0720    Specimen: Blood Updated: 07/18/25 0722     Glucose 135 mg/dL      Comment: Serial Number: [id number]:  [id number]       POC Glucose Once [[id number]]  (Abnormal) Collected: 07/18/25 0543    Specimen: Blood Updated: 07/18/25 0545     Glucose 161 mg/dL      Comment: Serial Number: [id number]:  [id number]       Blood Gas, Arterial - [[id number]]  (Abnormal) Collected: 07/18/25 0442    Specimen: Arterial Blood Updated: 07/18/25 0446     Site Arterial Line     Jeronimo's Test N/A     pH, Arterial 7.405 pH units      pCO2, Arterial 44.7 mm Hg      pO2, Arterial 94.4 mm Hg      HCO3, Arterial 28.0 mmol/L      Base Excess, Arterial 2.8 mmol/L       Comment: Serial Number: [id number]:  [id number]        O2 Saturation, Arterial 97.3 %      CO2 Content 29.4 mmol/L      Barometric Pressure for Blood Gas --     Comment: N/A        Modality BiPap     FIO2 50 %      Ventilator Mode BIVENT     Set Tidal Volume 545     PEEP 5     Hemodilution No     Respiratory Rate 18     PO2/FIO2 189    POC Glucose Once [[id number]]  (Abnormal) Collected: 07/18/25 0442    Specimen: Arterial Blood Updated: 07/18/25 0446     Glucose 154 mg/dL      Comment: Serial Number: [id number]:  [id number]       POC Glucose Once [[id number]]  (Abnormal) Collected: 07/18/25 0409    Specimen: Blood Updated: 07/18/25 0414     Glucose 155 mg/dL      Comment: Serial Number: [id number]:  [id number]       CBC & Differential [[id number]]  (Abnormal) Collected: 07/18/25 0300    Specimen: Blood Updated: 07/18/25 0355    Narrative:      The following orders were created for panel order CBC & Differential.  Procedure                               Abnormality         Status                     ---------                               -----------         ------                     CBC Auto Differential[[id number]]        Abnormal            Final result               Scan Slide[[id number]]                                       Final result                 Please view results for these tests on the individual orders.    Manual Differential [[id number]]  (Abnormal) Collected: 07/18/25 0300    Specimen: Blood Updated: 07/18/25 0355     Neutrophil % 77.0 %      Lymphocyte % 3.0 %      Monocyte % 9.0 %      Basophil % 1.0 %      Bands %  10.0 %      Neutrophils Absolute 13.11 10*3/mm3      Lymphocytes Absolute 0.45 10*3/mm3      Monocytes Absolute 1.36 10*3/mm3      Basophils Absolute 0.15 10*3/mm3      RBC Morphology Normal     Vacuolated Neutrophils Slight/1+     Large Platelets Slight/1+    CBC Auto Differential [[id number]]  (Abnormal) Collected: 07/18/25 0300    Specimen: Blood Updated: 07/18/25 0355     WBC 15.07  10*3/mm3      RBC 4.20 10*6/mm3      Hemoglobin 12.4 g/dL      Hematocrit 36.5 %      MCV 86.9 fL      MCH 29.5 pg      MCHC 34.0 g/dL      RDW 12.9 %      RDW-SD 40.7 fl      MPV 9.9 fL      Platelets 161 10*3/mm3     Narrative:      The previously reported component NRBC is no longer being reported. Previous result was 0.0 /100 WBC (Reference Range: 0.0-0.2 /100 WBC) on 7/18/2025 at 0308 EDT.    Scan Slide [[id number]] Collected: 07/18/25 0300    Specimen: Blood Updated: 07/18/25 0355     Scan Slide --     Comment: See Manual Differential Results       Renal Function Panel [[id number]]  (Abnormal) Collected: 07/18/25 0300    Specimen: Blood Updated: 07/18/25 0334     Glucose 170 mg/dL      BUN 17.0 mg/dL      Creatinine 1.04 mg/dL      Sodium 140 mmol/L      Potassium 4.3 mmol/L      Chloride 107 mmol/L      CO2 21.7 mmol/L      Calcium 9.1 mg/dL      Albumin 4.8 g/dL      Phosphorus 2.6 mg/dL      Anion Gap 11.3 mmol/L      BUN/Creatinine Ratio 16.3     eGFR 93.1 mL/min/1.73     Narrative:      GFR Categories in Chronic Kidney Disease (CKD)              GFR Category          GFR (mL/min/1.73)    Interpretation  G1                    90 or greater        Normal or high (1)  G2                    60-89                Mild decrease (1)  G3a                   45-59                Mild to moderate decrease  G3b                   30-44                Moderate to severe decrease  G4                    15-29                Severe decrease  G5                    14 or less           Kidney failure    (1)In the absence of evidence of kidney disease, neither GFR category G1 or G2 fulfill the criteria for CKD.    eGFR calculation 2021 CKD-EPI creatinine equation, which does not include race as a factor    Magnesium [[id number]]  (Abnormal) Collected: 07/18/25 0300    Specimen: Blood Updated: 07/18/25 0334     Magnesium 2.7 mg/dL     Protime-INR [[id number]]  (Abnormal) Collected: 07/18/25 0300    Specimen: Blood Updated:  07/18/25 0314     Protime 15.3 Seconds      INR 1.21    Blood Gas, Arterial - [[id number]]  (Abnormal) Collected: 07/18/25 0301    Specimen: Arterial Blood Updated: 07/18/25 0304     Site Arterial Line     Jeronimo's Test N/A     pH, Arterial 7.469 pH units      pCO2, Arterial 40.3 mm Hg      pO2, Arterial 67.0 mm Hg      HCO3, Arterial 29.3 mmol/L      Base Excess, Arterial 5.1 mmol/L      Comment: Serial Number: [id number]:  [id number]        O2 Saturation, Arterial 94.1 %      Barometric Pressure for Blood Gas --     Comment: N/A        Modality BiPap     FIO2 50 %      Ventilator Mode BIVENT     Set Tidal Volume 650     PEEP 5     Hemodilution No     Respiratory Rate 18     PO2/FIO2 134    POC Glucose Once [[id number]]  (Abnormal) Collected: 07/18/25 0301    Specimen: Arterial Blood Updated: 07/18/25 0304     Glucose 187 mg/dL      Comment: Serial Number: [id number]:  [id number]       POC Glucose Once [[id number]]  (Abnormal) Collected: 07/18/25 0157    Specimen: Blood Updated: 07/18/25 0159     Glucose 173 mg/dL      Comment: Serial Number: [id number]:  [id number]       POC Electrolyte Panel [[id number]]  (Abnormal) Collected: 07/18/25 0057    Specimen: Arterial Blood Updated: 07/18/25 0103     Sodium 144 mmol/L      POC Potassium 4.2 mmol/L      Chloride 112 mmol/L      Ionized Calcium 1.18 mmol/L      Comment: Serial Number: [id number]:  [id number]       POC Glucose Once [[id number]]  (Abnormal) Collected: 07/18/25 0057    Specimen: Arterial Blood Updated: 07/18/25 0103     Glucose 180 mg/dL      Comment: Serial Number: [id number]:  [id number]       POC Creatinine [[id number]]  (Normal) Collected: 07/18/25 0057    Specimen: Arterial Blood Updated: 07/18/25 0103     Creatinine 0.92 mg/dL      Comment: Serial Number: [id number]:  [id number]        eGFR 107.8 mL/min/1.73     Blood Gas, Arterial - [[id number]]  (Abnormal) Collected: 07/18/25 0057    Specimen: Arterial Blood Updated: 07/18/25 0103     Site Arterial Line      Jeronimo's Test N/A     pH, Arterial 7.284 pH units      pCO2, Arterial 52.7 mm Hg      pO2, Arterial 68.9 mm Hg      HCO3, Arterial 25.0 mmol/L      Base Excess, Arterial -2.3 mmol/L      Comment: Serial Number: [id number]:  [id number]        O2 Saturation, Arterial 90.8 %      Barometric Pressure for Blood Gas --     Comment: N/A        Modality Cannula     FIO2 48 %      Hemodilution No     PO2/FIO2 144    POC Glucose Once [[id number]]  (Abnormal) Collected: 07/17/25 2354    Specimen: Blood Updated: 07/17/25 2359     Glucose 180 mg/dL      Comment: Serial Number: [id number]:  [id number]       Blood Gas, Arterial - [[id number]]  (Abnormal) Collected: 07/17/25 2327    Specimen: Arterial Blood Updated: 07/17/25 2332     Site Arterial Line     Jeronimo's Test N/A     pH, Arterial 7.268 pH units      pCO2, Arterial 57.9 mm Hg      pO2, Arterial 68.4 mm Hg      HCO3, Arterial 26.4 mmol/L      Base Excess, Arterial -1.4 mmol/L      Comment: Serial Number: [id number]:  [id number]        O2 Saturation, Arterial 90.1 %      CO2 Content 28.2 mmol/L      Barometric Pressure for Blood Gas --     Comment: N/A        Modality Cannula     FIO2 36 %      Hemodilution No     PO2/FIO2 190    POC Glucose Once [[id number]]  (Abnormal) Collected: 07/17/25 2327    Specimen: Arterial Blood Updated: 07/17/25 2332     Glucose 179 mg/dL      Comment: Serial Number: [id number]:  [id number]       Blood Gas, Arterial - [[id number]]  (Abnormal) Collected: 07/17/25 2158    Specimen: Arterial Blood Updated: 07/17/25 2203     Site Arterial Line     Jeronimo's Test N/A     pH, Arterial 7.361 pH units      pCO2, Arterial 48.3 mm Hg      pO2, Arterial 98.8 mm Hg      HCO3, Arterial 27.4 mmol/L      Base Excess, Arterial 1.4 mmol/L      Comment: Serial Number: [id number]:  [id number]        O2 Saturation, Arterial 97.3 %      CO2 Content 28.8 mmol/L      Barometric Pressure for Blood Gas --     Comment: N/A        Modality Adult Vent     FIO2 40 %       Ventilator Mode CPAP/PS     PSV 10 cmH2O      Hemodilution No     PO2/FIO2 247    POC Glucose Once [[id number]]  (Abnormal) Collected: 07/17/25 2158    Specimen: Arterial Blood Updated: 07/17/25 2203     Glucose 170 mg/dL      Comment: Serial Number: [id number]:  [id number]       POC Glucose Once [[id number]]  (Abnormal) Collected: 07/17/25 2108    Specimen: Arterial Blood Updated: 07/17/25 2115     Glucose 149 mg/dL      Comment: Serial Number: [id number]:  [id number]       Blood Gas, Arterial - [[id number]] Collected: 07/17/25 2108    Specimen: Arterial Blood Updated: 07/17/25 2115     Site Arterial Line     Jeronimo's Test N/A     pH, Arterial 7.401 pH units      pCO2, Arterial 43.1 mm Hg      pO2, Arterial 100.4 mm Hg      HCO3, Arterial 26.8 mmol/L      Base Excess, Arterial 1.7 mmol/L      Comment: Serial Number: [id number]:  [id number]        O2 Saturation, Arterial 97.7 %      Barometric Pressure for Blood Gas --     Comment: N/A        Modality Adult Vent     FIO2 40 %      Ventilator Mode AC     Set Tidal Volume 650     PEEP 5     Hemodilution No     Respiratory Rate 18     PO2/FIO2 251    Blood Gas, Arterial - [[id number]]  (Abnormal) Collected: 07/17/25 1953    Specimen: Arterial Blood Updated: 07/17/25 2001     Site Arterial Line     Jeronimo's Test N/A     pH, Arterial 7.315 pH units      pCO2, Arterial 52.8 mm Hg      pO2, Arterial 98.2 mm Hg      HCO3, Arterial 26.9 mmol/L      Base Excess, Arterial 0.0 mmol/L      Comment: Serial Number: [id number]:  [id number]        O2 Saturation, Arterial 96.8 %      CO2 Content 28.5 mmol/L      Barometric Pressure for Blood Gas --     Comment: N/A        Modality Adult Vent     FIO2 40 %      Ventilator Mode CPAP/PS     PEEP 5     Hemodilution No     Respiratory Rate 13     PO2/FIO2 246    Blood Gas, Arterial - [[id number]] Collected: 07/17/25 1914    Specimen: Arterial Blood Updated: 07/17/25 1919     Site Arterial Line     Jeronimo's Test N/A     pH, Arterial 7.429 pH units       pCO2, Arterial 40.7 mm Hg      pO2, Arterial 94.7 mm Hg      HCO3, Arterial 27.0 mmol/L      Base Excess, Arterial 2.4 mmol/L      Comment: Serial Number: [id number]:  [id number]        O2 Saturation, Arterial 97.6 %      Barometric Pressure for Blood Gas --     Comment: N/A        Modality Adult Vent     FIO2 40 %      Ventilator Mode AC     Set Tidal Volume 650     PEEP 5     Hemodilution No     Respiratory Rate 18     PO2/FIO2 237    POC Glucose Once [[id number]]  (Abnormal) Collected: 07/17/25 1914    Specimen: Arterial Blood Updated: 07/17/25 1919     Glucose 132 mg/dL      Comment: Serial Number: [id number]:  [id number]       Blood Gas, Arterial - [[id number]]  (Abnormal) Collected: 07/17/25 1825    Specimen: Arterial Blood Updated: 07/17/25 1830     Site Arterial Line     Jeronimo's Test N/A     pH, Arterial 7.381 pH units      pCO2, Arterial 46.9 mm Hg      pO2, Arterial 89.2 mm Hg      HCO3, Arterial 27.8 mmol/L      Base Excess, Arterial 2.1 mmol/L      Comment: Serial Number: [id number]:  [id number]        O2 Saturation, Arterial 96.6 %      CO2 Content 29.2 mmol/L      Barometric Pressure for Blood Gas --     Comment: N/A        Modality Adult Vent     FIO2 40 %      Ventilator Mode AC     Set Tidal Volume 650     PEEP 5     Hemodilution No     Respiratory Rate 18     PO2/FIO2 223    CBC (No Diff) [[id number]]  (Abnormal) Collected: 07/17/25 1808    Specimen: Blood Updated: 07/17/25 1815     WBC 14.56 10*3/mm3      RBC 4.00 10*6/mm3      Hemoglobin 11.7 g/dL      Hematocrit 34.6 %      MCV 86.5 fL      MCH 29.3 pg      MCHC 33.8 g/dL      RDW 12.6 %      RDW-SD 39.8 fl      MPV 9.8 fL      Platelets 167 10*3/mm3     POC Glucose Once [[id number]]  (Abnormal) Collected: 07/17/25 1809    Specimen: Blood Updated: 07/17/25 1812     Glucose 133 mg/dL      Comment: Serial Number: [id number]:  [id number]       Blood Gas, Arterial - [[id number]]  (Abnormal) Collected: 07/17/25 1711    Specimen: Arterial Blood  Updated: 07/17/25 1720     Site Arterial Line     Jeronimo's Test N/A     pH, Arterial 7.285 pH units      pCO2, Arterial 58.7 mm Hg      pO2, Arterial 89.2 mm Hg      HCO3, Arterial 27.9 mmol/L      Base Excess, Arterial 0.3 mmol/L      Comment: Serial Number: [id number]:  [id number]        O2 Saturation, Arterial 95.4 %      Barometric Pressure for Blood Gas --     Comment: N/A        Modality Adult Vent     FIO2 40 %      Ventilator Mode AC     PSV 8 cmH2O      Hemodilution No     PO2/FIO2 223    POC Glucose Once [[id number]]  (Abnormal) Collected: 07/17/25 1711    Specimen: Arterial Blood Updated: 07/17/25 1720     Glucose 131 mg/dL      Comment: Serial Number: [id number]:  [id number]       POC Glucose Once [[id number]]  (Abnormal) Collected: 07/17/25 1631    Specimen: Arterial Blood Updated: 07/17/25 1635     Glucose 127 mg/dL      Comment: Serial Number: [id number]:  [id number]       Blood Gas, Arterial - [[id number]]  (Abnormal) Collected: 07/17/25 1631    Specimen: Arterial Blood Updated: 07/17/25 1635     Site Arterial Line     Jeronimo's Test N/A     pH, Arterial 7.264 pH units      pCO2, Arterial 61.8 mm Hg      pO2, Arterial 111.5 mm Hg      HCO3, Arterial 28.0 mmol/L      Base Excess, Arterial -0.2 mmol/L      Comment: Serial Number: [id number]:  [id number]        O2 Saturation, Arterial 97.4 %      Barometric Pressure for Blood Gas --     Comment: N/A        Modality Adult Vent     FIO2 50 %      Ventilator Mode AC     PSV 0 cmH2O      Hemodilution No     PO2/FIO2 223    POC Glucose Once [[id number]]  (Abnormal) Collected: 07/17/25 1604    Specimen: Blood Updated: 07/17/25 1606     Glucose 121 mg/dL      Comment: Serial Number: [id number]:  [id number]       Blood Gas, Arterial - [[id number]]  (Abnormal) Collected: 07/17/25 1440    Specimen: Arterial Blood Updated: 07/17/25 1444     Site Arterial Line     Jeronimo's Test N/A     pH, Arterial 7.342 pH units      pCO2, Arterial 50.3 mm Hg      pO2, Arterial 82.9 mm  Hg      HCO3, Arterial 27.2 mmol/L      Base Excess, Arterial 0.8 mmol/L      Comment: Serial Number: [id number]:  [id number]        O2 Saturation, Arterial 95.3 %      Barometric Pressure for Blood Gas --     Comment: N/A        Modality Adult Vent     FIO2 50 %      Ventilator Mode AC     Set Tidal Volume 700     PEEP 5     Hemodilution No     Respiratory Rate 16     PO2/FIO2 166    POC Glucose Once [[id number]]  (Abnormal) Collected: 07/17/25 1440    Specimen: Arterial Blood Updated: 07/17/25 1444     Glucose 124 mg/dL      Comment: Serial Number: [id number]:  [id number]       Blood Gas, Arterial - [[id number]] Collected: 07/17/25 1313    Specimen: Arterial Blood Updated: 07/17/25 1316     Site Arterial Line     Jeronimo's Test N/A     pH, Arterial 7.404 pH units      pCO2, Arterial 43.5 mm Hg      pO2, Arterial 90.6 mm Hg      HCO3, Arterial 27.2 mmol/L      Base Excess, Arterial 2.1 mmol/L      Comment: Serial Number: [id number]:  [id number]        O2 Saturation, Arterial 97.0 %      Barometric Pressure for Blood Gas --     Comment: N/A        Modality Adult Vent     FIO2 50 %      Ventilator Mode AC     Set Tidal Volume 700     PEEP 8     Hemodilution No     Respiratory Rate 16     PO2/FIO2 181    POC Glucose Once [[id number]]  (Abnormal) Collected: 07/17/25 1313    Specimen: Arterial Blood Updated: 07/17/25 1316     Glucose 111 mg/dL      Comment: Serial Number: [id number]:  [id number]       Renal Function Panel [[id number]]  (Abnormal) Collected: 07/17/25 1230    Specimen: Blood Updated: 07/17/25 1303     Glucose 104 mg/dL      BUN 12.2 mg/dL      Creatinine 1.10 mg/dL      Sodium 142 mmol/L      Potassium 4.8 mmol/L      Chloride 106 mmol/L      CO2 24.5 mmol/L      Calcium 9.4 mg/dL      Albumin 4.7 g/dL      Phosphorus 3.4 mg/dL      Anion Gap 11.5 mmol/L      BUN/Creatinine Ratio 11.1     eGFR 87.0 mL/min/1.73     Narrative:      GFR Categories in Chronic Kidney Disease (CKD)              GFR Category          GFR  (mL/min/1.73)    Interpretation  G1                    90 or greater        Normal or high (1)  G2                    60-89                Mild decrease (1)  G3a                   45-59                Mild to moderate decrease  G3b                   30-44                Moderate to severe decrease  G4                    15-29                Severe decrease  G5                    14 or less           Kidney failure    (1)In the absence of evidence of kidney disease, neither GFR category G1 or G2 fulfill the criteria for CKD.    eGFR calculation 2021 CKD-EPI creatinine equation, which does not include race as a factor    Protime-INR [[id number]]  (Abnormal) Collected: 07/17/25 1230    Specimen: Blood Updated: 07/17/25 1248     Protime 16.2 Seconds      INR 1.30    aPTT [[id number]]  (Normal) Collected: 07/17/25 1230    Specimen: Blood Updated: 07/17/25 1248     PTT 35.2 seconds     CBC & Differential [[id number]]  (Abnormal) Collected: 07/17/25 1230    Specimen: Blood Updated: 07/17/25 1239    Narrative:      The following orders were created for panel order CBC & Differential.  Procedure                               Abnormality         Status                     ---------                               -----------         ------                     CBC Auto Differential[[id number]]        Abnormal            Final result                 Please view results for these tests on the individual orders.    CBC Auto Differential [[id number]]  (Abnormal) Collected: 07/17/25 1230    Specimen: Blood Updated: 07/17/25 1239     WBC 15.14 10*3/mm3      RBC 4.32 10*6/mm3      Hemoglobin 12.7 g/dL      Hematocrit 37.1 %      MCV 85.9 fL      MCH 29.4 pg      MCHC 34.2 g/dL      RDW 12.4 %      RDW-SD 39.0 fl      MPV 9.6 fL      Platelets 150 10*3/mm3      Neutrophil % 78.1 %      Lymphocyte % 11.0 %      Monocyte % 9.5 %      Eosinophil % 0.4 %      Basophil % 0.5 %      Immature Grans % 0.5 %      Neutrophils, Absolute 11.82 10*3/mm3       Lymphocytes, Absolute 1.66 10*3/mm3      Monocytes, Absolute 1.44 10*3/mm3      Eosinophils, Absolute 0.06 10*3/mm3      Basophils, Absolute 0.08 10*3/mm3      Immature Grans, Absolute 0.08 10*3/mm3      nRBC 0.0 /100 WBC     Calcium, Ionized [[id number]]  (Abnormal) Collected: 07/17/25 1230    Specimen: Blood Updated: 07/17/25 1238     Ionized Calcium 1.14 mmol/L     Blood Gas, Arterial - [[id number]]  (Abnormal) Collected: 07/17/25 1200    Specimen: Arterial Blood Updated: 07/17/25 1203     Site Arterial Line     Jeronimo's Test N/A     pH, Arterial 7.343 pH units      pCO2, Arterial 52.2 mm Hg      pO2, Arterial 109.7 mm Hg      HCO3, Arterial 28.4 mmol/L      Base Excess, Arterial 1.9 mmol/L      Comment: Serial Number: [id number]:  [id number]        O2 Saturation, Arterial 97.9 %      Barometric Pressure for Blood Gas --     Comment: N/A        Modality Adult Vent     FIO2 70 %      Ventilator Mode AC     Set Tidal Volume 700     PEEP 8     Hemodilution No     Respiratory Rate 14     PO2/FIO2 157    POC Glucose Once [[id number]]  (Abnormal) Collected: 07/17/25 1200    Specimen: Arterial Blood Updated: 07/17/25 1203     Glucose 103 mg/dL      Comment: Serial Number: [id number]:  [id number]       POC Activated Clotting Time [[id number]]  (Normal) Collected: 07/17/25 1035    Specimen: Blood Updated: 07/17/25 1052     Activated Clotting Time  124 Seconds      Comment: Serial Number: [id number]:  [id number]       POC Chem 8, arterial (ISTAT) [[id number]]  (Abnormal) Collected: 07/17/25 1022    Specimen: Arterial Blood Updated: 07/17/25 1052     Ionized Calcium 1.86 mmol/L      pH, Arterial 7.300 pH units      pCO2, Arterial 52.8 mm Hg      pO2, Arterial 335.0 mm Hg      HCO3, Arterial 26.2 mmol/L      Base Excess, Arterial 0.0 mmol/L      Base Deficit --     O2 Saturation, Arterial 100.0 %      Glucose 148 mg/dL      Comment: Serial Number: [id number]:  [id number]        Sodium 134 mmol/L      POC Potassium 6.7 mmol/L       Hematocrit 33 %      Hemoglobin 11.2 g/dL      CO2 Content 28 mmol/L     POC Chem 8, arterial (ISTAT) [[id number]]  (Abnormal) Collected: 07/17/25 1002    Specimen: Arterial Blood Updated: 07/17/25 1052     Ionized Calcium 1.08 mmol/L      pH, Arterial 7.270 pH units      pCO2, Arterial 59.4 mm Hg      pO2, Arterial 358.0 mm Hg      HCO3, Arterial 27.4 mmol/L      Base Excess, Arterial 1.0 mmol/L      Base Deficit --     O2 Saturation, Arterial 100.0 %      Glucose 167 mg/dL      Comment: Serial Number: [id number]:  [id number]        Sodium 135 mmol/L      POC Potassium 6.6 mmol/L      Hematocrit 35 %      Hemoglobin 11.9 g/dL      CO2 Content 29 mmol/L     POC Chem 8, arterial (ISTAT) [[id number]]  (Abnormal) Collected: 07/17/25 0946    Specimen: Arterial Blood Updated: 07/17/25 1051     Ionized Calcium 1.07 mmol/L      pH, Arterial 7.270 pH units      pCO2, Arterial 62.5 mm Hg      pO2, Arterial 374.0 mm Hg      HCO3, Arterial 28.5 mmol/L      Base Excess, Arterial 2.0 mmol/L      Base Deficit --     O2 Saturation, Arterial 100.0 %      Glucose 160 mg/dL      Comment: Serial Number: [id number]:  [id number]        Sodium 135 mmol/L      POC Potassium 6.7 mmol/L      Hematocrit 33 %      Hemoglobin 11.2 g/dL      CO2 Content 30 mmol/L     POC Chem 8, arterial (ISTAT) [[id number]]  (Abnormal) Collected: 07/17/25 0926    Specimen: Arterial Blood Updated: 07/17/25 1051     Ionized Calcium 1.08 mmol/L      pH, Arterial 7.240 pH units      pCO2, Arterial 64.4 mm Hg      pO2, Arterial 384.0 mm Hg      HCO3, Arterial 27.4 mmol/L      Base Excess, Arterial 0.0 mmol/L      Base Deficit --     O2 Saturation, Arterial 100.0 %      Glucose 143 mg/dL      Comment: Serial Number: [id number]:  [id number]        Sodium 133 mmol/L      POC Potassium 5.9 mmol/L      Hematocrit 32 %      Hemoglobin 10.9 g/dL      CO2 Content 29 mmol/L     POC Chem Panel [[id number]]  (Abnormal) Collected: 07/17/25 0922    Specimen: Venous Blood Updated:  07/17/25 1051     Glucose 136 mg/dL      Comment: Serial Number: [id number]:  [id number]        Sodium 134 mmol/L      POC Potassium 5.7 mmol/L      Ionized Calcium 1.07 mmol/L      Hematocrit 31 %      Hemoglobin 10.5 g/dL      pH, Venous 7.230 pH Units      pCO2, Venous 67.0 mm Hg      CO2 CONTENT  30 mmol/L      HCO3, Venous 28.3 mmol/L      Base Excess, Venous 1.0 mmol/L      Base Deficit --     O2 Saturation, Venous --     pO2, Venous 45.0 mm Hg     POC Activated Clotting Time [[id number]]  (Abnormal) Collected: 07/17/25 0901    Specimen: Arterial Blood Updated: 07/17/25 1051     Activated Clotting Time  504 Seconds      Comment: Serial Number: [id number]:  [id number]       POC Activated Clotting Time [[id number]]  (Abnormal) Collected: 07/17/25 1007    Specimen: Arterial Blood Updated: 07/17/25 1050     Activated Clotting Time  458 Seconds      Comment: Serial Number: [id number]:  [id number]       POC Activated Clotting Time [[id number]]  (Abnormal) Collected: 07/17/25 0950    Specimen: Arterial Blood Updated: 07/17/25 1050     Activated Clotting Time  383 Seconds      Comment: Serial Number: [id number]:  [id number]       POC Chem 8, arterial (ISTAT) [[id number]]  (Abnormal) Collected: 07/17/25 0730    Specimen: Arterial Blood Updated: 07/17/25 1050     Ionized Calcium 1.22 mmol/L      pH, Arterial 7.260 pH units      pCO2, Arterial 59.1 mm Hg      pO2, Arterial 500.0 mm Hg      HCO3, Arterial 26.6 mmol/L      Base Excess, Arterial 0.0 mmol/L      Base Deficit --     O2 Saturation, Arterial 100.0 %      Glucose 118 mg/dL      Comment: Serial Number: [id number]:  [id number]        Sodium 139 mmol/L      POC Potassium 4.4 mmol/L      Hematocrit 39 %      Hemoglobin 13.3 g/dL      CO2 Content 28 mmol/L     POC Chem 8, arterial (ISTAT) [[id number]]  (Abnormal) Collected: 07/17/25 1034    Specimen: Arterial Blood Updated: 07/17/25 1049     Ionized Calcium 1.44 mmol/L      pH, Arterial 7.280 pH units      pCO2, Arterial  55.8 mm Hg      pO2, Arterial 129.0 mm Hg      HCO3, Arterial 25.9 mmol/L      Base Excess, Arterial <0.0 mmol/L      Base Deficit --     O2 Saturation, Arterial 98.0 %      Glucose 139 mg/dL      Comment: Serial Number: [id number]:  [id number]        Sodium 137 mmol/L      POC Potassium 5.7 mmol/L      Hematocrit 33 %      Hemoglobin 11.2 g/dL      CO2 Content 28 mmol/L     POC Activated Clotting Time [[id number]]  (Abnormal) Collected: 07/17/25 0926    Specimen: Venous Blood Updated: 07/17/25 1049     Activated Clotting Time  383 Seconds      Comment: Serial Number: [id number]:  [id number]       POC Activated Clotting Time [[id number]]  (Normal) Collected: 07/17/25 0731    Specimen: Arterial Blood Updated: 07/17/25 1049     Activated Clotting Time  135 Seconds      Comment: Serial Number: [id number]:  [id number]               Imaging Results (Last 24 Hours)       Procedure Component Value Units Date/Time    XR Chest 1 View - In process [[id number]] Resulted: 07/18/25 0537     Updated: 07/18/25 0537    This result has not been signed. Information might be incomplete.      XR Chest 1 View [[id number]] Collected: 07/17/25 1301     Updated: 07/17/25 1305    Narrative:      XR CHEST 1 VW    Date of Exam: 7/17/2025 12:34 PM EDT    Indication: Post-Op Check Line & Tube Placement    Comparison: Chest radiograph 7/15/2025    Findings:  ET tube 5.6 cm above santo terminating over upper third thoracic trachea. Gastric tube is below diaphragm. Median sternotomy and CABG. Right IJ approach Jemison-Jeanna catheter terminates over distal right pulmonary artery. Cardiac silhouette mildly   enlarged. Lung volumes are low with central bronchovascular crowding. Platelike atelectasis in the left lower lobe. No significant edema, large effusion or pneumothorax. Thoracotomy tube noted.      Impression:      Impression:  1. Medical support devices as above. No pneumothorax.  2. Low lung volumes with central bronchovascular crowding and left  basilar atelectasis.      Electronically Signed: Tomy Ruiz MD    7/17/2025 1:02 PM EDT    Workstation ID: [id number]            LAB RESULTS (LAST 7 DAYS)    CBC  Results from last 7 days   Lab Units 07/18/25  0300 07/17/25  1808 07/17/25  1230 07/17/25  1034 07/17/25  1022 07/17/25  1002 07/17/25  0946 07/17/25  0730 07/15/25  0823   WBC 10*3/mm3 15.07* 14.56* 15.14*  --   --   --   --   --  9.76   RBC 10*6/mm3 4.20 4.00* 4.32  --   --   --   --   --  5.12   HEMOGLOBIN g/dL 12.4* 11.7* 12.7*  --   --   --   --   --  14.9   HEMOGLOBIN, POC g/dL  --   --   --  11.2* 11.2* 11.9* 11.2*   < >  --    HEMATOCRIT % 36.5* 34.6* 37.1*  --   --   --   --   --  44.7   HEMATOCRIT POC %  --   --   --  33* 33* 35* 33*   < >  --    MCV fL 86.9 86.5 85.9  --   --   --   --   --  87.3   PLATELETS 10*3/mm3 161 167 150  --   --   --   --   --  249    < > = values in this interval not displayed.       BMP  Results from last 7 days   Lab Units 07/18/25 0300 07/18/25  0057 07/17/25  1230 07/15/25  0823   SODIUM mmol/L 140  --  142 139   POTASSIUM mmol/L 4.3  --  4.8 4.2   CHLORIDE mmol/L 107  --  106 104   CO2 mmol/L 21.7*  --  24.5 26.9   BUN mg/dL 17.0  --  12.2 14.2   CREATININE mg/dL 1.04 0.92 1.10 0.98   GLUCOSE mg/dL 170*  --  104* 111*   MAGNESIUM mg/dL 2.7*  --   --   --    PHOSPHORUS mg/dL 2.6  --  3.4  --        CMP   Results from last 7 days   Lab Units 07/18/25 0300 07/18/25  0057 07/17/25  1230 07/15/25  0823   SODIUM mmol/L 140  --  142 139   POTASSIUM mmol/L 4.3  --  4.8 4.2   CHLORIDE mmol/L 107  --  106 104   CO2 mmol/L 21.7*  --  24.5 26.9   BUN mg/dL 17.0  --  12.2 14.2   CREATININE mg/dL 1.04 0.92 1.10 0.98   GLUCOSE mg/dL 170*  --  104* 111*   ALBUMIN g/dL 4.8  --  4.7 4.2   BILIRUBIN mg/dL  --   --   --  0.4   ALK PHOS U/L  --   --   --  122*   AST (SGOT) U/L  --   --   --  20   ALT (SGPT) U/L  --   --   --  29       BNP        TROPONIN        CoAg  Results from last 7 days   Lab Units 07/18/25  0300  07/17/25  1230 07/15/25  0823   INR  1.21* 1.30* 0.95   APTT seconds  --  35.2 30.3       Creatinine Clearance  Estimated Creatinine Clearance: 127.5 mL/min (by C-G formula based on SCr of 1.04 mg/dL).    ABG  Results from last 7 days   Lab Units 07/18/25  0442 07/18/25  0301 07/18/25  0057 07/17/25  2327 07/17/25  2158 07/17/25  2108 07/17/25  1953   PH, ARTERIAL pH units 7.405 7.469* 7.284* 7.268* 7.361 7.401 7.315*   PCO2, ARTERIAL mm Hg 44.7 40.3 52.7* 57.9* 48.3* 43.1 52.8*   PO2 ART mm Hg 94.4 67.0* 68.9* 68.4* 98.8 100.4 98.2   O2 SATURATION ART % 97.3 94.1 90.8* 90.1* 97.3 97.7 96.8   BASE EXCESS ART mmol/L 2.8 5.1* -2.3* -1.4* 1.4 1.7 0.0       Radiology  XR Chest 1 View  Result Date: 7/17/2025  Impression: 1. Medical support devices as above. No pneumothorax. 2. Low lung volumes with central bronchovascular crowding and left basilar atelectasis. Electronically Signed: Tomy Ruiz MD  7/17/2025 1:02 PM EDT  Workstation ID: [id number]        EKG  I personally viewed and interpreted the patient's EKG/Telemetry data:  Telemetry Scan   Final Result      ECG 12 Lead Other; s/p CABG   Preliminary Result   HEART RATE=66  bpm   RR Nsgspgip=397  ms   KY Interval=160  ms   P Horizontal Axis=38  deg   P Front Axis=18  deg   QRSD Interval=88  ms   QT Rkgpoqse=467  ms   SUaJ=999  ms   QRS Axis=17  deg   T Wave Axis=26  deg   - ABNORMAL ECG -   Sinus rhythm   Anterior infarct, old   Estimated MI size: 24%   Date and Time of Study:2025-07-18 03:33:32      Telemetry Scan   Final Result      Telemetry Scan   Final Result      Telemetry Scan   Final Result      ECG 12 Lead Other; s/p CABG   Final Result   HEART RATE=73  bpm   RR Jkgydklp=616  ms   KY Xyqeapql=105  ms   P Horizontal Axis=34  deg   P Front Axis=28  deg   QRSD Interval=92  ms   QT Xnfgdkzj=790  ms   HMxJ=990  ms   QRS Axis=8  deg   T Wave Axis=-16  deg   - ABNORMAL ECG -   Sinus rhythm   Anterior  infarct, old   Estimated MI size: 24%   When compared with ECG  of 15-Jul-2025 09:10:13,   No significant change   Electronically Signed By: Alexis Mcknight (MIKE) 2025-07-17 16:53:51   Date and Time of Study:2025-07-17 12:46:16      Telemetry Scan   Final Result      Telemetry Scan   Final Result            Echocardiogram:    Results for orders placed in visit on 07/17/25    Intra-Op Anesthesia KIARA     Narrative  Intra-Op Anesthesia KIARA    Procedure Performed: Intra-Op Anesthesia KIARA  Start Time:  7/17/2025 7:25 AM  End Time:   7/17/2025 7:30 AM    Preanesthesia Checklist:  Patient identified, IV assessed, risks and benefits discussed, monitors and equipment assessed, procedure being performed at surgeon's request and anesthesia consent obtained.    General Procedure Information  Diagnostic Indications for Echo:  assessment of ascending aorta, assessment of surgical repair, defect repair evaluation and hemodynamic monitoring  Location performed:  OR  Intubated  Bite block placed  Heart visualized  Probe Insertion:  Easy  Probe Type:  Multiplane  Modalities:  Color flow mapping, continuous wave Doppler and pulse wave Doppler    Echocardiographic and Doppler Measurements    Ventricles    Right Ventricle:  Cavity size normal.  Hypertrophy not present.  Thrombus not present.  Global function normal.  Left Ventricle:  Cavity size normal.  Thrombus not present.  Global Function mildly impaired.  Ejection Fraction 45%.        Valves    Aortic Valve:  Annulus normal.  Stenosis not present.  Regurgitation absent.  Leaflets normal.  Leaflet motions normal.    Mitral Valve:  Annulus normal.  Stenosis not present.  Regurgitation trace.  Leaflets normal.  Leaflet motions normal.    Tricuspid Valve:  Annulus normal.  Stenosis not present.  Regurgitation trace.  Leaflets normal.  Leaflet motions normal.  Pulmonic Valve:  Annulus normal.      Aorta    Ascending Aorta:  Size normal.  Dissection not present.  Plaque thickness less than 3 mm.  Mobile plaque not present.  Aortic Arch:  Size normal.   Dissection not present.  Plaque thickness less than 3 mm.  Mobile plaque not present.  Descending Aorta:  Size normal.  Dissection not present.  Plaque thickness less than 3 mm.  Mobile plaque not present.      Atria    Right Atrium:  Size normal.  Spontaneous echo contrast not present.  Thrombus not present.  Tumor not present.  Device not present.    Left Atrium:  Size normal.  Spontaneous echo contrast not present.  Thrombus not present.  Tumor not present.  Device not present.  Left atrial appendage normal.      Septa        Ventricular Septum:  Intra-ventricular septum morphology normal.        Other Findings  Pericardium:  normal  Pleural Effusion:  none  Pulmonary Arteries:  normal  Pulmonary Venous Flow:  normal    Anesthesia Information  Performed Personally  Anesthesiologist:  Nikolas Honeycutt MD        Stress Test:         Cardiac Catheterization:  Results for orders placed during the hospital encounter of 07/03/25    Cardiac Catheterization/Vascular Study    Conclusion  :  Leland. Leonard Richardson M.D., Attending Cardiologist      PROCEDURE PERFORMED.  Ultrasound guided vascular access  Left heart catheterization  Coronary Angiogram  Moderate Sedation    INDICATIONS FOR PROCEDURE.  39 years old man with known coronary disease and previous PCI presents with signs and symptoms consistent with unstable angina.  After discussing the risk and benefits of the procedure he was offered coronary angiography.      PROCEDURE IN DETAIL.  Informed consent was obtained from the patient after explaining the risks, benefits, and alternative options of the procedure. After obtaining informed consent, the patient was brought to the cath lab and was prepped in a sterile fashion. Lidocaine 1% was used for local anesthesia into the right radial access site. The right radial artery was accessed under direct ultrasound visualization  with an angiocath needle via modified Seldinger technique. A 6F slender sheath was inserted  successfully. Afterwards, 6F JR4  was advanced over a wire into the ascending aorta and used to cross the AV and obtain LV pressures.  AV gradient obtained via pullback technique. JR4 and JL3.5 diagnostic catheters were used to engage the ostia of the RCA and LM respectively. Images of the right and left coronary systems were obtained. All the catheters were exchanged over a wire and subsequently removed. The patient tolerated the procedure well without any complications. The pictures were reviewed at the end of the procedure. TR band applied to right wrist for hemostasis and inflated with 15 cc of air. No complications were encountered.    HEMODYNAMICS.  LV: 122/6, 70 mmHg  AO: 131/87, 105 mmHg  No significant gradient across the aortic valve during pullback of JR4 catheter.  LV gram was not performed due to recent echocardiogram.    FINDINGS.    Coronary Angiogram.    1. Left main. Left main is a large-caliber vessel which gives rise to the Left Anterior Descending and the Left circumflex.  Left main is angiographically free from any significant disease    2. Left Anterior Descending Artery. LAD is a large vessel which gives rise to several septal perforators and several diagonal branches.  Proximal LAD has 60% stenosis at the origin of first diagonal branch.  Mid LAD has a stent with 70 to 80% in-stent restenosis.  There is a jailed diagonal 2 with ostial 80-90 % stenosis.    3. Left Circumflex. The LCx is a medium caliber which gives rise to marginals.  Left circumflex artery has diffuse nonobstructive 10 to 20% disease.    4. Right Coronary Artery. The RCA is a dominant vessel which gives rise to several small caliber branches including PDA and PLV.  Proximal RCA has 20%, mid RCA has 30 to 40% and distal RCA has 60% stenosis in the PLV.    IMPRESSIONS.  1.  Multivessel coronary artery disease as described above including significant ISR of mid LAD stent involving diagonals  2.  Mildly elevated  LVEDP    RECOMMENDATIONS.  1.  Cardiac surgery referral.  2.  Smoking cessation.  3.  Continue risk factors modification    Electronically signed by Leonard Richardson MD, 07/03/25, 11:49 AM EDT.         Other:         ASSESSMENT & PLAN:    Principal Problem:    Coronary heart disease  Active Problems:    CAD (coronary artery disease)      Coronary artery disease  Premature coronary artery disease  History of STEMI and PCI with a 2.5 x 20 mm drug-eluting stent in 2017  Status post CABG x 3 with LIMA to LAD, vein graft to diagonal, vein graft to PLV/RCA on 7/17/2025.  Chest tube, pacer, Pearl management per cardiac surgery  He was extubated and now on room air.   Continue aspirin and high intensity statin  Restart beta blocker and wean off Cardene drip  Dr. Richardson would like him to be on Plavix long-term. Start once okay with CTS.   Cardiac rehab referral at the time of discharge     Primary Hypertension, chronic  He is currently on a Cardene drip.  Restart Coreg and wean off Cardene.  Restart losartan once BP/renal function allows     Heart failure with midrange ejection fraction  Echocardiogram shows EF of 45%, LVIDD 5.9 cm.  RVSP 42 mmHg.  Plan to resume losartan and Coreg  Can use Lasix as needed     Hyperlipidemia  Continue high intensity statin  LDL 78, HDL 29, triglycerides 126 and total cholesterol 130  Goal LDL less than 55  A1c is 5     Palpitations  Occasional and associated with anxiety  Will resume Coreg     Tobacco abuse  Smoking cessation counseling provided to the patient  He has cut down but continues to smoke     Obesity  BMI is 41.8.  He weighs 286 pounds.  Lifestyle modifications recommended to the patient.  Obesity complicates all aspects of his care      Electronically signed by ISIDRO Ulloa, 07/18/25, 10:50 AM EDT.

## 2025-07-19 ENCOUNTER — APPOINTMENT (OUTPATIENT)
Dept: GENERAL RADIOLOGY | Facility: HOSPITAL | Age: 40
DRG: 236 | End: 2025-07-19
Payer: COMMERCIAL

## 2025-07-19 LAB
ANION GAP SERPL CALCULATED.3IONS-SCNC: 11.1 MMOL/L (ref 5–15)
BUN SERPL-MCNC: 17.4 MG/DL (ref 6–20)
BUN/CREAT SERPL: 19.3 (ref 7–25)
CALCIUM SPEC-SCNC: 8.8 MG/DL (ref 8.6–10.5)
CHLORIDE SERPL-SCNC: 103 MMOL/L (ref 98–107)
CO2 SERPL-SCNC: 24.9 MMOL/L (ref 22–29)
CREAT SERPL-MCNC: 0.9 MG/DL (ref 0.76–1.27)
DEPRECATED RDW RBC AUTO: 42.4 FL (ref 37–54)
EGFRCR SERPLBLD CKD-EPI 2021: 110.7 ML/MIN/1.73
ERYTHROCYTE [DISTWIDTH] IN BLOOD BY AUTOMATED COUNT: 13 % (ref 12.3–15.4)
GLUCOSE BLDC GLUCOMTR-MCNC: 101 MG/DL (ref 70–105)
GLUCOSE BLDC GLUCOMTR-MCNC: 104 MG/DL (ref 70–105)
GLUCOSE BLDC GLUCOMTR-MCNC: 106 MG/DL (ref 70–105)
GLUCOSE BLDC GLUCOMTR-MCNC: 110 MG/DL (ref 70–105)
GLUCOSE BLDC GLUCOMTR-MCNC: 111 MG/DL (ref 70–105)
GLUCOSE BLDC GLUCOMTR-MCNC: 115 MG/DL (ref 70–105)
GLUCOSE BLDC GLUCOMTR-MCNC: 120 MG/DL (ref 70–105)
GLUCOSE BLDC GLUCOMTR-MCNC: 122 MG/DL (ref 70–105)
GLUCOSE SERPL-MCNC: 103 MG/DL (ref 65–99)
HCT VFR BLD AUTO: 35.8 % (ref 37.5–51)
HGB BLD-MCNC: 11.7 G/DL (ref 13–17.7)
MCH RBC QN AUTO: 29.2 PG (ref 26.6–33)
MCHC RBC AUTO-ENTMCNC: 32.7 G/DL (ref 31.5–35.7)
MCV RBC AUTO: 89.3 FL (ref 79–97)
PLATELET # BLD AUTO: 143 10*3/MM3 (ref 140–450)
PMV BLD AUTO: 10.1 FL (ref 6–12)
POTASSIUM SERPL-SCNC: 4.1 MMOL/L (ref 3.5–5.2)
QT INTERVAL: 368 MS
QT INTERVAL: 378 MS
QT INTERVAL: 403 MS
QT INTERVAL: 411 MS
QTC INTERVAL: 432 MS
QTC INTERVAL: 436 MS
QTC INTERVAL: 451 MS
QTC INTERVAL: 469 MS
RBC # BLD AUTO: 4.01 10*6/MM3 (ref 4.14–5.8)
SODIUM SERPL-SCNC: 139 MMOL/L (ref 136–145)
WBC NRBC COR # BLD AUTO: 17.04 10*3/MM3 (ref 3.4–10.8)

## 2025-07-19 PROCEDURE — 25010000002 CEFAZOLIN PER 500 MG: Performed by: NURSE PRACTITIONER

## 2025-07-19 PROCEDURE — 93005 ELECTROCARDIOGRAM TRACING: CPT | Performed by: NURSE PRACTITIONER

## 2025-07-19 PROCEDURE — 82948 REAGENT STRIP/BLOOD GLUCOSE: CPT

## 2025-07-19 PROCEDURE — 25010000002 FUROSEMIDE PER 20 MG: Performed by: THORACIC SURGERY (CARDIOTHORACIC VASCULAR SURGERY)

## 2025-07-19 PROCEDURE — 85027 COMPLETE CBC AUTOMATED: CPT | Performed by: NURSE PRACTITIONER

## 2025-07-19 PROCEDURE — 94799 UNLISTED PULMONARY SVC/PX: CPT

## 2025-07-19 PROCEDURE — 25010000002 ENOXAPARIN PER 10 MG: Performed by: NURSE PRACTITIONER

## 2025-07-19 PROCEDURE — 93010 ELECTROCARDIOGRAM REPORT: CPT | Performed by: INTERNAL MEDICINE

## 2025-07-19 PROCEDURE — 25010000002 MORPHINE PER 10 MG: Performed by: NURSE PRACTITIONER

## 2025-07-19 PROCEDURE — 80048 BASIC METABOLIC PNL TOTAL CA: CPT

## 2025-07-19 PROCEDURE — 94660 CPAP INITIATION&MGMT: CPT

## 2025-07-19 PROCEDURE — 99232 SBSQ HOSP IP/OBS MODERATE 35: CPT | Performed by: INTERNAL MEDICINE

## 2025-07-19 PROCEDURE — 71045 X-RAY EXAM CHEST 1 VIEW: CPT

## 2025-07-19 RX ORDER — TERAZOSIN 1 MG/1
2 CAPSULE ORAL NIGHTLY
Status: DISCONTINUED | OUTPATIENT
Start: 2025-07-19 | End: 2025-07-21 | Stop reason: HOSPADM

## 2025-07-19 RX ORDER — NICOTINE POLACRILEX 4 MG
15 LOZENGE BUCCAL
Status: DISCONTINUED | OUTPATIENT
Start: 2025-07-19 | End: 2025-07-21 | Stop reason: HOSPADM

## 2025-07-19 RX ORDER — POTASSIUM CHLORIDE 1500 MG/1
20 TABLET, EXTENDED RELEASE ORAL DAILY
Status: DISCONTINUED | OUTPATIENT
Start: 2025-07-19 | End: 2025-07-21 | Stop reason: HOSPADM

## 2025-07-19 RX ORDER — FUROSEMIDE 40 MG/1
40 TABLET ORAL DAILY
Status: DISCONTINUED | OUTPATIENT
Start: 2025-07-19 | End: 2025-07-21 | Stop reason: HOSPADM

## 2025-07-19 RX ORDER — INSULIN LISPRO 100 [IU]/ML
2-9 INJECTION, SOLUTION INTRAVENOUS; SUBCUTANEOUS
Status: DISCONTINUED | OUTPATIENT
Start: 2025-07-19 | End: 2025-07-21 | Stop reason: HOSPADM

## 2025-07-19 RX ORDER — DEXTROSE MONOHYDRATE 25 G/50ML
25 INJECTION, SOLUTION INTRAVENOUS
Status: DISCONTINUED | OUTPATIENT
Start: 2025-07-19 | End: 2025-07-21 | Stop reason: HOSPADM

## 2025-07-19 RX ORDER — IBUPROFEN 600 MG/1
1 TABLET ORAL
Status: DISCONTINUED | OUTPATIENT
Start: 2025-07-19 | End: 2025-07-21 | Stop reason: HOSPADM

## 2025-07-19 RX ORDER — FUROSEMIDE 10 MG/ML
40 INJECTION INTRAMUSCULAR; INTRAVENOUS ONCE
Status: COMPLETED | OUTPATIENT
Start: 2025-07-19 | End: 2025-07-19

## 2025-07-19 RX ADMIN — PANTOPRAZOLE SODIUM 40 MG: 40 TABLET, DELAYED RELEASE ORAL at 05:32

## 2025-07-19 RX ADMIN — ASPIRIN 81 MG: 81 TABLET, COATED ORAL at 08:34

## 2025-07-19 RX ADMIN — HYDROCODONE BITARTRATE AND ACETAMINOPHEN 1 TABLET: 5; 325 TABLET ORAL at 05:32

## 2025-07-19 RX ADMIN — FUROSEMIDE 40 MG: 10 INJECTION, SOLUTION INTRAMUSCULAR; INTRAVENOUS at 01:20

## 2025-07-19 RX ADMIN — CEFAZOLIN 2 G: 2 INJECTION, POWDER, FOR SOLUTION INTRAMUSCULAR; INTRAVENOUS at 03:29

## 2025-07-19 RX ADMIN — TERAZOSIN HYDROCHLORIDE 2 MG: 1 CAPSULE ORAL at 20:39

## 2025-07-19 RX ADMIN — POLYETHYLENE GLYCOL 3350 17 G: 17 POWDER, FOR SOLUTION ORAL at 08:33

## 2025-07-19 RX ADMIN — CARVEDILOL 3.12 MG: 3.12 TABLET, FILM COATED ORAL at 17:24

## 2025-07-19 RX ADMIN — HYDROCODONE BITARTRATE AND ACETAMINOPHEN 1 TABLET: 5; 325 TABLET ORAL at 17:24

## 2025-07-19 RX ADMIN — GABAPENTIN 400 MG: 400 CAPSULE ORAL at 15:46

## 2025-07-19 RX ADMIN — SENNOSIDES AND DOCUSATE SODIUM 2 TABLET: 50; 8.6 TABLET ORAL at 08:33

## 2025-07-19 RX ADMIN — SENNOSIDES AND DOCUSATE SODIUM 2 TABLET: 50; 8.6 TABLET ORAL at 20:39

## 2025-07-19 RX ADMIN — ENOXAPARIN SODIUM 40 MG: 100 INJECTION SUBCUTANEOUS at 21:17

## 2025-07-19 RX ADMIN — CYCLOBENZAPRINE HYDROCHLORIDE 10 MG: 10 TABLET, FILM COATED ORAL at 22:28

## 2025-07-19 RX ADMIN — HYDROCODONE BITARTRATE AND ACETAMINOPHEN 1 TABLET: 5; 325 TABLET ORAL at 22:28

## 2025-07-19 RX ADMIN — MORPHINE SULFATE 2 MG: 2 INJECTION, SOLUTION INTRAMUSCULAR; INTRAVENOUS at 00:15

## 2025-07-19 RX ADMIN — FUROSEMIDE 40 MG: 40 TABLET ORAL at 09:00

## 2025-07-19 RX ADMIN — POTASSIUM CHLORIDE 20 MEQ: 1500 TABLET, EXTENDED RELEASE ORAL at 09:00

## 2025-07-19 RX ADMIN — MUPIROCIN 1 APPLICATION: 20 OINTMENT TOPICAL at 08:34

## 2025-07-19 RX ADMIN — MORPHINE SULFATE 2 MG: 2 INJECTION, SOLUTION INTRAMUSCULAR; INTRAVENOUS at 03:08

## 2025-07-19 RX ADMIN — GABAPENTIN 400 MG: 400 CAPSULE ORAL at 08:34

## 2025-07-19 RX ADMIN — ATORVASTATIN CALCIUM 40 MG: 40 TABLET, FILM COATED ORAL at 20:40

## 2025-07-19 RX ADMIN — MUPIROCIN 1 APPLICATION: 20 OINTMENT TOPICAL at 20:40

## 2025-07-19 RX ADMIN — MORPHINE SULFATE 2 MG: 2 INJECTION, SOLUTION INTRAMUSCULAR; INTRAVENOUS at 06:13

## 2025-07-19 RX ADMIN — POLYETHYLENE GLYCOL 3350 17 G: 17 POWDER, FOR SOLUTION ORAL at 20:39

## 2025-07-19 RX ADMIN — CARVEDILOL 3.12 MG: 3.12 TABLET, FILM COATED ORAL at 08:33

## 2025-07-19 RX ADMIN — GABAPENTIN 400 MG: 400 CAPSULE ORAL at 20:40

## 2025-07-19 RX ADMIN — HYDROCODONE BITARTRATE AND ACETAMINOPHEN 1 TABLET: 5; 325 TABLET ORAL at 13:14

## 2025-07-19 NOTE — PROGRESS NOTES
Tried to placed pt on BIPAP for during sleep but pt refused. Pt stated it is hard to tolerate bipap. Bipap still in the room on standby at this time.

## 2025-07-19 NOTE — PROGRESS NOTES
CC--- coronary artery disease post bypass surgery    Sub  Patient comfortable sitting in the chair and denies any active symptoms      Past Medical History:   Diagnosis Date    Coronary artery disease     Hyperlipidemia     Hypertension     Myocardial infarction 09/15/2017     Past Surgical History:   Procedure Laterality Date    CARDIAC CATHETERIZATION Right 7/3/2025    Procedure: Left Heart Cath with Coronary Angiography;  Surgeon: Leonard Richardson MD;  Location: [facility] CATH INVASIVE LOCATION;  Service: Cardiovascular;  Laterality: Right;    CORONARY STENT PLACEMENT  09/2017    NO PAST SURGERIES       Family History   Problem Relation Age of Onset    Heart disease Mother     Heart disease Father     Heart failure Father     No Known Problems Sister     Heart disease Brother     Heart failure Brother     Heart attack Brother     No Known Problems Maternal Aunt     No Known Problems Maternal Uncle     No Known Problems Paternal Aunt     No Known Problems Paternal Uncle     No Known Problems Maternal Grandmother     Heart disease Maternal Grandfather     Heart failure Maternal Grandfather     No Known Problems Paternal Grandmother     No Known Problems Paternal Grandfather     No Known Problems Other     Anemia Neg Hx     Arrhythmia Neg Hx     Asthma Neg Hx     Clotting disorder Neg Hx     Fainting Neg Hx     Hyperlipidemia Neg Hx     Hypertension Neg Hx      Social History     Tobacco Use    Smoking status: Every Day     Current packs/day: 0.50     Average packs/day: 0.5 packs/day for 15.0 years (7.5 ttl pk-yrs)     Types: Cigarettes     Passive exposure: Current    Smokeless tobacco: Never   Vaping Use    Vaping status: Never Used   Substance Use Topics    Alcohol use: No    Drug use: Yes     Types: Marijuana     Medications Prior to Admission   Medication Sig Dispense Refill Last Dose/Taking    atorvastatin (LIPITOR) 40 MG tablet Take 1 tablet by mouth Every Night.       carvedilol (COREG) 3.125 MG tablet Take 1  tablet by mouth Daily.       clopidogrel (PLAVIX) 75 MG tablet Take 1 tablet by mouth Daily. 30 days only needs an appointment for more refills. 30 tablet 1     doxazosin (CARDURA) 2 MG tablet Take 1 tablet by mouth every night at bedtime.       gabapentin (NEURONTIN) 400 MG capsule Take 1 capsule by mouth 3 times a day.       LORazepam (ATIVAN) 0.5 MG tablet Take 1 tablet by mouth 2 (Two) Times a Day.  0     losartan (COZAAR) 100 MG tablet Take 1 tablet by mouth Daily.  0     mupirocin (BACTROBAN) 2 % ointment Apply to nares (inside each nostril) twice daily as directed for procedure 22 g 0     nitroglycerin (NITROSTAT) 0.4 MG SL tablet 1 under the tongue as needed for angina, may repeat q5mins for up three doses 15 tablet 2     pantoprazole (PROTONIX) 40 MG EC tablet Take 1 tablet by mouth Daily.       traMADol (ULTRAM) 50 MG tablet Take 2 tablets by mouth 2 (Two) Times a Day.  0      Allergies:  Atenolol, Amlodipine, and Metoprolol        Physical Exam    General:      well developed, well nourished, in no acute distress.    Head:      normocephalic and atraumatic.    Eyes:      PERRL/EOM intact, conjunctivae and sclerae clear without nystagmus.    Neck:      no  thyromegaly, trachea central with normal respiratory effort  Lungs:      clear bilaterally to auscultation.    Heart:       regular rate and rhythm, S1, S2 without murmurs, rubs, or gallops  Skin:      intact without lesions or rashes.    Psych:      alert and cooperative; normal mood and affect; normal attention span and concentration.                CBC    Results from last 7 days   Lab Units 07/19/25  0333 07/18/25  0300 07/17/25  1808 07/17/25  1230 07/17/25  1034 07/17/25  1022 07/17/25  1002 07/17/25  0730 07/15/25  0823   WBC 10*3/mm3 17.04* 15.07* 14.56* 15.14*  --   --   --   --  9.76   HEMOGLOBIN g/dL 11.7* 12.4* 11.7* 12.7*  --   --   --   --  14.9   HEMOGLOBIN, POC g/dL  --   --   --   --  11.2* 11.2* 11.9*   < >  --    PLATELETS 10*3/mm3 143  161 167 150  --   --   --   --  249    < > = values in this interval not displayed.     BMP   Results from last 7 days   Lab Units 07/19/25  0009 07/18/25  0300 07/18/25  0057 07/17/25  1230 07/15/25  0823   SODIUM mmol/L 139 140  --  142 139   POTASSIUM mmol/L 4.1 4.3  --  4.8 4.2   CHLORIDE mmol/L 103 107  --  106 104   CO2 mmol/L 24.9 21.7*  --  24.5 26.9   BUN mg/dL 17.4 17.0  --  12.2 14.2   CREATININE mg/dL 0.90 1.04 0.92 1.10 0.98   GLUCOSE mg/dL 103* 170*  --  104* 111*   MAGNESIUM mg/dL  --  2.7*  --   --   --    PHOSPHORUS mg/dL  --  2.6  --  3.4  --      Radiology(recent) XR Chest 1 View  Result Date: 7/19/2025  Improved appearance of the left upper lobe, otherwise stable. Electronically Signed: Mahamed Trinidad MD  7/19/2025 2:23 AM EDT  Workstation ID: [id number]    XR Chest 1 View  Result Date: 7/18/2025  Impression: 1.No definite pneumothorax. 2.Atelectasis within the lungs. There is new density left upper lobe that could be reflective of some atelectasis within this area. Attention to this area on follow-up suggested. 3.Gaseous distention of the stomach. Electronically Signed: Eric Higgins MD  7/18/2025 5:54 PM EDT  Workstation ID: [id number]    XR Chest 1 View  Result Date: 7/18/2025  Impression: Left basilar atelectasis. No evidence of pulmonary edema or pneumothorax Electronically Signed: Augusto Jeff  7/18/2025 8:32 AM EDT  Workstation ID: [id number]     Assessment and plan    Progressive coronary artery disease post bypass surgery  Clinically doing well in sinus rhythm and hemodynamically stable  Continue current management as per CT surgery  Smoking cessation reinforced  Hyperlipidemia on statin  Hypertension controlled      Electronically signed by Jerry Verdugo MD, 07/19/25, 3:22 PM EDT.

## 2025-07-19 NOTE — CASE MANAGEMENT/SOCIAL WORK
Continued Stay Note  [facility]     Patient Name: Clark Randolph  MRN: [id number]  Today's Date: 7/19/2025    Admit Date: 7/17/2025    Plan: Plan to return home with spouse Milana. CABG 7/17/25.   Discharge Plan       Row Name 07/19/25 1742       Plan    Plan Plan to return home with spouse Milana. CABG 7/17/25.    Patient/Family in Agreement with Plan yes    Plan Comments PT recommending home with assist. Plan to return home with spouse.             Kiara Barton RN BSN  Weekend   [facility]  Phone: [phone]  Fax: [phone]

## 2025-07-19 NOTE — PROGRESS NOTES
He is doing well.  He is having some urinary retention.  Restart Cardura.  He feels like he has to go but trying to go sitting in the chair.  Hopefully once he gets up he can go.

## 2025-07-20 LAB
ANION GAP SERPL CALCULATED.3IONS-SCNC: 13.1 MMOL/L (ref 5–15)
BUN SERPL-MCNC: 18.2 MG/DL (ref 6–20)
BUN/CREAT SERPL: 25.3 (ref 7–25)
CALCIUM SPEC-SCNC: 8.9 MG/DL (ref 8.6–10.5)
CHLORIDE SERPL-SCNC: 100 MMOL/L (ref 98–107)
CO2 SERPL-SCNC: 24.9 MMOL/L (ref 22–29)
CREAT SERPL-MCNC: 0.72 MG/DL (ref 0.76–1.27)
DEPRECATED RDW RBC AUTO: 41.4 FL (ref 37–54)
EGFRCR SERPLBLD CKD-EPI 2021: 118.4 ML/MIN/1.73
ERYTHROCYTE [DISTWIDTH] IN BLOOD BY AUTOMATED COUNT: 12.6 % (ref 12.3–15.4)
GLUCOSE BLDC GLUCOMTR-MCNC: 124 MG/DL (ref 70–105)
GLUCOSE BLDC GLUCOMTR-MCNC: 79 MG/DL (ref 70–105)
GLUCOSE BLDC GLUCOMTR-MCNC: 83 MG/DL (ref 70–105)
GLUCOSE BLDC GLUCOMTR-MCNC: 85 MG/DL (ref 70–105)
GLUCOSE SERPL-MCNC: 100 MG/DL (ref 65–99)
HCT VFR BLD AUTO: 35.8 % (ref 37.5–51)
HGB BLD-MCNC: 11.9 G/DL (ref 13–17.7)
MCH RBC QN AUTO: 29.5 PG (ref 26.6–33)
MCHC RBC AUTO-ENTMCNC: 33.2 G/DL (ref 31.5–35.7)
MCV RBC AUTO: 88.6 FL (ref 79–97)
PLATELET # BLD AUTO: 151 10*3/MM3 (ref 140–450)
PMV BLD AUTO: 10.2 FL (ref 6–12)
POTASSIUM SERPL-SCNC: 3.6 MMOL/L (ref 3.5–5.2)
POTASSIUM SERPL-SCNC: 3.6 MMOL/L (ref 3.5–5.2)
RBC # BLD AUTO: 4.04 10*6/MM3 (ref 4.14–5.8)
SODIUM SERPL-SCNC: 138 MMOL/L (ref 136–145)
WBC NRBC COR # BLD AUTO: 11.71 10*3/MM3 (ref 3.4–10.8)

## 2025-07-20 PROCEDURE — 94799 UNLISTED PULMONARY SVC/PX: CPT

## 2025-07-20 PROCEDURE — 99232 SBSQ HOSP IP/OBS MODERATE 35: CPT | Performed by: INTERNAL MEDICINE

## 2025-07-20 PROCEDURE — 84132 ASSAY OF SERUM POTASSIUM: CPT

## 2025-07-20 PROCEDURE — 25010000002 BUMETANIDE PER 0.5 MG: Performed by: THORACIC SURGERY (CARDIOTHORACIC VASCULAR SURGERY)

## 2025-07-20 PROCEDURE — 94762 N-INVAS EAR/PLS OXIMTRY CONT: CPT

## 2025-07-20 PROCEDURE — 94618 PULMONARY STRESS TESTING: CPT

## 2025-07-20 PROCEDURE — 80048 BASIC METABOLIC PNL TOTAL CA: CPT | Performed by: NURSE PRACTITIONER

## 2025-07-20 PROCEDURE — 85027 COMPLETE CBC AUTOMATED: CPT | Performed by: NURSE PRACTITIONER

## 2025-07-20 PROCEDURE — 82948 REAGENT STRIP/BLOOD GLUCOSE: CPT | Performed by: THORACIC SURGERY (CARDIOTHORACIC VASCULAR SURGERY)

## 2025-07-20 PROCEDURE — 25010000002 ENOXAPARIN PER 10 MG: Performed by: NURSE PRACTITIONER

## 2025-07-20 PROCEDURE — 82948 REAGENT STRIP/BLOOD GLUCOSE: CPT

## 2025-07-20 RX ORDER — POTASSIUM CHLORIDE 1.5 G/1.58G
40 POWDER, FOR SOLUTION ORAL EVERY 4 HOURS
Status: DISCONTINUED | OUTPATIENT
Start: 2025-07-20 | End: 2025-07-20

## 2025-07-20 RX ORDER — BUMETANIDE 0.25 MG/ML
2 INJECTION, SOLUTION INTRAMUSCULAR; INTRAVENOUS ONCE
Status: COMPLETED | OUTPATIENT
Start: 2025-07-20 | End: 2025-07-20

## 2025-07-20 RX ORDER — POTASSIUM CHLORIDE 1500 MG/1
20 TABLET, EXTENDED RELEASE ORAL ONCE
Status: COMPLETED | OUTPATIENT
Start: 2025-07-20 | End: 2025-07-20

## 2025-07-20 RX ORDER — POTASSIUM CHLORIDE 1500 MG/1
40 TABLET, EXTENDED RELEASE ORAL EVERY 4 HOURS
Status: COMPLETED | OUTPATIENT
Start: 2025-07-20 | End: 2025-07-20

## 2025-07-20 RX ORDER — DIPHENHYDRAMINE HYDROCHLORIDE 50 MG/ML
25 INJECTION, SOLUTION INTRAMUSCULAR; INTRAVENOUS EVERY 6 HOURS PRN
Status: DISCONTINUED | OUTPATIENT
Start: 2025-07-20 | End: 2025-07-21 | Stop reason: HOSPADM

## 2025-07-20 RX ADMIN — POTASSIUM CHLORIDE 40 MEQ: 1500 TABLET, EXTENDED RELEASE ORAL at 09:13

## 2025-07-20 RX ADMIN — CARVEDILOL 3.12 MG: 3.12 TABLET, FILM COATED ORAL at 09:12

## 2025-07-20 RX ADMIN — SENNOSIDES AND DOCUSATE SODIUM 2 TABLET: 50; 8.6 TABLET ORAL at 20:51

## 2025-07-20 RX ADMIN — CYCLOBENZAPRINE HYDROCHLORIDE 10 MG: 10 TABLET, FILM COATED ORAL at 09:10

## 2025-07-20 RX ADMIN — HYDROCODONE BITARTRATE AND ACETAMINOPHEN 1 TABLET: 5; 325 TABLET ORAL at 09:11

## 2025-07-20 RX ADMIN — SENNOSIDES AND DOCUSATE SODIUM 2 TABLET: 50; 8.6 TABLET ORAL at 09:12

## 2025-07-20 RX ADMIN — ASPIRIN 81 MG: 81 TABLET, COATED ORAL at 09:12

## 2025-07-20 RX ADMIN — PANTOPRAZOLE SODIUM 40 MG: 40 TABLET, DELAYED RELEASE ORAL at 04:00

## 2025-07-20 RX ADMIN — CYCLOBENZAPRINE HYDROCHLORIDE 10 MG: 10 TABLET, FILM COATED ORAL at 20:50

## 2025-07-20 RX ADMIN — HYDROCODONE BITARTRATE AND ACETAMINOPHEN 1 TABLET: 5; 325 TABLET ORAL at 14:13

## 2025-07-20 RX ADMIN — HYDROCODONE BITARTRATE AND ACETAMINOPHEN 1 TABLET: 5; 325 TABLET ORAL at 20:50

## 2025-07-20 RX ADMIN — TERAZOSIN HYDROCHLORIDE 2 MG: 1 CAPSULE ORAL at 20:52

## 2025-07-20 RX ADMIN — MUPIROCIN 1 APPLICATION: 20 OINTMENT TOPICAL at 20:51

## 2025-07-20 RX ADMIN — POTASSIUM CHLORIDE 40 MEQ: 1500 TABLET, EXTENDED RELEASE ORAL at 05:21

## 2025-07-20 RX ADMIN — POTASSIUM CHLORIDE 40 MEQ: 1.5 POWDER, FOR SOLUTION ORAL at 18:40

## 2025-07-20 RX ADMIN — HYDROCODONE BITARTRATE AND ACETAMINOPHEN 1 TABLET: 5; 325 TABLET ORAL at 03:25

## 2025-07-20 RX ADMIN — BUMETANIDE 2 MG: 0.25 INJECTION INTRAMUSCULAR; INTRAVENOUS at 14:13

## 2025-07-20 RX ADMIN — POTASSIUM CHLORIDE 20 MEQ: 1500 TABLET, EXTENDED RELEASE ORAL at 20:50

## 2025-07-20 RX ADMIN — CARVEDILOL 3.12 MG: 3.12 TABLET, FILM COATED ORAL at 18:40

## 2025-07-20 RX ADMIN — ENOXAPARIN SODIUM 40 MG: 100 INJECTION SUBCUTANEOUS at 20:49

## 2025-07-20 RX ADMIN — GABAPENTIN 400 MG: 400 CAPSULE ORAL at 20:51

## 2025-07-20 RX ADMIN — GABAPENTIN 400 MG: 400 CAPSULE ORAL at 14:14

## 2025-07-20 RX ADMIN — ENOXAPARIN SODIUM 40 MG: 100 INJECTION SUBCUTANEOUS at 09:11

## 2025-07-20 RX ADMIN — ATORVASTATIN CALCIUM 40 MG: 40 TABLET, FILM COATED ORAL at 20:51

## 2025-07-20 RX ADMIN — POLYETHYLENE GLYCOL 3350 17 G: 17 POWDER, FOR SOLUTION ORAL at 09:12

## 2025-07-20 RX ADMIN — FUROSEMIDE 40 MG: 40 TABLET ORAL at 09:13

## 2025-07-20 RX ADMIN — GABAPENTIN 400 MG: 400 CAPSULE ORAL at 09:13

## 2025-07-20 NOTE — PROGRESS NOTES
Doing well.  He has voided.  Still needs a little extra diuresis.  Check oximetry.  May be home tomorrow.

## 2025-07-20 NOTE — PROGRESS NOTES
CC--- coronary artery disease post bypass surgery    Sub  Patient comfortable sitting in the chair and complains of incisional pain     Past Medical History:   Diagnosis Date    Coronary artery disease     Hyperlipidemia     Hypertension     Myocardial infarction 09/15/2017     Past Surgical History:   Procedure Laterality Date    CARDIAC CATHETERIZATION Right 7/3/2025    Procedure: Left Heart Cath with Coronary Angiography;  Surgeon: Leonard Richardson MD;  Location: [facility] CATH INVASIVE LOCATION;  Service: Cardiovascular;  Laterality: Right;    CORONARY STENT PLACEMENT  09/2017    NO PAST SURGERIES       Family History   Problem Relation Age of Onset    Heart disease Mother     Heart disease Father     Heart failure Father     No Known Problems Sister     Heart disease Brother     Heart failure Brother     Heart attack Brother     No Known Problems Maternal Aunt     No Known Problems Maternal Uncle     No Known Problems Paternal Aunt     No Known Problems Paternal Uncle     No Known Problems Maternal Grandmother     Heart disease Maternal Grandfather     Heart failure Maternal Grandfather     No Known Problems Paternal Grandmother     No Known Problems Paternal Grandfather     No Known Problems Other     Anemia Neg Hx     Arrhythmia Neg Hx     Asthma Neg Hx     Clotting disorder Neg Hx     Fainting Neg Hx     Hyperlipidemia Neg Hx     Hypertension Neg Hx      Social History     Tobacco Use    Smoking status: Every Day     Current packs/day: 0.50     Average packs/day: 0.5 packs/day for 15.0 years (7.5 ttl pk-yrs)     Types: Cigarettes     Passive exposure: Current    Smokeless tobacco: Never   Vaping Use    Vaping status: Never Used   Substance Use Topics    Alcohol use: No    Drug use: Yes     Types: Marijuana     Medications Prior to Admission   Medication Sig Dispense Refill Last Dose/Taking    atorvastatin (LIPITOR) 40 MG tablet Take 1 tablet by mouth Every Night.       carvedilol (COREG) 3.125 MG tablet Take 1  tablet by mouth Daily.       clopidogrel (PLAVIX) 75 MG tablet Take 1 tablet by mouth Daily. 30 days only needs an appointment for more refills. 30 tablet 1     doxazosin (CARDURA) 2 MG tablet Take 1 tablet by mouth every night at bedtime.       gabapentin (NEURONTIN) 400 MG capsule Take 1 capsule by mouth 3 times a day.       LORazepam (ATIVAN) 0.5 MG tablet Take 1 tablet by mouth 2 (Two) Times a Day.  0     losartan (COZAAR) 100 MG tablet Take 1 tablet by mouth Daily.  0     mupirocin (BACTROBAN) 2 % ointment Apply to nares (inside each nostril) twice daily as directed for procedure 22 g 0     nitroglycerin (NITROSTAT) 0.4 MG SL tablet 1 under the tongue as needed for angina, may repeat q5mins for up three doses 15 tablet 2     pantoprazole (PROTONIX) 40 MG EC tablet Take 1 tablet by mouth Daily.       traMADol (ULTRAM) 50 MG tablet Take 2 tablets by mouth 2 (Two) Times a Day.  0      Allergies:  Atenolol, Amlodipine, and Metoprolol        Physical Exam    General:      well developed, well nourished, in no acute distress.    Head:      normocephalic and atraumatic.    Eyes:      PERRL/EOM intact, conjunctivae and sclerae clear without nystagmus.    Neck:      no  thyromegaly, trachea central with normal respiratory effort  Lungs:      Mild wheezing noted reduced breath sounds in bases     Heart:       regular rate and rhythm, S1, S2 without murmurs, rubs, or gallops  Skin:      intact without lesions or rashes.    Psych:      alert and cooperative; normal mood and affect; normal attention span and concentration.                CBC    Results from last 7 days   Lab Units 07/20/25  0357 07/19/25  0333 07/18/25  0300 07/17/25  1808 07/17/25  1230 07/17/25  1034 07/17/25  1022 07/17/25  0730 07/15/25  0823   WBC 10*3/mm3 11.71* 17.04* 15.07* 14.56* 15.14*  --   --   --  9.76   HEMOGLOBIN g/dL 11.9* 11.7* 12.4* 11.7* 12.7*  --   --   --  14.9   HEMOGLOBIN, POC g/dL  --   --   --   --   --  11.2* 11.2*   < >  --     PLATELETS 10*3/mm3 151 143 161 167 150  --   --   --  249    < > = values in this interval not displayed.     BMP   Results from last 7 days   Lab Units 07/20/25  0357 07/19/25  0009 07/18/25  0300 07/18/25  0057 07/17/25  1230 07/15/25  0823   SODIUM mmol/L 138 139 140  --  142 139   POTASSIUM mmol/L 3.6 4.1 4.3  --  4.8 4.2   CHLORIDE mmol/L 100 103 107  --  106 104   CO2 mmol/L 24.9 24.9 21.7*  --  24.5 26.9   BUN mg/dL 18.2 17.4 17.0  --  12.2 14.2   CREATININE mg/dL 0.72* 0.90 1.04 0.92 1.10 0.98   GLUCOSE mg/dL 100* 103* 170*  --  104* 111*   MAGNESIUM mg/dL  --   --  2.7*  --   --   --    PHOSPHORUS mg/dL  --   --  2.6  --  3.4  --      Radiology(recent) XR Chest 1 View  Result Date: 7/19/2025  Improved appearance of the left upper lobe, otherwise stable. Electronically Signed: Mahamed Trinidad MD  7/19/2025 2:23 AM EDT  Workstation ID: [id number]    XR Chest 1 View  Result Date: 7/18/2025  Impression: 1.No definite pneumothorax. 2.Atelectasis within the lungs. There is new density left upper lobe that could be reflective of some atelectasis within this area. Attention to this area on follow-up suggested. 3.Gaseous distention of the stomach. Electronically Signed: Eric Higgins MD  7/18/2025 5:54 PM EDT  Workstation ID: [id number]      Assessment and plan    Progressive coronary artery disease post bypass surgery  Clinically doing well in sinus rhythm and hemodynamically stable  Continue current management as per CT surgery  Hypertension controlled      Electronically signed by Jerry Verdugo MD, 07/20/25, 10:36 AM EDT.

## 2025-07-20 NOTE — PROCEDURES
Exercise Oximetry    Patient Name:Clark Randolph   MRN: [id number]   Date: 07/20/25             ROOM AIR BASELINE   SpO2% 94 on room air at rest in chair   Heart Rate    Blood Pressure      EXERCISE ON ROOM AIR SpO2% EXERCISE ON O2 @    LPM SpO2%   1 MINUTE 94 1 MINUTE    2 MINUTES 93 2 MINUTES    3 MINUTES 94 3 MINUTES    4 MINUTES 93 4 MINUTES    5 MINUTES 94 5 MINUTES    6 MINUTES 95 6 MINUTES               Distance Walked   Distance Walked   Dyspnea (Jose Scale)   Dyspnea (Jose Scale)   Fatigue (Jose Scale)   Fatigue (Jose Scale)   SpO2% Post Exercise   SpO2% Post Exercise   HR Post Exercise   HR Post Exercise   Time to Recovery   Time to Recovery     Comments: Sao2 94% on room air sitting up in chair, pt walked in hua without desaturation as noted above.

## 2025-07-21 ENCOUNTER — READMISSION MANAGEMENT (OUTPATIENT)
Dept: CALL CENTER | Facility: HOSPITAL | Age: 40
End: 2025-07-21
Payer: COMMERCIAL

## 2025-07-21 VITALS
BODY MASS INDEX: 41.92 KG/M2 | WEIGHT: 283 LBS | RESPIRATION RATE: 17 BRPM | DIASTOLIC BLOOD PRESSURE: 80 MMHG | TEMPERATURE: 97.9 F | HEIGHT: 69 IN | SYSTOLIC BLOOD PRESSURE: 120 MMHG | OXYGEN SATURATION: 96 % | HEART RATE: 92 BPM

## 2025-07-21 LAB
ANION GAP SERPL CALCULATED.3IONS-SCNC: 11.5 MMOL/L (ref 5–15)
BUN SERPL-MCNC: 17.6 MG/DL (ref 6–20)
BUN/CREAT SERPL: 22.3 (ref 7–25)
CALCIUM SPEC-SCNC: 8.8 MG/DL (ref 8.6–10.5)
CHLORIDE SERPL-SCNC: 103 MMOL/L (ref 98–107)
CO2 SERPL-SCNC: 25.5 MMOL/L (ref 22–29)
CREAT SERPL-MCNC: 0.79 MG/DL (ref 0.76–1.27)
DEPRECATED RDW RBC AUTO: 39.9 FL (ref 37–54)
EGFRCR SERPLBLD CKD-EPI 2021: 115.2 ML/MIN/1.73
ERYTHROCYTE [DISTWIDTH] IN BLOOD BY AUTOMATED COUNT: 12.5 % (ref 12.3–15.4)
GLUCOSE BLDC GLUCOMTR-MCNC: 81 MG/DL (ref 70–105)
GLUCOSE BLDC GLUCOMTR-MCNC: 87 MG/DL (ref 70–105)
GLUCOSE SERPL-MCNC: 97 MG/DL (ref 65–99)
HCT VFR BLD AUTO: 35.4 % (ref 37.5–51)
HGB BLD-MCNC: 11.8 G/DL (ref 13–17.7)
MCH RBC QN AUTO: 29.1 PG (ref 26.6–33)
MCHC RBC AUTO-ENTMCNC: 33.3 G/DL (ref 31.5–35.7)
MCV RBC AUTO: 87.2 FL (ref 79–97)
PLATELET # BLD AUTO: 194 10*3/MM3 (ref 140–450)
PMV BLD AUTO: 10 FL (ref 6–12)
POTASSIUM SERPL-SCNC: 3.8 MMOL/L (ref 3.5–5.2)
RBC # BLD AUTO: 4.06 10*6/MM3 (ref 4.14–5.8)
SODIUM SERPL-SCNC: 140 MMOL/L (ref 136–145)
WBC NRBC COR # BLD AUTO: 8.72 10*3/MM3 (ref 3.4–10.8)

## 2025-07-21 PROCEDURE — 82948 REAGENT STRIP/BLOOD GLUCOSE: CPT | Performed by: THORACIC SURGERY (CARDIOTHORACIC VASCULAR SURGERY)

## 2025-07-21 PROCEDURE — 85027 COMPLETE CBC AUTOMATED: CPT | Performed by: NURSE PRACTITIONER

## 2025-07-21 PROCEDURE — 25010000002 ENOXAPARIN PER 10 MG: Performed by: NURSE PRACTITIONER

## 2025-07-21 PROCEDURE — 80048 BASIC METABOLIC PNL TOTAL CA: CPT | Performed by: NURSE PRACTITIONER

## 2025-07-21 PROCEDURE — 97110 THERAPEUTIC EXERCISES: CPT

## 2025-07-21 PROCEDURE — 99232 SBSQ HOSP IP/OBS MODERATE 35: CPT | Performed by: INTERNAL MEDICINE

## 2025-07-21 PROCEDURE — 97116 GAIT TRAINING THERAPY: CPT

## 2025-07-21 RX ORDER — LOSARTAN POTASSIUM 25 MG/1
25 TABLET ORAL
Status: DISCONTINUED | OUTPATIENT
Start: 2025-07-21 | End: 2025-07-21 | Stop reason: HOSPADM

## 2025-07-21 RX ORDER — POTASSIUM CHLORIDE 750 MG/1
10 TABLET, EXTENDED RELEASE ORAL DAILY
Qty: 30 TABLET | Refills: 0 | Status: SHIPPED | OUTPATIENT
Start: 2025-07-22

## 2025-07-21 RX ORDER — ASPIRIN 81 MG/1
81 TABLET ORAL DAILY
Qty: 30 TABLET | Refills: 2 | Status: SHIPPED | OUTPATIENT
Start: 2025-07-22 | End: 2025-10-20

## 2025-07-21 RX ORDER — LOSARTAN POTASSIUM 25 MG/1
25 TABLET ORAL
Qty: 30 TABLET | Refills: 2 | Status: SHIPPED | OUTPATIENT
Start: 2025-07-22 | End: 2025-10-20

## 2025-07-21 RX ORDER — POTASSIUM CHLORIDE 1500 MG/1
20 TABLET, EXTENDED RELEASE ORAL ONCE
Status: COMPLETED | OUTPATIENT
Start: 2025-07-21 | End: 2025-07-21

## 2025-07-21 RX ORDER — HYDROCODONE BITARTRATE AND ACETAMINOPHEN 5; 325 MG/1; MG/1
1 TABLET ORAL EVERY 4 HOURS PRN
Qty: 30 TABLET | Refills: 0 | Status: SHIPPED | OUTPATIENT
Start: 2025-07-21 | End: 2025-07-28

## 2025-07-21 RX ORDER — FUROSEMIDE 20 MG/1
20 TABLET ORAL DAILY
Qty: 30 TABLET | Refills: 0 | Status: SHIPPED | OUTPATIENT
Start: 2025-07-22 | End: 2025-08-21

## 2025-07-21 RX ADMIN — ENOXAPARIN SODIUM 40 MG: 100 INJECTION SUBCUTANEOUS at 08:24

## 2025-07-21 RX ADMIN — CYCLOBENZAPRINE HYDROCHLORIDE 10 MG: 10 TABLET, FILM COATED ORAL at 05:23

## 2025-07-21 RX ADMIN — PANTOPRAZOLE SODIUM 40 MG: 40 TABLET, DELAYED RELEASE ORAL at 05:23

## 2025-07-21 RX ADMIN — HYDROCODONE BITARTRATE AND ACETAMINOPHEN 1 TABLET: 5; 325 TABLET ORAL at 05:22

## 2025-07-21 RX ADMIN — CARVEDILOL 3.12 MG: 3.12 TABLET, FILM COATED ORAL at 08:23

## 2025-07-21 RX ADMIN — MUPIROCIN 1 APPLICATION: 20 OINTMENT TOPICAL at 08:24

## 2025-07-21 RX ADMIN — FUROSEMIDE 40 MG: 40 TABLET ORAL at 08:24

## 2025-07-21 RX ADMIN — POTASSIUM CHLORIDE 20 MEQ: 1500 TABLET, EXTENDED RELEASE ORAL at 08:24

## 2025-07-21 RX ADMIN — LOSARTAN POTASSIUM 25 MG: 25 TABLET, FILM COATED ORAL at 08:24

## 2025-07-21 RX ADMIN — HYDROCODONE BITARTRATE AND ACETAMINOPHEN 1 TABLET: 5; 325 TABLET ORAL at 11:05

## 2025-07-21 RX ADMIN — GABAPENTIN 400 MG: 400 CAPSULE ORAL at 08:24

## 2025-07-21 RX ADMIN — POTASSIUM CHLORIDE 20 MEQ: 1500 TABLET, EXTENDED RELEASE ORAL at 05:22

## 2025-07-21 RX ADMIN — ASPIRIN 81 MG: 81 TABLET, COATED ORAL at 08:24

## 2025-07-21 NOTE — PROGRESS NOTES
CARDIOLOGY PROGRESS NOTE      Referring Provider: Berto Yung, *    Reason for follow-up:  cardiovascular management post-CABG     Patient Care Team:  Kwadwo Tovar MD as PCP - General (Family Medicine)  Leonard Richardson MD as Cardiologist (Cardiology)      SUBJECTIVE  Resting comfortably in bed.  Blood pressure and heart rate are normal and stable.  He is on 2 L of oxygen.       ROS  Review of all systems negative except as indicated.    Since I have last seen, the patient has been without any chest discomfort, shortness of breath, palpitations, dizziness or syncope.  Denies having any headache, abdominal pain, nausea, vomiting, diarrhea, constipation, loss of weight or loss of appetite.  Denies having any excessive bruising, hematuria or blood in the stool.        Personal History:    Past Medical History:   Diagnosis Date    Coronary artery disease     Hyperlipidemia     Hypertension     Myocardial infarction 09/15/2017       Past Surgical History:   Procedure Laterality Date    CARDIAC CATHETERIZATION Right 7/3/2025    Procedure: Left Heart Cath with Coronary Angiography;  Surgeon: Leonard Richardson MD;  Location: [facility] CATH INVASIVE LOCATION;  Service: Cardiovascular;  Laterality: Right;    CORONARY STENT PLACEMENT  09/2017    NO PAST SURGERIES         Family History   Problem Relation Age of Onset    Heart disease Mother     Heart disease Father     Heart failure Father     No Known Problems Sister     Heart disease Brother     Heart failure Brother     Heart attack Brother     No Known Problems Maternal Aunt     No Known Problems Maternal Uncle     No Known Problems Paternal Aunt     No Known Problems Paternal Uncle     No Known Problems Maternal Grandmother     Heart disease Maternal Grandfather     Heart failure Maternal Grandfather     No Known Problems Paternal Grandmother     No Known Problems Paternal Grandfather     No Known Problems Other     Anemia Neg Hx     Arrhythmia Neg Hx     Asthma Neg  Hx     Clotting disorder Neg Hx     Fainting Neg Hx     Hyperlipidemia Neg Hx     Hypertension Neg Hx        Social History     Tobacco Use    Smoking status: Every Day     Current packs/day: 0.50     Average packs/day: 0.5 packs/day for 15.0 years (7.5 ttl pk-yrs)     Types: Cigarettes     Passive exposure: Current    Smokeless tobacco: Never   Vaping Use    Vaping status: Never Used   Substance Use Topics    Alcohol use: No    Drug use: Yes     Types: Marijuana        Home meds:  Prior to Admission medications    Medication Sig Start Date End Date Taking? Authorizing Provider   atorvastatin (LIPITOR) 40 MG tablet Take 1 tablet by mouth Every Night.    Lauro Toledo MD   carvedilol (COREG) 3.125 MG tablet Take 1 tablet by mouth Daily.    Lauro Toledo MD   clopidogrel (PLAVIX) 75 MG tablet Take 1 tablet by mouth Daily. 30 days only needs an appointment for more refills. 10/14/24   Leonard Richardson MD   doxazosin (CARDURA) 2 MG tablet Take 1 tablet by mouth every night at bedtime. 6/2/25   Lauro Toledo MD   gabapentin (NEURONTIN) 400 MG capsule Take 1 capsule by mouth 3 times a day. 5/22/25   Lauro Toledo MD   LORazepam (ATIVAN) 0.5 MG tablet Take 1 tablet by mouth 2 (Two) Times a Day. 7/26/19   Lauro Toledo MD   losartan (COZAAR) 100 MG tablet Take 1 tablet by mouth Daily. 7/24/19   Lauro Toledo MD   mupirocin (BACTROBAN) 2 % ointment Apply to nares (inside each nostril) twice daily as directed for procedure 7/14/25   Berto Yung MD   nitroglycerin (NITROSTAT) 0.4 MG SL tablet 1 under the tongue as needed for angina, may repeat q5mins for up three doses 6/27/25   Pita Hooker APRN   pantoprazole (PROTONIX) 40 MG EC tablet Take 1 tablet by mouth Daily.    Lauro Toledo MD   traMADol (ULTRAM) 50 MG tablet Take 2 tablets by mouth 2 (Two) Times a Day. 7/13/19   Lauro Toledo MD       Allergies:  Atenolol, Amlodipine, and  "Metoprolol    Scheduled Meds:aspirin, 81 mg, Oral, Daily  atorvastatin, 40 mg, Oral, Nightly  carvedilol, 3.125 mg, Oral, BID With Meals  enoxaparin sodium, 40 mg, Subcutaneous, Q12H  furosemide, 40 mg, Oral, Daily  gabapentin, 400 mg, Oral, TID  insulin lispro, 2-9 Units, Subcutaneous, 4x Daily AC & at Bedtime  mupirocin, 1 Application, Each Nare, BID  pantoprazole, 40 mg, Oral, Q AM  polyethylene glycol, 17 g, Oral, BID  potassium chloride, 20 mEq, Oral, Daily  senna-docusate sodium, 2 tablet, Oral, BID  terazosin, 2 mg, Oral, Nightly      Continuous Infusions:niCARdipine, 5-15 mg/hr, Last Rate: Stopped (25 1200)      PRN Meds:.  acetaminophen **OR** acetaminophen **OR** acetaminophen    Calcium Replacement - Follow Nurse / BPA Driven Protocol    cyclobenzaprine    dextrose    dextrose    diphenhydrAMINE    glucagon (human recombinant)    HYDROcodone-acetaminophen    Magnesium Cardiology Dose Replacement - Follow Nurse / BPA Driven Protocol    [] Morphine **AND** naloxone    niCARdipine    nitroglycerin    ondansetron    Phosphorus Replacement - Follow Nurse / BPA Driven Protocol    Potassium Replacement - Follow Nurse / BPA Driven Protocol    simethicone      OBJECTIVE    Vital Signs  Vitals:    25 0400 25 0500 25 0600 25 0700   BP: 126/73 133/76 131/76    BP Location:       Patient Position:       Pulse: 93 79 85 87   Resp:       Temp:       TempSrc:       SpO2: 94% 93% 96% 94%   Weight:   128 kg (283 lb)    Height:           Flowsheet Rows      Flowsheet Row First Filed Value   Admission Height 176.5 cm (69.49\") Documented at 2025 1145   Admission Weight 128 kg (282 lb 10.1 oz) Documented at 2025 0600              Intake/Output Summary (Last 24 hours) at 2025 0712  Last data filed at 2025 0500  Gross per 24 hour   Intake 650 ml   Output 1900 ml   Net -1250 ml          Telemetry:  paced rhythm     Physical Exam:  The patient is alert, oriented and in no " distress.  Vital signs as noted above.  Head and neck revealed no carotid bruits or jugular venous distention.  Right IJ central line in place with Aguirre Jeanna catheter.   Lungs clear with diminished bases.  No wheezing.  On room air. Chest tubes in place.   Heart normal first and second heart sounds.  No murmur. No precordial rub is present.  No gallop is present. Temporary epicardial pacing wires in place.   Abdomen soft and nontender.  No organomegaly is present.  Extremities with good peripheral pulses with BLE edema.   Skin warm and dry.  Sternal surgical incision covered with dry dressing.   Musculoskeletal system is grossly normal.  CNS grossly normal.       Results Review:  I have personally reviewed the results from the time of this admission to 7/21/2025 07:12 EDT and agree with these findings:  [x]  Laboratory  []  Microbiology  [x]  Radiology  [x]  EKG/Telemetry   [x]  Cardiology/Vascular   []  Pathology  [x]  Old records  []  Other:    Most notable findings include:    Lab Results (last 24 hours)       Procedure Component Value Units Date/Time    Basic Metabolic Panel [[id number]]  (Normal) Collected: 07/21/25 0208    Specimen: Blood Updated: 07/21/25 0235     Glucose 97 mg/dL      BUN 17.6 mg/dL      Creatinine 0.79 mg/dL      Sodium 140 mmol/L      Potassium 3.8 mmol/L      Chloride 103 mmol/L      CO2 25.5 mmol/L      Calcium 8.8 mg/dL      BUN/Creatinine Ratio 22.3     Anion Gap 11.5 mmol/L      eGFR 115.2 mL/min/1.73     Narrative:      GFR Categories in Chronic Kidney Disease (CKD)              GFR Category          GFR (mL/min/1.73)    Interpretation  G1                    90 or greater        Normal or high (1)  G2                    60-89                Mild decrease (1)  G3a                   45-59                Mild to moderate decrease  G3b                   30-44                Moderate to severe decrease  G4                    15-29                Severe decrease  G5                    14 or  less           Kidney failure    (1)In the absence of evidence of kidney disease, neither GFR category G1 or G2 fulfill the criteria for CKD.    eGFR calculation 2021 CKD-EPI creatinine equation, which does not include race as a factor    CBC (No Diff) [[id number]]  (Abnormal) Collected: 07/21/25 0208    Specimen: Blood Updated: 07/21/25 0214     WBC 8.72 10*3/mm3      RBC 4.06 10*6/mm3      Hemoglobin 11.8 g/dL      Hematocrit 35.4 %      MCV 87.2 fL      MCH 29.1 pg      MCHC 33.3 g/dL      RDW 12.5 %      RDW-SD 39.9 fl      MPV 10.0 fL      Platelets 194 10*3/mm3     POC Glucose Once [[id number]]  (Abnormal) Collected: 07/20/25 2025    Specimen: Blood Updated: 07/20/25 2027     Glucose 124 mg/dL      Comment: Serial Number: [id number]:  [id number]       POC Glucose 4x Daily Before Meals & at Bedtime [[id number]]  (Normal) Collected: 07/20/25 1810    Specimen: Blood Updated: 07/20/25 1812     Glucose 79 mg/dL      Comment: Serial Number: [id number]:  [id number]       Potassium [[id number]]  (Normal) Collected: 07/20/25 1557    Specimen: Blood Updated: 07/20/25 1621     Potassium 3.6 mmol/L     POC Glucose 4x Daily Before Meals & at Bedtime [[id number]]  (Normal) Collected: 07/20/25 1135    Specimen: Blood Updated: 07/20/25 1137     Glucose 83 mg/dL      Comment: Serial Number: [id number]:  [id number]       POC Glucose 4x Daily Before Meals & at Bedtime [[id number]]  (Normal) Collected: 07/20/25 0723    Specimen: Blood Updated: 07/20/25 0725     Glucose 85 mg/dL      Comment: Serial Number: [id number]:  [id number]               Imaging Results (Last 24 Hours)       ** No results found for the last 24 hours. **            LAB RESULTS (LAST 7 DAYS)    CBC  Results from last 7 days   Lab Units 07/21/25  0208 07/20/25  0357 07/19/25  0333 07/18/25  0300 07/17/25  1808 07/17/25  1230 07/17/25  1034 07/17/25  0730 07/15/25  0823   WBC 10*3/mm3 8.72 11.71* 17.04* 15.07* 14.56* 15.14*  --   --  9.76    RBC 10*6/mm3 4.06* 4.04* 4.01* 4.20 4.00* 4.32  --   --  5.12   HEMOGLOBIN g/dL 11.8* 11.9* 11.7* 12.4* 11.7* 12.7*  --   --  14.9   HEMOGLOBIN, POC g/dL  --   --   --   --   --   --  11.2*   < >  --    HEMATOCRIT % 35.4* 35.8* 35.8* 36.5* 34.6* 37.1*  --   --  44.7   HEMATOCRIT POC %  --   --   --   --   --   --  33*   < >  --    MCV fL 87.2 88.6 89.3 86.9 86.5 85.9  --   --  87.3   PLATELETS 10*3/mm3 194 151 143 161 167 150  --   --  249    < > = values in this interval not displayed.       BMP  Results from last 7 days   Lab Units 07/21/25 0208 07/20/25 1557 07/20/25 0357 07/19/25 0009 07/18/25 0300 07/18/25  0057 07/17/25  1230 07/15/25  0823   SODIUM mmol/L 140  --  138 139 140  --  142 139   POTASSIUM mmol/L 3.8 3.6 3.6 4.1 4.3  --  4.8 4.2   CHLORIDE mmol/L 103  --  100 103 107  --  106 104   CO2 mmol/L 25.5  --  24.9 24.9 21.7*  --  24.5 26.9   BUN mg/dL 17.6  --  18.2 17.4 17.0  --  12.2 14.2   CREATININE mg/dL 0.79  --  0.72* 0.90 1.04 0.92 1.10 0.98   GLUCOSE mg/dL 97  --  100* 103* 170*  --  104* 111*   MAGNESIUM mg/dL  --   --   --   --  2.7*  --   --   --    PHOSPHORUS mg/dL  --   --   --   --  2.6  --  3.4  --        CMP   Results from last 7 days   Lab Units 07/21/25 0208 07/20/25 1557 07/20/25 0357 07/19/25  0009 07/18/25  0300 07/18/25  0057 07/17/25  1230 07/15/25  0823   SODIUM mmol/L 140  --  138 139 140  --  142 139   POTASSIUM mmol/L 3.8 3.6 3.6 4.1 4.3  --  4.8 4.2   CHLORIDE mmol/L 103  --  100 103 107  --  106 104   CO2 mmol/L 25.5  --  24.9 24.9 21.7*  --  24.5 26.9   BUN mg/dL 17.6  --  18.2 17.4 17.0  --  12.2 14.2   CREATININE mg/dL 0.79  --  0.72* 0.90 1.04 0.92 1.10 0.98   GLUCOSE mg/dL 97  --  100* 103* 170*  --  104* 111*   ALBUMIN g/dL  --   --   --   --  4.8  --  4.7 4.2   BILIRUBIN mg/dL  --   --   --   --   --   --   --  0.4   ALK PHOS U/L  --   --   --   --   --   --   --  122*   AST (SGOT) U/L  --   --   --   --   --   --   --  20   ALT (SGPT) U/L  --   --   --   --    --   --   --  29       BNP        TROPONIN        CoAg  Results from last 7 days   Lab Units 07/18/25  0300 07/17/25  1230 07/15/25  0823   INR  1.21* 1.30* 0.95   APTT seconds  --  35.2 30.3       Creatinine Clearance  Estimated Creatinine Clearance: 165.8 mL/min (by C-G formula based on SCr of 0.79 mg/dL).    ABG  Results from last 7 days   Lab Units 07/18/25  0442 07/18/25  0301 07/18/25  0057 07/17/25  2327 07/17/25  2158 07/17/25  2108 07/17/25  1953   PH, ARTERIAL pH units 7.405 7.469* 7.284* 7.268* 7.361 7.401 7.315*   PCO2, ARTERIAL mm Hg 44.7 40.3 52.7* 57.9* 48.3* 43.1 52.8*   PO2 ART mm Hg 94.4 67.0* 68.9* 68.4* 98.8 100.4 98.2   O2 SATURATION ART % 97.3 94.1 90.8* 90.1* 97.3 97.7 96.8   BASE EXCESS ART mmol/L 2.8 5.1* -2.3* -1.4* 1.4 1.7 0.0       Radiology  No radiology results for the last day        EKG  I personally viewed and interpreted the patient's EKG/Telemetry data:  Telemetry Scan   Final Result      Telemetry Scan   Final Result      Telemetry Scan   Final Result      Telemetry Scan   Final Result      Telemetry Scan   Final Result      Telemetry Scan   Final Result      Telemetry Scan   Final Result      Telemetry Scan   Final Result      Telemetry Scan   Final Result      Telemetry Scan   Final Result      Telemetry Scan   Final Result      Telemetry Scan   Final Result      Telemetry Scan   Final Result      ECG 12 Lead Other; s/p CABG   Final Result   HEART RATE=91  bpm   RR Pqdjonaf=125  ms   VT Aqhjojso=231  ms   P Horizontal Axis=49  deg   P Front Axis=29  deg   QRSD Interval=87  ms   QT Dspqwang=312  ms   PJbN=061  ms   QRS Axis=27  deg   T Wave Axis=24  deg   - ABNORMAL ECG -   Sinus rhythm   Atrial premature complexes   Anterior  infarct, old   Estimated MI size: 21%   When compared with ECG of 18-Jul-2025 18:51:43,   No significant change   Electronically Signed By: Marcio Oscar ([facility]) 2025-07-19 19:41:48   Date and Time of Study:2025-07-19 05:52:38      Telemetry Scan    Final Result      Telemetry Scan   Final Result      Telemetry Scan   Final Result      ECG 12 Lead Rhythm Change   Final Result   HEART RATE=92  bpm   RR Jsimjssi=947  ms   FL Gxepdyyj=111  ms   P Horizontal Axis=53  deg   P Front Axis=45  deg   QRSD Interval=82  ms   QT Vwgrpokn=750  ms   NLtS=964  ms   QRS Axis=-2  deg   T Wave Axis=62  deg   - ABNORMAL ECG -   Sinus rhythm   Supraventricular bigeminy   Consider  anterior infarct   When compared with ECG of 18-Jul-2025 16:28:29,   Nonspecific significant change   Electronically Signed By: Marcio Oscar ([facility]) 2025-07-19 19:41:56   Date and Time of Study:2025-07-18 18:51:43      ECG 12 Lead Rhythm Change   Final Result   HEART RATE=84  bpm   RR Asomrnrf=937  ms   FL Yqfqdxnu=586  ms   P Horizontal Axis=11  deg   P Front Axis=58  deg   QRSD Interval=85  ms   QT Mzpucmvm=007  ms   KJoE=035  ms   QRS Axis=-10  deg   T Wave Axis=41  deg   - ABNORMAL ECG -   Sinus rhythm   Anterior  infarct, old   Estimated MI size: 27%   When compared with ECG of 18-Jul-2025 03:33:32,   No significant change   Electronically Signed By: Marcio Oscar ([facility]) 2025-07-19 19:42:04   Date and Time of Study:2025-07-18 16:28:29      Telemetry Scan   Final Result      Telemetry Scan   Final Result      Telemetry Scan   Final Result      Telemetry Scan   Final Result      ECG 12 Lead Other; s/p CABG   Final Result   HEART RATE=66  bpm   RR Feivchrj=178  ms   FL Interval=160  ms   P Horizontal Axis=38  deg   P Front Axis=18  deg   QRSD Interval=88  ms   QT Hvsldhqs=154  ms   YRcP=888  ms   QRS Axis=17  deg   T Wave Axis=26  deg   - ABNORMAL ECG -   Sinus rhythm   Anterior  infarct, old   Estimated MI size: 24%   When compared with ECG of 17-Jul-2025 12:46:16,   No significant change   Electronically Signed By: Marcio Oscar ([facility]) 2025-07-19 19:42:09   Date and Time of Study:2025-07-18 03:33:32      Telemetry Scan   Final Result      Telemetry Scan   Final Result       Telemetry Scan   Final Result      ECG 12 Lead Other; s/p CABG   Final Result   HEART RATE=73  bpm   RR Dhikfznu=927  ms   NV Redotpfv=315  ms   P Horizontal Axis=34  deg   P Front Axis=28  deg   QRSD Interval=92  ms   QT Kxmaqahb=011  ms   DEkO=783  ms   QRS Axis=8  deg   T Wave Axis=-16  deg   - ABNORMAL ECG -   Sinus rhythm   Anterior  infarct, old   Estimated MI size: 24%   When compared with ECG of 15-Jul-2025 09:10:13,   No significant change   Electronically Signed By: Alexis Mcknight ([facility]) 2025-07-17 16:53:51   Date and Time of Study:2025-07-17 12:46:16      Telemetry Scan   Final Result      Telemetry Scan   Final Result            Echocardiogram:    Results for orders placed in visit on 07/17/25    Intra-Op Anesthesia KIARA     Narrative  Intra-Op Anesthesia KIARA    Procedure Performed: Intra-Op Anesthesia KIARA  Start Time:  7/17/2025 7:25 AM  End Time:   7/17/2025 7:30 AM    Preanesthesia Checklist:  Patient identified, IV assessed, risks and benefits discussed, monitors and equipment assessed, procedure being performed at surgeon's request and anesthesia consent obtained.    General Procedure Information  Diagnostic Indications for Echo:  assessment of ascending aorta, assessment of surgical repair, defect repair evaluation and hemodynamic monitoring  Location performed:  OR  Intubated  Bite block placed  Heart visualized  Probe Insertion:  Easy  Probe Type:  Multiplane  Modalities:  Color flow mapping, continuous wave Doppler and pulse wave Doppler    Echocardiographic and Doppler Measurements    Ventricles    Right Ventricle:  Cavity size normal.  Hypertrophy not present.  Thrombus not present.  Global function normal.  Left Ventricle:  Cavity size normal.  Thrombus not present.  Global Function mildly impaired.  Ejection Fraction 45%.        Valves    Aortic Valve:  Annulus normal.  Stenosis not present.  Regurgitation absent.  Leaflets normal.  Leaflet motions normal.    Mitral Valve:  Annulus normal.   Stenosis not present.  Regurgitation trace.  Leaflets normal.  Leaflet motions normal.    Tricuspid Valve:  Annulus normal.  Stenosis not present.  Regurgitation trace.  Leaflets normal.  Leaflet motions normal.  Pulmonic Valve:  Annulus normal.      Aorta    Ascending Aorta:  Size normal.  Dissection not present.  Plaque thickness less than 3 mm.  Mobile plaque not present.  Aortic Arch:  Size normal.  Dissection not present.  Plaque thickness less than 3 mm.  Mobile plaque not present.  Descending Aorta:  Size normal.  Dissection not present.  Plaque thickness less than 3 mm.  Mobile plaque not present.      Atria    Right Atrium:  Size normal.  Spontaneous echo contrast not present.  Thrombus not present.  Tumor not present.  Device not present.    Left Atrium:  Size normal.  Spontaneous echo contrast not present.  Thrombus not present.  Tumor not present.  Device not present.  Left atrial appendage normal.      Septa        Ventricular Septum:  Intra-ventricular septum morphology normal.        Other Findings  Pericardium:  normal  Pleural Effusion:  none  Pulmonary Arteries:  normal  Pulmonary Venous Flow:  normal    Anesthesia Information  Performed Personally  Anesthesiologist:  Nikolas Honeycutt MD        Stress Test:         Cardiac Catheterization:  Results for orders placed during the hospital encounter of 07/03/25    Cardiac Catheterization/Vascular Study    Conclusion  :  1. Leonard Richardson M.D., Attending Cardiologist      PROCEDURE PERFORMED.  Ultrasound guided vascular access  Left heart catheterization  Coronary Angiogram  Moderate Sedation    INDICATIONS FOR PROCEDURE.  39 years old man with known coronary disease and previous PCI presents with signs and symptoms consistent with unstable angina.  After discussing the risk and benefits of the procedure he was offered coronary angiography.      PROCEDURE IN DETAIL.  Informed consent was obtained from the patient after explaining the risks,  benefits, and alternative options of the procedure. After obtaining informed consent, the patient was brought to the cath lab and was prepped in a sterile fashion. Lidocaine 1% was used for local anesthesia into the right radial access site. The right radial artery was accessed under direct ultrasound visualization  with an angiocath needle via modified Seldinger technique. A 6F slender sheath was inserted successfully. Afterwards, 6F JR4  was advanced over a wire into the ascending aorta and used to cross the AV and obtain LV pressures.  AV gradient obtained via pullback technique. JR4 and JL3.5 diagnostic catheters were used to engage the ostia of the RCA and LM respectively. Images of the right and left coronary systems were obtained. All the catheters were exchanged over a wire and subsequently removed. The patient tolerated the procedure well without any complications. The pictures were reviewed at the end of the procedure. TR band applied to right wrist for hemostasis and inflated with 15 cc of air. No complications were encountered.    HEMODYNAMICS.  LV: 122/6, 70 mmHg  AO: 131/87, 105 mmHg  No significant gradient across the aortic valve during pullback of JR4 catheter.  LV gram was not performed due to recent echocardiogram.    FINDINGS.    Coronary Angiogram.    1. Left main. Left main is a large-caliber vessel which gives rise to the Left Anterior Descending and the Left circumflex.  Left main is angiographically free from any significant disease    2. Left Anterior Descending Artery. LAD is a large vessel which gives rise to several septal perforators and several diagonal branches.  Proximal LAD has 60% stenosis at the origin of first diagonal branch.  Mid LAD has a stent with 70 to 80% in-stent restenosis.  There is a jailed diagonal 2 with ostial 80-90 % stenosis.    3. Left Circumflex. The LCx is a medium caliber which gives rise to marginals.  Left circumflex artery has diffuse nonobstructive 10 to  20% disease.    4. Right Coronary Artery. The RCA is a dominant vessel which gives rise to several small caliber branches including PDA and PLV.  Proximal RCA has 20%, mid RCA has 30 to 40% and distal RCA has 60% stenosis in the PLV.    IMPRESSIONS.  1.  Multivessel coronary artery disease as described above including significant ISR of mid LAD stent involving diagonals  2.  Mildly elevated LVEDP    RECOMMENDATIONS.  1.  Cardiac surgery referral.  2.  Smoking cessation.  3.  Continue risk factors modification    Electronically signed by Leonard Richardson MD, 07/03/25, 11:49 AM EDT.         Other:         ASSESSMENT & PLAN:    Principal Problem:    Coronary heart disease  Active Problems:    CAD (coronary artery disease)      Coronary artery disease  Premature coronary artery disease  History of STEMI and PCI with a 2.5 x 20 mm drug-eluting stent in 2017  Status post CABG x 3 with LIMA to LAD, vein graft to diagonal, vein graft to PLV/RCA on 7/17/2025.  Continue aspirin, statin, beta-blocker  Continue Lasix  Incentive spirometry  Cardiac rehab  We will switch him to Plavix long-term.     Primary Hypertension, chronic  Blood pressure and heart rate are currently normal.  Continue Coreg.  Add low-dose losartan     Heart failure with midrange ejection fraction  Echocardiogram shows EF of 45%, LVIDD 5.9 cm.  RVSP 42 mmHg.  Continue Coreg  Add losartan  Continue diuretics  Monitor I's and O's and replace electrolytes as needed  Add Jardiance    Hyperlipidemia  Continue high intensity statin  LDL 78, HDL 29, triglycerides 126 and total cholesterol 130  Goal LDL less than 55  A1c is 5     Palpitations  Occasional and associated with anxiety  Continue Coreg     Tobacco abuse  Smoking cessation counseling provided to the patient  He has cut down but continues to smoke     Obesity  BMI is 41.2.  He weighs 283 pounds.  Lifestyle modifications recommended to the patient.  Obesity complicates all aspects of his care

## 2025-07-21 NOTE — PLAN OF CARE
Goal Outcome Evaluation:  Plan of Care Reviewed With: patient  ed mobility - Supervision  Transfers - Modified-Independent  Ambulation - 440 feet SBA    STAIRS: PT ascended and descended 8 steps with SBA of 1 step over step with ascent and step to step with descent    Vitals: WNL    Pain: 2 VAS Location: chest  Intervention for pain: Repositioned and Increased Activity    Education: Post-Op Precautions    Assessment: Clark Randolph presents with functional mobility impairments which indicate the need for skilled intervention. Pt is POD #4 for CABG. Pt demonstrated good understanding of sternal precautions and use of heart hugger. Pt ambulating without difficulty and he did very well on steps. PT will complete orders. Tolerating session today without incident. Will continue to follow and progress as tolerated.      Anticipated Discharge Disposition (PT): home with assist

## 2025-07-21 NOTE — SIGNIFICANT NOTE
07/21/25 1347   OTHER   Discipline occupational therapist   Rehab Time/Intention   Session Not Performed other (see comments)  (pt with current dc orders, will follow up if pt remains admitted)   Therapy Assessment/Plan (PT)   Criteria for Skilled Interventions Met (PT) yes;meets criteria;skilled treatment is necessary   Recommendation   OT - Next Appointment 07/22/25

## 2025-07-21 NOTE — OUTREACH NOTE
Prep Survey      Flowsheet Row Responses   [facility] facility patient discharged from? [facility]   Is LACE score < 7 ? No   Eligibility Readm Mgmt   Discharge diagnosis Sternotomy, CABGx4   Does the patient have one of the following disease processes/diagnoses(primary or secondary)? Cardiothoracic surgery   Does the patient have Home health ordered? No   Is there a DME ordered? No   Prep survey completed? Yes            ROGERIO ARROYO - Registered Nurse

## 2025-07-21 NOTE — PAYOR COMM NOTE
"This is discharge notification for Kayal Mims  Reference/Auth # [id number]   Pt discharged on 7/21/25    Dotty Fried, RN, BSN  Utilization Review Nurse  [facility]  Direct & confidential phone # [phone]  Fax # [phone]        Kayla Mims (39 y.o. Male)       Date of Birth   1985    Social Security Number       Address   [address] [place] [address]    Home Phone   [phone]    MRN   [id number]       Quaker   None    Marital Status                               Admission Date   7/17/2025    Admission Type   Elective    Admitting Provider   Berto Yung MD    Attending Provider       Department, Room/Bed   [facility] CARDIOVASCULAR CARE UNIT, 3116/1       Discharge Date   7/21/2025    Discharge Disposition   Home or Self Care    Discharge Destination                                 Attending Provider: (none)   Allergies: Atenolol, Amlodipine, Metoprolol    Isolation: None   Infection: None   Code Status: CPR    Ht: 176.5 cm (69.49\")   Wt: 128 kg (283 lb)    Admission Cmt: None   Principal Problem: Coronary heart disease [I25.10]                   Active Insurance as of 7/17/2025       Primary Coverage       Payor Plan Insurance Group Employer/Plan Group    [facility] BLUE [facility] PPO [id number]       Payor Plan Address Payor Plan Phone Number Payor Plan Fax Number Effective Dates    [address] [phone]  1/1/2024 - None Entered    [place] [address]         Subscriber Name Subscriber Birth Date Member ID       KAYLA MIMS 1985 [id number]                     Emergency Contacts        (Rel.) Home Phone Work Phone Mobile Phone    DIMA MIMS (Spouse) [phone] -- [phone]                 Discharge Summary        Carolina Smith APRN at 07/21/25 0940       Attestation signed by Berto Yung MD at 07/21/25 1428      I have reviewed this documentation and " agree.                      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 male who was admitted to the hospital on 7/17/25 for elective same day surgery. That morning he was taken to the Operating Room and under general anesthesia and via median sternotomy underwent CABG x 3 (LIMA to LAD, SVG to diagonal, SVG to PL of RCA) by Dr. Yung. Patient tolerated the procedure well and was transported to the Cardiovascular Unit in stable condition. Later that evening patient was successfully weaned from the ventilator and extubated. On post-operative day #1 his [place], arterial line, chest tubes, and velez catheter were removed. On post-operative day #2 his central line and epicardial pacing wires were removed. On post-operative day #3 an overnight oximetry was performed showing 1 min of  desaturation time, indicating no need for nocturnal oxygen at discharge.  PT goals met.  PO intake adequate.  Urinating and defecating normally.  Midsternal chest tube incisions healing well, no redness or drainage.  Today, on POD #4 patient was deemed appropriate for discharge.  Plavix will be resumed at discharge given his history of drug-eluting stent.  Appropriate discharge teaching was performed.  Patient and family verbalized understanding.  He is to follow-up in office as noted below.  Patient was encouraged to call our office with any questions or concerns.    Procedures Performed:  7/17/25 Dr. Yung  1. CABG x 3 (LIMA to LAD, SVG to Diagona, SVG to PL of RCA)  2. EVH of the left legs    Consults:   Consults       Date and Time Order Name Status Description    7/18/2025  1:34 AM Inpatient Intensivist Consult Completed     7/17/2025 11:50 AM Inpatient Cardiology Consult Completed             Pertinent Test Results:    Lab Results   Component Value Date    WBC 8.72 07/21/2025    HGB 11.8 (L) 07/21/2025    HCT 35.4 (L) 07/21/2025    MCV 87.2 07/21/2025     07/21/2025      Lab Results   Component Value Date    GLUCOSE 97 07/21/2025    CALCIUM 8.8 07/21/2025     07/21/2025    K 3.8 07/21/2025    CO2 25.5 07/21/2025     07/21/2025    BUN 17.6 07/21/2025    CREATININE 0.79 07/21/2025    BCR 22.3 07/21/2025    ANIONGAP 11.5 07/21/2025     Lab Results   Component Value Date    INR 1.21 (H) 07/18/2025    PROTIME 15.3 (H) 07/18/2025         Condition on Discharge: Stable    Vital Signs  Temp:  [97.9 °F (36.6 °C)-98.9 °F (37.2 °C)] 97.9 °F (36.6 °C)  Heart Rate:  [79-98] 88  Resp:  [16-24] 17  BP: ()/(63-87) 120/73  Body mass index is 41.21 kg/m².    Discharge Disposition: Home or Self Care    Discharge Medications     Discharge Medications        New Medications        Instructions Start Date   aspirin 81 MG EC tablet   81 mg, Oral, Daily   Start Date: July 22, 2025     furosemide 20 MG  tablet  Commonly known as: LASIX   20 mg, Oral, Daily   Start Date: July 22, 2025     HYDROcodone-acetaminophen 5-325 MG per tablet  Commonly known as: NORCO   1 tablet, Oral, Every 4 Hours PRN      potassium chloride 10 MEQ CR tablet  Commonly known as: KLOR-CON M10   10 mEq, Oral, Daily   Start Date: July 22, 2025            Changes to Medications        Instructions Start Date   losartan 25 MG tablet  Commonly known as: COZAAR  What changed:   medication strength  how much to take  when to take this   25 mg, Oral, Every 24 Hours Scheduled   Start Date: July 22, 2025            Continue These Medications        Instructions Start Date   atorvastatin 40 MG tablet  Commonly known as: LIPITOR   40 mg, Nightly      carvedilol 3.125 MG tablet  Commonly known as: COREG   3.125 mg, Daily      clopidogrel 75 MG tablet  Commonly known as: PLAVIX   75 mg, Oral, Daily, 30 days only needs an appointment for more refills.      doxazosin 2 MG tablet  Commonly known as: CARDURA   2 mg, Every Night at Bedtime      gabapentin 400 MG capsule  Commonly known as: NEURONTIN   1 capsule, 3 times daily      LORazepam 0.5 MG tablet  Commonly known as: ATIVAN   0.5 mg, 2 Times Daily      nitroglycerin 0.4 MG SL tablet  Commonly known as: NITROSTAT   1 under the tongue as needed for angina, may repeat q5mins for up three doses      pantoprazole 40 MG EC tablet  Commonly known as: PROTONIX   40 mg, Daily      traMADol 50 MG tablet  Commonly known as: ULTRAM   2 tablets, 2 Times Daily             Stop These Medications      mupirocin 2 % ointment  Commonly known as: BACTROBAN              Discharge Diet:     Activity at Discharge:   Activity Instructions       Activity as Tolerated      Bathing Restrictions      No tub baths, hot tubs/jacuzzi tubs, swimming pools, rivers, lakes, oceans for 6 weeks    Type of Restriction: Bathing    Bathing Restrictions: No Tub Bath    Driving Restrictions      Do not resume driving while taking narcotic pain  medication    Type of Restriction: Driving    Driving Restrictions: No Driving (Time Limited)    Length: 2 Weeks    Lifting Restrictions      Type of Restriction: Lifting    Lifting Restrictions: Lifting Restriction (Indicate Limit)    Weight Limit (Pounds): 10    Length of Lifting Restriction: 6 weeks            Follow-up Appointments  Future Appointments   Date Time Provider Department Center   8/7/2025  1:30 PM Ariana Carroll APRN [facility]     Additional Instructions for the Follow-ups that You Need to Schedule       Call MD With Problems / Concerns   Jul 21, 2025      Instructions: Call for temp >101 or any surgical wound drainage.    Order Comments: Instructions: Call for temp >101 or any surgical wound drainage.         Discharge Follow-up with PCP   As directed       Currently Documented PCP:    Kwadwo Tovar MD    PCP Phone Number:    [phone]     Follow Up Details: in one month        Discharge Follow-up with Specialty: Cardiology; 2 Weeks   As directed      Specialty: Cardiology   Follow Up: 2 Weeks        Discharge Follow-up with Specified Provider: Cardiac Surgery; 3 Weeks   As directed      To: Cardiac Surgery   Follow Up: 3 Weeks                Test Results Pending at Discharge  Pending Results       Procedure [Order ID] Specimen - Date/Time    Potassium [[id number]]     Specimen: Blood             ISIDRO Lepe  07/21/25  12:29 EDT              Electronically signed by Berto Yung MD at 07/21/25 0647

## 2025-07-21 NOTE — THERAPY TREATMENT NOTE
Subjective: Pt agreeable to therapeutic plan of care.    Objective:   Phase 1 Cardiac Rehab Initiated - Acute Care    Cardiac Level III Activities  Sitting tolerance: 5-10min and unsupported  Standing tolerance: 5-10min and unsupported    Precautions:  Mid-sternal incision; avoid scapular retraction and depression.  Cardiovascular impairment post-sx; encourage energy conservation strategies.    Therapeutic Exercise: 10 reps UE and LE AROM in supported standing and unsupported sitting    MET level equivalent: 3.0-3.5 (Standing ADLs / showering, slow stair climbing up to 2 flights, unlimited ambulation on flat surfaces up to 15mins)   Precautions - sternal precautions    Bed mobility - Supervision  Transfers - Modified-Independent  Ambulation - 440 feet SBA    STAIRS: PT ascended and descended 8 steps with SBA of 1 step over step with ascent and step to step with descent    Vitals: WNL    Pain: 2 VAS Location: chest  Intervention for pain: Repositioned and Increased Activity    Education: Post-Op Precautions    Assessment: Clark Randolph presents with functional mobility impairments which indicate the need for skilled intervention. Pt is POD #4 for CABG. Pt demonstrated good understanding of sternal precautions and use of heart hugger. Pt ambulating without difficulty and he did very well on steps. PT will complete orders. Tolerating session today without incident. Will continue to follow and progress as tolerated.     Plan/Recommendations:   If medically appropriate, No ongoing therapy recommended post-acute care. No therapy needs. Pt requires no DME at discharge.     Pt desires Home with family assist at discharge. Pt cooperative; agreeable to therapeutic recommendations and plan of care.         Basic Mobility 6-click:  Rollin = Total, A lot = 2, A little = 3; 4 = None  Supine>Sit:   1 = Total, A lot = 2, A little = 3; 4 = None   Sit>Stand with arms:  1 = Total, A lot = 2, A little = 3; 4 =  None  Bed>Chair:   1 = Total, A lot = 2, A little = 3; 4 = None  Ambulate in room:  1 = Total, A lot = 2, A little = 3; 4 = None  3-5 Steps with railin = Total, A lot = 2, A little = 3; 4 = None  Score: 23    Modified [place]: N/A = No pre-op stroke/TIA    Post-Tx Position: Up in Chair, Alarms activated, and Call light and personal items within reach  PPE: gloves    Therapy Charges for Today       Code Description Service Date Service Provider Modifiers Qty    98328374295 HC GAIT TRAINING EA 15 MIN 2025 Taylor Le, PT GP 1    03226341807 HC PT THER PROC EA 15 MIN 2025 Taylor Le, PT GP 1           PT Charges       Row Name 25 0842             Time Calculation    Start Time 0825  -BR      Stop Time 0842  -BR      Time Calculation (min) 17 min  -BR      PT Received On 25  -BR      PT - Next Appointment 25  -BR         Time Calculation- PT    Total Timed Code Minutes- PT 17 minute(s)  -BR                User Key  (r) = Recorded By, (t) = Taken By, (c) = Cosigned By      Initials Name Provider Type    BR Taylor Le, PT Physical Therapist

## 2025-07-21 NOTE — DISCHARGE SUMMARY
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 male who was admitted to the hospital on 7/17/25 for elective same day surgery. That morning he was taken to the Operating Room and under general anesthesia and via median sternotomy underwent CABG x 3 (LIMA to LAD, SVG to diagonal, SVG to PL of RCA) by Dr. Yung. Patient tolerated the procedure well and was transported to the Cardiovascular Unit in stable condition. Later that evening patient was successfully weaned from the ventilator and extubated. On post-operative day #1 his [place], arterial line, chest tubes, and velez catheter were removed. On post-operative day #2 his central line and epicardial pacing wires were removed. On post-operative day #3 an overnight oximetry was performed showing 1 min of desaturation time, indicating no  need for nocturnal oxygen at discharge.  PT goals met.  PO intake adequate.  Urinating and defecating normally.  Midsternal chest tube incisions healing well, no redness or drainage.  Today, on POD #4 patient was deemed appropriate for discharge.  Plavix will be resumed at discharge given his history of drug-eluting stent.  Appropriate discharge teaching was performed.  Patient and family verbalized understanding.  He is to follow-up in office as noted below.  Patient was encouraged to call our office with any questions or concerns.    Procedures Performed:  7/17/25 Dr. Yung  1. CABG x 3 (LIMA to LAD, SVG to Diagona, SVG to PL of RCA)  2. EVH of the left legs    Consults:   Consults       Date and Time Order Name Status Description    7/18/2025  1:34 AM Inpatient Intensivist Consult Completed     7/17/2025 11:50 AM Inpatient Cardiology Consult Completed             Pertinent Test Results:    Lab Results   Component Value Date    WBC 8.72 07/21/2025    HGB 11.8 (L) 07/21/2025    HCT 35.4 (L) 07/21/2025    MCV 87.2 07/21/2025     07/21/2025      Lab Results   Component Value Date    GLUCOSE 97 07/21/2025    CALCIUM 8.8 07/21/2025     07/21/2025    K 3.8 07/21/2025    CO2 25.5 07/21/2025     07/21/2025    BUN 17.6 07/21/2025    CREATININE 0.79 07/21/2025    BCR 22.3 07/21/2025    ANIONGAP 11.5 07/21/2025     Lab Results   Component Value Date    INR 1.21 (H) 07/18/2025    PROTIME 15.3 (H) 07/18/2025         Condition on Discharge: Stable    Vital Signs  Temp:  [97.9 °F (36.6 °C)-98.9 °F (37.2 °C)] 97.9 °F (36.6 °C)  Heart Rate:  [79-98] 88  Resp:  [16-24] 17  BP: ()/(63-87) 120/73  Body mass index is 41.21 kg/m².    Discharge Disposition: Home or Self Care    Discharge Medications     Discharge Medications        New Medications        Instructions Start Date   aspirin 81 MG EC tablet   81 mg, Oral, Daily   Start Date: July 22, 2025     furosemide 20 MG tablet  Commonly known as:  LASIX   20 mg, Oral, Daily   Start Date: July 22, 2025     HYDROcodone-acetaminophen 5-325 MG per tablet  Commonly known as: NORCO   1 tablet, Oral, Every 4 Hours PRN      potassium chloride 10 MEQ CR tablet  Commonly known as: KLOR-CON M10   10 mEq, Oral, Daily   Start Date: July 22, 2025            Changes to Medications        Instructions Start Date   losartan 25 MG tablet  Commonly known as: COZAAR  What changed:   medication strength  how much to take  when to take this   25 mg, Oral, Every 24 Hours Scheduled   Start Date: July 22, 2025            Continue These Medications        Instructions Start Date   atorvastatin 40 MG tablet  Commonly known as: LIPITOR   40 mg, Nightly      carvedilol 3.125 MG tablet  Commonly known as: COREG   3.125 mg, Daily      clopidogrel 75 MG tablet  Commonly known as: PLAVIX   75 mg, Oral, Daily, 30 days only needs an appointment for more refills.      doxazosin 2 MG tablet  Commonly known as: CARDURA   2 mg, Every Night at Bedtime      gabapentin 400 MG capsule  Commonly known as: NEURONTIN   1 capsule, 3 times daily      LORazepam 0.5 MG tablet  Commonly known as: ATIVAN   0.5 mg, 2 Times Daily      nitroglycerin 0.4 MG SL tablet  Commonly known as: NITROSTAT   1 under the tongue as needed for angina, may repeat q5mins for up three doses      pantoprazole 40 MG EC tablet  Commonly known as: PROTONIX   40 mg, Daily      traMADol 50 MG tablet  Commonly known as: ULTRAM   2 tablets, 2 Times Daily             Stop These Medications      mupirocin 2 % ointment  Commonly known as: BACTROBAN              Discharge Diet:     Activity at Discharge:   Activity Instructions       Activity as Tolerated      Bathing Restrictions      No tub baths, hot tubs/jacuzzi tubs, swimming pools, rivers, lakes, oceans for 6 weeks    Type of Restriction: Bathing    Bathing Restrictions: No Tub Bath    Driving Restrictions      Do not resume driving while taking narcotic pain medication    Type of  Restriction: Driving    Driving Restrictions: No Driving (Time Limited)    Length: 2 Weeks    Lifting Restrictions      Type of Restriction: Lifting    Lifting Restrictions: Lifting Restriction (Indicate Limit)    Weight Limit (Pounds): 10    Length of Lifting Restriction: 6 weeks            Follow-up Appointments  Future Appointments   Date Time Provider Department Center   8/7/2025  1:30 PM Ariana Carroll APRN [facility]     Additional Instructions for the Follow-ups that You Need to Schedule       Call MD With Problems / Concerns   Jul 21, 2025      Instructions: Call for temp >101 or any surgical wound drainage.    Order Comments: Instructions: Call for temp >101 or any surgical wound drainage.         Discharge Follow-up with PCP   As directed       Currently Documented PCP:    Kwadwo Tovar MD    PCP Phone Number:    [phone]     Follow Up Details: in one month        Discharge Follow-up with Specialty: Cardiology; 2 Weeks   As directed      Specialty: Cardiology   Follow Up: 2 Weeks        Discharge Follow-up with Specified Provider: Cardiac Surgery; 3 Weeks   As directed      To: Cardiac Surgery   Follow Up: 3 Weeks                Test Results Pending at Discharge  Pending Results       Procedure [Order ID] Specimen - Date/Time    Potassium [[id number]]     Specimen: Blood             ISIDRO Lepe  07/21/25  12:29 EDT

## 2025-07-21 NOTE — CASE MANAGEMENT/SOCIAL WORK
Case Management Discharge Note      Final Note: CM reviewed chart documentation for clinical updates. No additional service needs identified upon chart review. No high cost meds. On room air. No barriers.      Transportation Services  Transportation: Private Transportation  Private: Car (with family)    Final Discharge Disposition Code: 01 - home or self-care

## 2025-08-04 ENCOUNTER — OFFICE VISIT (OUTPATIENT)
Dept: CARDIAC SURGERY | Facility: CLINIC | Age: 40
End: 2025-08-04
Payer: COMMERCIAL

## 2025-08-04 VITALS
BODY MASS INDEX: 39.65 KG/M2 | DIASTOLIC BLOOD PRESSURE: 67 MMHG | WEIGHT: 277 LBS | RESPIRATION RATE: 18 BRPM | SYSTOLIC BLOOD PRESSURE: 130 MMHG | HEIGHT: 70 IN | HEART RATE: 83 BPM | TEMPERATURE: 97.7 F | OXYGEN SATURATION: 98 %

## 2025-08-04 DIAGNOSIS — Z95.1 S/P CABG X 3: Primary | ICD-10-CM

## 2025-08-04 PROCEDURE — 99024 POSTOP FOLLOW-UP VISIT: CPT | Performed by: NURSE PRACTITIONER

## 2025-08-04 RX ORDER — CARVEDILOL 3.12 MG/1
3.12 TABLET ORAL 2 TIMES DAILY WITH MEALS
Qty: 60 TABLET | Refills: 1 | Status: SHIPPED | OUTPATIENT
Start: 2025-08-04

## 2025-08-08 ENCOUNTER — OFFICE VISIT (OUTPATIENT)
Dept: CARDIOLOGY | Facility: CLINIC | Age: 40
End: 2025-08-08
Payer: COMMERCIAL

## 2025-08-08 VITALS
BODY MASS INDEX: 40.09 KG/M2 | SYSTOLIC BLOOD PRESSURE: 114 MMHG | HEART RATE: 80 BPM | HEIGHT: 70 IN | DIASTOLIC BLOOD PRESSURE: 69 MMHG | WEIGHT: 280 LBS | OXYGEN SATURATION: 96 %

## 2025-08-08 DIAGNOSIS — Z95.1 S/P CABG X 3: ICD-10-CM

## 2025-08-08 DIAGNOSIS — E66.01 MORBID OBESITY WITH BMI OF 40.0-44.9, ADULT: Chronic | ICD-10-CM

## 2025-08-08 DIAGNOSIS — I25.10 CORONARY ARTERY DISEASE INVOLVING NATIVE CORONARY ARTERY OF NATIVE HEART WITHOUT ANGINA PECTORIS: Primary | ICD-10-CM

## 2025-08-08 DIAGNOSIS — E78.2 MIXED HYPERLIPIDEMIA: Chronic | ICD-10-CM

## 2025-08-08 DIAGNOSIS — Z09 HOSPITAL DISCHARGE FOLLOW-UP: ICD-10-CM

## 2025-08-08 DIAGNOSIS — I10 PRIMARY HYPERTENSION: Chronic | ICD-10-CM

## 2025-08-08 PROBLEM — I21.9 MYOCARDIAL INFARCTION: Status: RESOLVED | Noted: 2017-09-28 | Resolved: 2025-08-08

## 2025-08-08 PROBLEM — R07.9 CHEST PAIN: Status: RESOLVED | Noted: 2025-06-27 | Resolved: 2025-08-08

## 2025-08-08 PROBLEM — I20.0 UNSTABLE ANGINA: Status: RESOLVED | Noted: 2025-06-18 | Resolved: 2025-08-08

## 2025-08-08 RX ORDER — FOLIC ACID 1 MG/1
1 TABLET ORAL DAILY
COMMUNITY

## 2025-08-08 RX ORDER — CLOPIDOGREL BISULFATE 75 MG/1
75 TABLET ORAL DAILY
Qty: 90 TABLET | Refills: 3 | Status: SHIPPED | OUTPATIENT
Start: 2025-08-08

## 2025-08-12 ENCOUNTER — TELEPHONE (OUTPATIENT)
Dept: CARDIAC SURGERY | Facility: CLINIC | Age: 40
End: 2025-08-12
Payer: COMMERCIAL

## 2025-08-25 RX ORDER — LOSARTAN POTASSIUM 25 MG/1
25 TABLET ORAL
Qty: 90 TABLET | Refills: 0 | Status: CANCELLED | OUTPATIENT
Start: 2025-08-25 | End: 2025-11-23

## 2025-08-26 ENCOUNTER — OFFICE VISIT (OUTPATIENT)
Dept: CARDIOLOGY | Facility: CLINIC | Age: 40
End: 2025-08-26
Payer: COMMERCIAL

## 2025-08-26 VITALS
HEART RATE: 101 BPM | WEIGHT: 272 LBS | DIASTOLIC BLOOD PRESSURE: 89 MMHG | OXYGEN SATURATION: 97 % | SYSTOLIC BLOOD PRESSURE: 127 MMHG | BODY MASS INDEX: 38.94 KG/M2 | HEIGHT: 70 IN

## 2025-08-26 DIAGNOSIS — E66.01 MORBID OBESITY WITH BMI OF 40.0-44.9, ADULT: Chronic | ICD-10-CM

## 2025-08-26 DIAGNOSIS — E78.2 MIXED HYPERLIPIDEMIA: Chronic | ICD-10-CM

## 2025-08-26 DIAGNOSIS — R00.0 TACHYCARDIA: ICD-10-CM

## 2025-08-26 DIAGNOSIS — R00.2 PALPITATIONS: ICD-10-CM

## 2025-08-26 DIAGNOSIS — Z95.1 S/P CABG X 3: ICD-10-CM

## 2025-08-26 DIAGNOSIS — I25.10 CORONARY ARTERY DISEASE INVOLVING NATIVE CORONARY ARTERY OF NATIVE HEART WITHOUT ANGINA PECTORIS: Primary | Chronic | ICD-10-CM

## 2025-08-26 DIAGNOSIS — K21.9 GASTROESOPHAGEAL REFLUX DISEASE, UNSPECIFIED WHETHER ESOPHAGITIS PRESENT: ICD-10-CM

## 2025-08-26 DIAGNOSIS — I50.22 CHRONIC HEART FAILURE WITH MILDLY REDUCED EJECTION FRACTION (HFMREF, 41-49%): Chronic | ICD-10-CM

## 2025-08-26 DIAGNOSIS — I10 PRIMARY HYPERTENSION: Chronic | ICD-10-CM

## 2025-08-26 PROBLEM — E78.5 HYPERLIPIDEMIA: Chronic | Status: ACTIVE | Noted: 2017-09-28

## 2025-08-26 RX ORDER — CARVEDILOL 6.25 MG/1
6.25 TABLET ORAL 2 TIMES DAILY WITH MEALS
Qty: 180 TABLET | Refills: 1
Start: 2025-08-26

## 2025-08-26 RX ORDER — LOSARTAN POTASSIUM 25 MG/1
25 TABLET ORAL
Qty: 90 TABLET | Refills: 3 | Status: SHIPPED | OUTPATIENT
Start: 2025-08-26 | End: 2026-08-21

## 2025-08-26 RX ORDER — CARVEDILOL 3.12 MG/1
3.12 TABLET ORAL 2 TIMES DAILY WITH MEALS
Qty: 180 TABLET | Refills: 3 | Status: SHIPPED | OUTPATIENT
Start: 2025-08-26 | End: 2025-08-26 | Stop reason: DRUGHIGH

## 2025-08-26 RX ORDER — PANTOPRAZOLE SODIUM 40 MG/1
40 TABLET, DELAYED RELEASE ORAL DAILY
Qty: 90 TABLET | Refills: 3 | Status: SHIPPED | OUTPATIENT
Start: 2025-08-26

## 2025-08-26 RX ORDER — CLOPIDOGREL BISULFATE 75 MG/1
75 TABLET ORAL DAILY
Qty: 90 TABLET | Refills: 3 | Status: SHIPPED | OUTPATIENT
Start: 2025-08-26

## (undated) DEVICE — CATH DIAG IMPULSE FR4 6F 100CM

## (undated) DEVICE — BNDG,ELSTC,MATRIX,STRL,4"X5YD,LF,HOOK&LP: Brand: MEDLINE

## (undated) DEVICE — PK OPN HEART WHT WRP 50

## (undated) DEVICE — SYR LL TP 10ML STRL

## (undated) DEVICE — SYS PERFUS SEP PLATLT W TIPS CUST

## (undated) DEVICE — PK PERFUS CUST W/CARDIOPLEGIA

## (undated) DEVICE — SUT PDS2 0 CT1 27IN Z340H MF VIL

## (undated) DEVICE — HEMOCONCENTRATOR PERFUS LPS06

## (undated) DEVICE — BG BLD SYS

## (undated) DEVICE — SUT SILK 0 CT1 CR8 18IN C021D

## (undated) DEVICE — CATH F6 INF TL JL 3.5 100 CM: Brand: INFINITI

## (undated) DEVICE — SUT PROLN 6/0 RB2 D/A 30IN 8711H

## (undated) DEVICE — SUP ARMBRD HANDAID ART/LINE 9IN

## (undated) DEVICE — BLOOD TRANSFUSION FILTER: Brand: HAEMONETICS

## (undated) DEVICE — SOL IRR H2O BO 1000ML STRL

## (undated) DEVICE — SOL IRR NACL 0.9PCT BO 1000ML

## (undated) DEVICE — SUT PROLN 4/0 V7 36IN 8975H

## (undated) DEVICE — PRESSURE TUBING: Brand: TRUWAVE

## (undated) DEVICE — SUT SILK 2/0 TIES 18IN A185H

## (undated) DEVICE — SAFELINER OUTER SHELL SUCTION CANISTER: Brand: DEROYAL

## (undated) DEVICE — TR BAND RADIAL ARTERY COMPRESSION DEVICE: Brand: TR BAND

## (undated) DEVICE — INTENDED FOR TISSUE SEPARATION, AND OTHER PROCEDURES THAT REQUIRE A SHARP SURGICAL BLADE TO PUNCTURE OR CUT.: Brand: BARD-PARKER ® CARBON RIB-BACK BLADES

## (undated) DEVICE — CONNECT Y INTERSEPT W/LL 3/8 X 3/8 X 3/8IN

## (undated) DEVICE — SHT AIR TRANSFR COMFRT GLIDE LAT 40X80IN

## (undated) DEVICE — ST ACC MICROPUNCTURE STFF/CANN PLAT/TP 4F 21G 40CM

## (undated) DEVICE — DRAPE SHEET ULTRAGARD: Brand: MEDLINE

## (undated) DEVICE — GLV SURG BIOGEL LTX PF 7 1/2

## (undated) DEVICE — SENSR CERBRL O2 PK/2

## (undated) DEVICE — GAUZE,SPONGE,4"X4",6PLY,STRL,LF: Brand: MEDLINE

## (undated) DEVICE — 3M™ TEGADERM™ IV TRANSPARENT FILM DRESSING WITH BORDER 100 BAGS/CARTON 4 CARTONS/CASE 1633: Brand: 3M™ TEGADERM™

## (undated) DEVICE — Device

## (undated) DEVICE — SCANLAN® VASCU-STATT® II SINGLE-USE BULLDOG CLAMP W/FIRMER CLAMPING PRESS - MIDI ANGLED 45° (YELLOW), CLAMPING PRESSURE 75-80 G (2/STERILE PKG): Brand: SCANLAN® VASCU-STATT® II SINGLE-USE BULLDOG CLAMP W/FIRMER CLAMPING PRESS

## (undated) DEVICE — SOL NACL 0.9PCT 1000ML

## (undated) DEVICE — TUBING, SUCTION, 1/4" X 12', STRAIGHT: Brand: MEDLINE

## (undated) DEVICE — BNDG,ELSTC,MATRIX,STRL,6"X5YD,LF,HOOK&LP: Brand: MEDLINE

## (undated) DEVICE — CVR PROB ULTRASND CIVFLEX GEN/PURP TELESCP/FOLD 5.5X96IN LF

## (undated) DEVICE — ELECTRD DEFIB M/FUNC PROPADZ STRL 2PK

## (undated) DEVICE — SYS VASOVIEW HEMOPRO ENDOSCOPIC HARVST VESL

## (undated) DEVICE — TBG INSUFF MALE L/L W 12MM CON: Brand: MEDLINE INDUSTRIES, INC.

## (undated) DEVICE — 500ML,PRESSURE INFUSER W/STOPCOCK: Brand: MEDLINE

## (undated) DEVICE — SUT PROLN 5/0 V5 36IN 8934H

## (undated) DEVICE — CORONARY ARTERY BYPASS GRAFT MARKERS, STAINLESS STEEL, DISTAL, WITHOUT HOLDER: Brand: ANASTOMARK CORONARY ARTERY BYPASS GRAFT MARKERS, STAINLESS STEEL, DISTAL

## (undated) DEVICE — PRESSURE MONITORING SET: Brand: TRUWAVE, VAMP PLUS

## (undated) DEVICE — ROTATING SURGICAL PUNCHES, 1 PER POUCH: Brand: A&E MEDICAL / ROTATING SURGICAL PUNCHES

## (undated) DEVICE — BIOPATCH™ ANTIMICROBIAL DRESSING WITH CHLORHEXIDINE GLUCONATE IS A HYDROPHILLIC POLYURETHANE ABSORPTIVE FOAM WITH CHLORHEXIDINE GLUCONATE (CHG) WHICH INHIBITS BACTERIAL GROWTH UNDER THE DRESSING. THE DRESSING IS INTENDED TO BE USED TO ABSORB EXUDATE, COVER A WOUND CAUSED BY VASCULAR AND NONVASCULAR PERCUTANEOUS MEDICAL DEVICES DURING SURGERY, AS WELL AS REDUCE LOCAL INFECTION AND COLONIZATION OF MICROORGANISMS.: Brand: BIOPATCH

## (undated) DEVICE — 3M™ TEGADERM™ I.V. ADVANCED SECUREMENT DRESSING, 1685, 3-1/2 IN X 4-1/2 IN (8.5 CM X 11.5 CM), 50/CT 4CT/CASE: Brand: 3M™ TEGADERM™

## (undated) DEVICE — SUT SILK 4/0 TIES 18IN A183H

## (undated) DEVICE — SUT SILK 2/0 SH CR8 18IN CR8 C012D

## (undated) DEVICE — DRN WND CH RND FUL/FLUT NO/TROC 3/8IN 28F

## (undated) DEVICE — PK ATS CUST W CARDIOTOMY RESEVOIR

## (undated) DEVICE — PK TRY HEART CATH 50

## (undated) DEVICE — SUT PROLN 4/0 V5 36IN 8935H

## (undated) DEVICE — 28 FR RIGHT ANGLE – SOFT PVC CATHETER: Brand: PVC THORACIC CATHETERS

## (undated) DEVICE — SUT SILK 2 SUTUPAK TIE 60IN SA8H 2STRAND

## (undated) DEVICE — GLIDESHEATH SLENDER ACCESS KIT: Brand: GLIDESHEATH SLENDER

## (undated) DEVICE — CATH ART RADL 20GA 1 3/4IN LF

## (undated) DEVICE — ELECTRD BLD EZ CLN STD 6.5IN

## (undated) DEVICE — CANN AORT ROOT DLP VNT/8IN 14G 7F

## (undated) DEVICE — BLOWER MISTER CLEARVIEW W/TBG

## (undated) DEVICE — ST IV BLD W/HANDPUMP YSITE 125IN

## (undated) DEVICE — CABL BIPOL W/ALLGTR CLIP/SM 12FT

## (undated) DEVICE — BLD SCLPL BEAVR MINI STR 2BVL 180D LF

## (undated) DEVICE — DGW .035 FC J3MM 260CM TEF: Brand: EMERALD

## (undated) DEVICE — ANTIBACTERIAL UNDYED BRAIDED (POLYGLACTIN 910), SYNTHETIC ABSORBABLE SUTURE: Brand: COATED VICRYL

## (undated) DEVICE — SUT PROLN 3/0 V7 D/A 36IN 8976H

## (undated) DEVICE — ELECTRD DEFIB M/FUNC PROPADZ RADIOL 2PK

## (undated) DEVICE — SUT PROLN 7/0 BV1 D/A 30IN 8703H

## (undated) DEVICE — SUT PROLN 5/0 RB2 D/A 30IN 8710H

## (undated) DEVICE — KT CATH CV ACC MAC 2L SFTY 9F 4 1/2IN

## (undated) DEVICE — DRSNG SLVR/ANTIBAC PRIMASEAL POST/OP ADHS 3.5X10IN

## (undated) DEVICE — CATH TDILUT SWANGANZ VIP 7.5F 110CM

## (undated) DEVICE — CANN ART EOPA 3D NV W/CONN 20F